# Patient Record
Sex: MALE | Race: BLACK OR AFRICAN AMERICAN | Employment: OTHER | URBAN - NONMETROPOLITAN AREA
[De-identification: names, ages, dates, MRNs, and addresses within clinical notes are randomized per-mention and may not be internally consistent; named-entity substitution may affect disease eponyms.]

---

## 2017-02-01 ENCOUNTER — HOSPITAL ENCOUNTER (EMERGENCY)
Age: 37
Discharge: HOME | End: 2017-02-01
Payer: MEDICAID

## 2017-02-01 ENCOUNTER — APPOINTMENT (OUTPATIENT)
Dept: GENERAL RADIOLOGY | Age: 37
End: 2017-02-01
Payer: MEDICAID

## 2017-02-01 VITALS
BODY MASS INDEX: 49.09 KG/M2 | DIASTOLIC BLOOD PRESSURE: 65 MMHG | RESPIRATION RATE: 18 BRPM | OXYGEN SATURATION: 97 % | TEMPERATURE: 98.2 F | WEIGHT: 260 LBS | HEIGHT: 61 IN | SYSTOLIC BLOOD PRESSURE: 104 MMHG | HEART RATE: 105 BPM

## 2017-02-01 DIAGNOSIS — M10.9 ACUTE GOUT OF LEFT WRIST, UNSPECIFIED CAUSE: Primary | ICD-10-CM

## 2017-02-01 DIAGNOSIS — M10.9 GOUT OF BIG TOE: ICD-10-CM

## 2017-02-01 PROCEDURE — 99283 EMERGENCY DEPT VISIT LOW MDM: CPT

## 2017-02-01 PROCEDURE — 73110 X-RAY EXAM OF WRIST: CPT

## 2017-02-01 PROCEDURE — 99282 EMERGENCY DEPT VISIT SF MDM: CPT | Performed by: NURSE PRACTITIONER

## 2017-02-01 RX ORDER — INDOMETHACIN 25 MG/1
CAPSULE ORAL
Qty: 60 CAPSULE | Refills: 0 | Status: SHIPPED | OUTPATIENT
Start: 2017-02-01 | End: 2020-01-06 | Stop reason: SDUPTHER

## 2017-02-23 DIAGNOSIS — E66.01 MORBID OBESITY WITH BMI OF 45.0-49.9, ADULT (HCC): ICD-10-CM

## 2017-02-23 DIAGNOSIS — Z11.4 SCREENING FOR HIV WITHOUT PRESENCE OF RISK FACTORS: ICD-10-CM

## 2017-02-23 LAB
ALBUMIN SERPL-MCNC: 3.5 G/DL (ref 3.5–5.2)
ALP BLD-CCNC: 117 U/L (ref 40–130)
ALT SERPL-CCNC: 25 U/L (ref 5–41)
ANION GAP SERPL CALCULATED.3IONS-SCNC: 14 MMOL/L (ref 7–19)
AST SERPL-CCNC: 18 U/L (ref 5–40)
BILIRUB SERPL-MCNC: <0.2 MG/DL (ref 0.2–1.2)
BUN BLDV-MCNC: 24 MG/DL (ref 6–20)
CALCIUM SERPL-MCNC: 9.2 MG/DL (ref 8.6–10)
CHLORIDE BLD-SCNC: 103 MMOL/L (ref 98–111)
CHOLESTEROL, TOTAL: 224 MG/DL (ref 160–199)
CO2: 22 MMOL/L (ref 22–29)
CREAT SERPL-MCNC: 1 MG/DL (ref 0.5–1.2)
GFR NON-AFRICAN AMERICAN: >60
GLOBULIN: 3.8 G/DL
GLUCOSE BLD-MCNC: 99 MG/DL (ref 74–109)
HCT VFR BLD CALC: 38.4 % (ref 42–52)
HDLC SERPL-MCNC: 59 MG/DL (ref 55–121)
HEMOGLOBIN: 13 G/DL (ref 14–18)
LDL CHOLESTEROL CALCULATED: 146 MG/DL
MCH RBC QN AUTO: 29.6 PG (ref 27–31)
MCHC RBC AUTO-ENTMCNC: 33.9 G/DL (ref 33–37)
MCV RBC AUTO: 87.5 FL (ref 80–94)
PDW BLD-RTO: 14.8 % (ref 11.5–14.5)
PLATELET # BLD: 369 K/UL (ref 130–400)
PMV BLD AUTO: 9.7 FL (ref 7.4–10.4)
POTASSIUM SERPL-SCNC: 4.2 MMOL/L (ref 3.5–5)
RAPID HIV 1&2: NORMAL
RBC # BLD: 4.39 M/UL (ref 4.7–6.1)
SODIUM BLD-SCNC: 139 MMOL/L (ref 136–145)
TOTAL PROTEIN: 7.3 G/DL (ref 6.6–8.7)
TRIGL SERPL-MCNC: 95 MG/DL (ref 150–199)
WBC # BLD: 4.3 K/UL (ref 4.8–10.8)

## 2017-03-08 ENCOUNTER — OFFICE VISIT (OUTPATIENT)
Dept: PRIMARY CARE CLINIC | Age: 37
End: 2017-03-08
Payer: MEDICAID

## 2017-03-08 VITALS
WEIGHT: 258 LBS | OXYGEN SATURATION: 99 % | HEIGHT: 61 IN | SYSTOLIC BLOOD PRESSURE: 110 MMHG | TEMPERATURE: 98.2 F | DIASTOLIC BLOOD PRESSURE: 68 MMHG | HEART RATE: 93 BPM | RESPIRATION RATE: 19 BRPM | BODY MASS INDEX: 48.71 KG/M2

## 2017-03-08 DIAGNOSIS — M10.09 IDIOPATHIC GOUT OF MULTIPLE SITES, UNSPECIFIED CHRONICITY: ICD-10-CM

## 2017-03-08 DIAGNOSIS — Z23 NEED FOR PROPHYLACTIC VACCINATION AGAINST DIPHTHERIA-TETANUS-PERTUSSIS (DTP): Primary | ICD-10-CM

## 2017-03-08 DIAGNOSIS — D50.8 OTHER IRON DEFICIENCY ANEMIA: ICD-10-CM

## 2017-03-08 DIAGNOSIS — E78.49 OTHER HYPERLIPIDEMIA: ICD-10-CM

## 2017-03-08 DIAGNOSIS — Z11.4 SCREENING FOR HIV WITHOUT PRESENCE OF RISK FACTORS: ICD-10-CM

## 2017-03-08 PROCEDURE — 90715 TDAP VACCINE 7 YRS/> IM: CPT | Performed by: NURSE PRACTITIONER

## 2017-03-08 PROCEDURE — 99213 OFFICE O/P EST LOW 20 MIN: CPT | Performed by: NURSE PRACTITIONER

## 2017-03-08 PROCEDURE — 90471 IMMUNIZATION ADMIN: CPT | Performed by: NURSE PRACTITIONER

## 2017-03-08 ASSESSMENT — ENCOUNTER SYMPTOMS
RESPIRATORY NEGATIVE: 1
COLOR CHANGE: 1

## 2017-06-05 DIAGNOSIS — L30.9 ECZEMA, UNSPECIFIED TYPE: ICD-10-CM

## 2017-06-05 RX ORDER — AMMONIUM LACTATE 12 G/100G
LOTION TOPICAL
Qty: 400 G | Refills: 11 | Status: SHIPPED | OUTPATIENT
Start: 2017-06-05 | End: 2018-09-14 | Stop reason: SDUPTHER

## 2017-09-08 ENCOUNTER — OFFICE VISIT (OUTPATIENT)
Dept: PRIMARY CARE CLINIC | Age: 37
End: 2017-09-08
Payer: MEDICAID

## 2017-09-08 VITALS
OXYGEN SATURATION: 96 % | DIASTOLIC BLOOD PRESSURE: 66 MMHG | HEART RATE: 88 BPM | TEMPERATURE: 97.8 F | RESPIRATION RATE: 16 BRPM | SYSTOLIC BLOOD PRESSURE: 105 MMHG | HEIGHT: 61 IN | WEIGHT: 258.4 LBS | BODY MASS INDEX: 48.79 KG/M2

## 2017-09-08 DIAGNOSIS — Q90.9 DS (DOWN'S SYNDROME): ICD-10-CM

## 2017-09-08 DIAGNOSIS — M10.09 IDIOPATHIC GOUT OF MULTIPLE SITES, UNSPECIFIED CHRONICITY: Primary | ICD-10-CM

## 2017-09-08 DIAGNOSIS — E66.01 MORBID OBESITY WITH BMI OF 45.0-49.9, ADULT (HCC): ICD-10-CM

## 2017-09-08 PROCEDURE — 99214 OFFICE O/P EST MOD 30 MIN: CPT | Performed by: NURSE PRACTITIONER

## 2017-09-08 ASSESSMENT — ENCOUNTER SYMPTOMS
SORE THROAT: 0
FACIAL SWELLING: 0
SHORTNESS OF BREATH: 0
VOICE CHANGE: 0
TROUBLE SWALLOWING: 0
WHEEZING: 0
APNEA: 0

## 2018-08-31 ENCOUNTER — TELEPHONE (OUTPATIENT)
Dept: PRIMARY CARE CLINIC | Age: 38
End: 2018-08-31

## 2018-08-31 NOTE — TELEPHONE ENCOUNTER
Pt father called and stated that pt was having a \"coughing fit\"  Stated that he would like an appointment. I advised pt that there was nothing available today but he could go to urgent care and be seen.  Father understood

## 2018-09-14 ENCOUNTER — OFFICE VISIT (OUTPATIENT)
Dept: PRIMARY CARE CLINIC | Age: 38
End: 2018-09-14
Payer: MEDICAID

## 2018-09-14 VITALS
TEMPERATURE: 97.2 F | WEIGHT: 258 LBS | SYSTOLIC BLOOD PRESSURE: 110 MMHG | BODY MASS INDEX: 50.65 KG/M2 | HEIGHT: 60 IN | OXYGEN SATURATION: 98 % | HEART RATE: 70 BPM | DIASTOLIC BLOOD PRESSURE: 70 MMHG

## 2018-09-14 DIAGNOSIS — Z00.00 WELL ADULT HEALTH CHECK: Primary | ICD-10-CM

## 2018-09-14 DIAGNOSIS — Q90.9 DOWN'S SYNDROME: ICD-10-CM

## 2018-09-14 DIAGNOSIS — L30.9 ECZEMA, UNSPECIFIED TYPE: ICD-10-CM

## 2018-09-14 DIAGNOSIS — M10.09 IDIOPATHIC GOUT OF MULTIPLE SITES, UNSPECIFIED CHRONICITY: ICD-10-CM

## 2018-09-14 PROCEDURE — G0444 DEPRESSION SCREEN ANNUAL: HCPCS | Performed by: NURSE PRACTITIONER

## 2018-09-14 PROCEDURE — 99395 PREV VISIT EST AGE 18-39: CPT | Performed by: NURSE PRACTITIONER

## 2018-09-14 RX ORDER — AMMONIUM LACTATE 12 G/100G
LOTION TOPICAL
Qty: 400 G | Refills: 11 | Status: SHIPPED | OUTPATIENT
Start: 2018-09-14 | End: 2020-01-06 | Stop reason: SDUPTHER

## 2018-09-14 ASSESSMENT — PATIENT HEALTH QUESTIONNAIRE - PHQ9
10. IF YOU CHECKED OFF ANY PROBLEMS, HOW DIFFICULT HAVE THESE PROBLEMS MADE IT FOR YOU TO DO YOUR WORK, TAKE CARE OF THINGS AT HOME, OR GET ALONG WITH OTHER PEOPLE: 0
1. LITTLE INTEREST OR PLEASURE IN DOING THINGS: 0
9. THOUGHTS THAT YOU WOULD BE BETTER OFF DEAD, OR OF HURTING YOURSELF: 0
SUM OF ALL RESPONSES TO PHQ QUESTIONS 1-9: 0
3. TROUBLE FALLING OR STAYING ASLEEP: 0
5. POOR APPETITE OR OVEREATING: 0
8. MOVING OR SPEAKING SO SLOWLY THAT OTHER PEOPLE COULD HAVE NOTICED. OR THE OPPOSITE, BEING SO FIGETY OR RESTLESS THAT YOU HAVE BEEN MOVING AROUND A LOT MORE THAN USUAL: 0
SUM OF ALL RESPONSES TO PHQ9 QUESTIONS 1 & 2: 0
4. FEELING TIRED OR HAVING LITTLE ENERGY: 0
7. TROUBLE CONCENTRATING ON THINGS, SUCH AS READING THE NEWSPAPER OR WATCHING TELEVISION: 0
2. FEELING DOWN, DEPRESSED OR HOPELESS: 0
6. FEELING BAD ABOUT YOURSELF - OR THAT YOU ARE A FAILURE OR HAVE LET YOURSELF OR YOUR FAMILY DOWN: 0
SUM OF ALL RESPONSES TO PHQ QUESTIONS 1-9: 0

## 2018-09-17 ASSESSMENT — ENCOUNTER SYMPTOMS
CONSTIPATION: 0
SHORTNESS OF BREATH: 0
CHOKING: 0
BACK PAIN: 0
DIARRHEA: 0
STRIDOR: 0
CHEST TIGHTNESS: 0
APNEA: 0
COUGH: 0
WHEEZING: 0
COLOR CHANGE: 0
ABDOMINAL DISTENTION: 0
ABDOMINAL PAIN: 0
VOMITING: 0

## 2018-09-17 NOTE — PROGRESS NOTES
about 1 year (around 9/14/2019). An electronic signature was used to authenticate this note.     --VANESA Justin on 9/17/2018 at 5:12 PM

## 2019-08-12 ENCOUNTER — HOSPITAL ENCOUNTER (EMERGENCY)
Facility: HOSPITAL | Age: 39
Discharge: HOME | End: 2019-08-13
Admitting: EMERGENCY MEDICINE

## 2019-08-12 ENCOUNTER — APPOINTMENT (OUTPATIENT)
Dept: GENERAL RADIOLOGY | Facility: HOSPITAL | Age: 39
End: 2019-08-12

## 2019-08-12 ENCOUNTER — APPOINTMENT (OUTPATIENT)
Dept: CT IMAGING | Facility: HOSPITAL | Age: 39
End: 2019-08-12

## 2019-08-12 DIAGNOSIS — M77.9 TENDONITIS: Primary | ICD-10-CM

## 2019-08-12 PROCEDURE — 73200 CT UPPER EXTREMITY W/O DYE: CPT

## 2019-08-12 PROCEDURE — 99283 EMERGENCY DEPT VISIT LOW MDM: CPT

## 2019-08-12 PROCEDURE — 73130 X-RAY EXAM OF HAND: CPT

## 2019-08-12 PROCEDURE — 73110 X-RAY EXAM OF WRIST: CPT

## 2019-08-13 VITALS
WEIGHT: 274 LBS | HEART RATE: 85 BPM | SYSTOLIC BLOOD PRESSURE: 105 MMHG | TEMPERATURE: 99 F | BODY MASS INDEX: 50.42 KG/M2 | RESPIRATION RATE: 15 BRPM | HEIGHT: 62 IN | DIASTOLIC BLOOD PRESSURE: 70 MMHG | OXYGEN SATURATION: 97 %

## 2019-08-13 RX ORDER — NAPROXEN 500 MG/1
500 TABLET ORAL 2 TIMES DAILY PRN
Qty: 10 TABLET | Refills: 0 | Status: SHIPPED | OUTPATIENT
Start: 2019-08-13 | End: 2019-08-18

## 2019-08-13 RX ORDER — METHYLPREDNISOLONE 4 MG/1
TABLET ORAL
Qty: 1 EACH | Refills: 0 | Status: SHIPPED | OUTPATIENT
Start: 2019-08-13 | End: 2021-07-09

## 2019-08-13 NOTE — ED PROVIDER NOTES
Subjective   39-year-old -American male with past medical history significant for Down syndrome presents to the emergency department with right hand pain.  He is brought in by his father.  The father met the child this evening after he was picked up from adult .  He is complaining of right dorsal wrist and hand pain.  The patient is unable to provide a history and reliable nature for any trauma.  The father states that he normally does not complain in the stairs with intermittent pain.        History provided by:  Patient  History limited by:  Patient nonverbal (History of Down syndrome.)   used: No        Review of Systems   Unable to perform ROS: Patient nonverbal       No past medical history on file.    No Known Allergies    No past surgical history on file.    No family history on file.    Social History     Socioeconomic History   • Marital status: Single     Spouse name: Not on file   • Number of children: Not on file   • Years of education: Not on file   • Highest education level: Not on file       Lab Results (last 24 hours)     ** No results found for the last 24 hours. **          Objective   Physical Exam   Constitutional: He appears well-developed and well-nourished. No distress.   HENT:   Head: Normocephalic and atraumatic.   Right Ear: External ear normal.   Left Ear: External ear normal.   Eyes: Conjunctivae and EOM are normal. Pupils are equal, round, and reactive to light. Right eye exhibits no discharge. Left eye exhibits no discharge. No scleral icterus.   Neck: Normal range of motion. Neck supple. No tracheal deviation present. No thyromegaly present.   Cardiovascular: Normal rate, regular rhythm, normal heart sounds and intact distal pulses. Exam reveals no friction rub.   No murmur heard.  Pulmonary/Chest: Effort normal and breath sounds normal. No respiratory distress. He has no wheezes.   Abdominal: Soft. Bowel sounds are normal. He exhibits no distension.  "There is no tenderness. There is no guarding.   Musculoskeletal:        Right wrist: He exhibits decreased range of motion, tenderness, bony tenderness, swelling and effusion. He exhibits no crepitus, no deformity and no laceration.        Arms:  Tenderness to palpation of the right dorsal area of the wrist.  No obvious deformities or overlying edema or erythema or warmth.  Neurovascular is currently intact distally in the radial, median, ulnar nerve distribution.  2+ radial pulses less than 2-second capillary refill.   Skin: Skin is warm and dry. Capillary refill takes less than 2 seconds. He is not diaphoretic.   Nursing note and vitals reviewed.      Procedures         XR Wrist 3+ View Right    (Results Pending)   XR Hand 3+ View Right    (Results Pending)   CT Upper Extremity Right Without Contrast    (Results Pending)       /70   Pulse 85   Temp 99 °F (37.2 °C)   Resp 15   Ht 157.5 cm (62\")   Wt 124 kg (274 lb)   SpO2 97%   BMI 50.12 kg/m²     ED Course         Medications - No data to display         MDM  Number of Diagnoses or Management Options  Tendonitis:   Diagnosis management comments: This is a 39-year-old -American male with Down syndrome with right wrist tendinitis.  Unreliable historian secondary to his Down syndrome.  Discussed case with my attending physician Dr. Sorto who recommended CT scan after x-rays were equivocal.  CT scan does show inflammation with wrist effusion.  Will have patient follow-up with the orthopedic [facility] and will apply a wrist brace.  Steroids and NSAIDs given.       Amount and/or Complexity of Data Reviewed  Tests in the radiology section of CPT®: reviewed and ordered    Risk of Complications, Morbidity, and/or Mortality  Presenting problems: low  Diagnostic procedures: low  Management options: low    Patient Progress  Patient progress: stable      Final diagnoses:   Tendonitis          Ezio Olivas PA-C  08/13/19 0215    "

## 2019-08-13 NOTE — DISCHARGE INSTRUCTIONS
The CT scan of your wrist shows nonspecific fat stranding around the tendons which correlates with possible Tendinitis and wrist joint effusion.  Please follow-up with the orthopedic [facility] for reevaluation of the symptoms.  Wear the brace as directed.  Take the steroid medication in the naproxen as directed.  Apply ice 4 times per day for 20 minutes at a time for the next 2 days.  Return to the ER should you develop any new, worsening or other concerning symptoms such as those listed below.      Contact a health care provider if:  Your symptoms are not improving or are getting worse.  You have a fever and more of any of the following symptoms:  Pain.  Redness.  Warmth.  Swelling.

## 2020-01-06 ENCOUNTER — OFFICE VISIT (OUTPATIENT)
Dept: PRIMARY CARE CLINIC | Age: 40
End: 2020-01-06
Payer: MEDICAID

## 2020-01-06 VITALS
OXYGEN SATURATION: 98 % | WEIGHT: 256 LBS | TEMPERATURE: 98 F | DIASTOLIC BLOOD PRESSURE: 68 MMHG | SYSTOLIC BLOOD PRESSURE: 122 MMHG | BODY MASS INDEX: 48.33 KG/M2 | HEART RATE: 88 BPM | HEIGHT: 61 IN

## 2020-01-06 DIAGNOSIS — M10.9 GOUT OF BIG TOE: ICD-10-CM

## 2020-01-06 DIAGNOSIS — Q90.9 DOWN'S SYNDROME: Chronic | ICD-10-CM

## 2020-01-06 LAB
ALBUMIN SERPL-MCNC: 3.6 G/DL (ref 3.5–5.2)
ALP BLD-CCNC: 97 U/L (ref 40–130)
ALT SERPL-CCNC: 16 U/L (ref 5–41)
ANION GAP SERPL CALCULATED.3IONS-SCNC: 16 MMOL/L (ref 7–19)
AST SERPL-CCNC: 18 U/L (ref 5–40)
BASOPHILS ABSOLUTE: 0.1 K/UL (ref 0–0.2)
BASOPHILS RELATIVE PERCENT: 1.2 % (ref 0–1)
BILIRUB SERPL-MCNC: 0.3 MG/DL (ref 0.2–1.2)
BUN BLDV-MCNC: 16 MG/DL (ref 6–20)
CALCIUM SERPL-MCNC: 9.1 MG/DL (ref 8.6–10)
CHLORIDE BLD-SCNC: 106 MMOL/L (ref 98–111)
CHOLESTEROL, TOTAL: 226 MG/DL (ref 160–199)
CO2: 24 MMOL/L (ref 22–29)
CREAT SERPL-MCNC: 1 MG/DL (ref 0.5–1.2)
EOSINOPHILS ABSOLUTE: 0 K/UL (ref 0–0.6)
EOSINOPHILS RELATIVE PERCENT: 0.5 % (ref 0–5)
GFR NON-AFRICAN AMERICAN: >60
GLUCOSE BLD-MCNC: 100 MG/DL (ref 74–109)
HBA1C MFR BLD: 6 % (ref 4–6)
HCT VFR BLD CALC: 41.7 % (ref 42–52)
HDLC SERPL-MCNC: 60 MG/DL (ref 55–121)
HEMOGLOBIN: 13.5 G/DL (ref 14–18)
IMMATURE GRANULOCYTES #: 0 K/UL
LDL CHOLESTEROL CALCULATED: 148 MG/DL
LYMPHOCYTES ABSOLUTE: 1.6 K/UL (ref 1.1–4.5)
LYMPHOCYTES RELATIVE PERCENT: 38.9 % (ref 20–40)
MCH RBC QN AUTO: 29.2 PG (ref 27–31)
MCHC RBC AUTO-ENTMCNC: 32.4 G/DL (ref 33–37)
MCV RBC AUTO: 90.1 FL (ref 80–94)
MONOCYTES ABSOLUTE: 0.6 K/UL (ref 0–0.9)
MONOCYTES RELATIVE PERCENT: 13.4 % (ref 0–10)
NEUTROPHILS ABSOLUTE: 1.9 K/UL (ref 1.5–7.5)
NEUTROPHILS RELATIVE PERCENT: 45.5 % (ref 50–65)
PDW BLD-RTO: 15.1 % (ref 11.5–14.5)
PLATELET # BLD: 331 K/UL (ref 130–400)
PMV BLD AUTO: 9.5 FL (ref 9.4–12.4)
POTASSIUM SERPL-SCNC: 3.6 MMOL/L (ref 3.5–5)
RBC # BLD: 4.63 M/UL (ref 4.7–6.1)
SODIUM BLD-SCNC: 146 MMOL/L (ref 136–145)
TOTAL PROTEIN: 7.5 G/DL (ref 6.6–8.7)
TRIGL SERPL-MCNC: 90 MG/DL (ref 0–149)
TSH SERPL DL<=0.05 MIU/L-ACNC: 2.26 UIU/ML (ref 0.27–4.2)
URIC ACID, SERUM: 10 MG/DL (ref 3.4–7)
WBC # BLD: 4.1 K/UL (ref 4.8–10.8)

## 2020-01-06 PROCEDURE — 99395 PREV VISIT EST AGE 18-39: CPT | Performed by: NURSE PRACTITIONER

## 2020-01-06 RX ORDER — INDOMETHACIN 25 MG/1
CAPSULE ORAL
Qty: 60 CAPSULE | Refills: 0 | Status: SHIPPED | OUTPATIENT
Start: 2020-01-06 | End: 2020-03-12

## 2020-01-06 RX ORDER — ALLOPURINOL 300 MG/1
300 TABLET ORAL DAILY
Qty: 30 TABLET | Refills: 5 | Status: SHIPPED | OUTPATIENT
Start: 2020-01-06 | End: 2021-01-12 | Stop reason: SDUPTHER

## 2020-01-06 RX ORDER — AMMONIUM LACTATE 12 G/100G
LOTION TOPICAL
Qty: 400 G | Refills: 11 | Status: SHIPPED | OUTPATIENT
Start: 2020-01-06 | End: 2021-01-27

## 2020-01-06 ASSESSMENT — ENCOUNTER SYMPTOMS
COLOR CHANGE: 0
VOICE CHANGE: 0
RHINORRHEA: 0
BACK PAIN: 0
PHOTOPHOBIA: 0
VOMITING: 0
SHORTNESS OF BREATH: 0
COUGH: 0
NAUSEA: 0

## 2020-01-06 NOTE — PROGRESS NOTES
[facility]  [address] [address] [place] [address]  Dept: [phone]  Dept Fax: [phone]  Loc: [phone]    Toña Blake is a 46 y.o. male who presents today for his medical conditions/complaints as noted below. Toña Blake is c/o of Hyperlipidemia (Follow up) and Mental Health Problem (Rita Klein Syndrome, follow up)      HPI:     HPI   Chief Complaint   Patient presents with    Hyperlipidemia     Follow up   [address], follow up     Patient presents today for follow-up hyperlipidemia, gout, eczema. Patient has history of Down's Syndrome. Currently medical conditions are well-controlled on medications. He is needing refills today. Last labs were almost 3 years ago. He will need routine blood work. His father states they are working weight loss as he enjoys eating quite a bit.      Past Medical History:   Diagnosis Date    Back stiffness     Down's syndrome     Gout     Stiffness in joint     bilateral legs      Past Surgical History:   Procedure Laterality Date    CARDIAC SURGERY      septal repair       Vitals 1/6/2020 9/14/2018 9/8/2017 3/8/2017 2/1/2017 5/0/2143   SYSTOLIC 239 015 364 328 116 472   DIASTOLIC 68 70 66 68 65 65   Site - - - Left Arm - -   Position - - - Sitting - -   Cuff Size - - - Medium Adult - -   Pulse 88 70 88 93 105 105   Temp 98 97.2 97.8 98.2 - 98.2   Resp - - 16 19 18 18   SpO2 98 98 96 99 97 97   Weight 256 lb 258 lb 258 lb 6.4 oz 258 lb - 260 lb   Height 5' 1\" 5' 0\" 5' 1\" 5' 1\" - 5' 1\"   BMI (wt*703/ht~2) 48.37 kg/m2 50.38 kg/m2 48.82 kg/m2 48.74 kg/m2 - 49.12 kg/m2       Family History   Problem Relation Age of Onset    Other Father         gout    High Blood Pressure Mother        Social History     Tobacco Use    Smoking status: Never Smoker    Smokeless tobacco: Never Used   Substance Use Topics    Alcohol use: No      Current Outpatient Medications   Medication Sig Dispense Refill    indomethacin (INDOCIN) 25 MG capsule 1-2 CAPSULES BY MOUTH 3 TIMES A DAY AS NEEDED FOR ACUTE GOUT INFLAMMATION AND PAIN. TAKE WITH FOOD. 60 capsule 0    allopurinol (ZYLOPRIM) 300 MG tablet Take 1 tablet by mouth daily 30 tablet 5    ammonium lactate (LAC-HYDRIN) 12 % lotion Apply topically daily. 400 g 11     No current facility-administered medications for this visit. No Known Allergies    Health Maintenance   Topic Date Due    Varicella Vaccine (1 of 2 - 2-dose childhood series) 07/02/1982    Flu vaccine (1) 09/01/2019    DTaP/Tdap/Td vaccine (2 - Td) 03/08/2027    HIV screen  Completed    Pneumococcal 0-64 years Vaccine  Aged Out       Subjective:      Review of Systems   Constitutional: Negative for chills and fever. HENT: Negative for ear pain, hearing loss, rhinorrhea and voice change. Eyes: Negative for photophobia and visual disturbance. Respiratory: Negative for cough and shortness of breath. Cardiovascular: Negative for chest pain and palpitations. Gastrointestinal: Negative for nausea and vomiting. Endocrine: Negative. Negative for cold intolerance and heat intolerance. Genitourinary: Negative for difficulty urinating and flank pain. Musculoskeletal: Negative for back pain and neck pain. Skin: Negative for color change and rash. Allergic/Immunologic: Negative for environmental allergies and food allergies. Neurological: Negative for dizziness, speech difficulty and headaches. Hematological: Does not bruise/bleed easily. Psychiatric/Behavioral: Negative for sleep disturbance and suicidal ideas. All other systems reviewed and are negative. Objective:     Physical Exam  Vitals signs and nursing note reviewed. Constitutional:       Appearance: He is well-developed. Comments: Down's; slanted eyes, short stature. HENT:      Head: Atraumatic.       Right Ear: External ear normal.      Left Ear: External ear normal.      Nose: Nose normal. Eyes:      Conjunctiva/sclera: Conjunctivae normal.      Pupils: Pupils are equal, round, and reactive to light. Neck:      Musculoskeletal: Normal range of motion and neck supple. Cardiovascular:      Rate and Rhythm: Normal rate and regular rhythm. Heart sounds: Normal heart sounds, S1 normal and S2 normal.   Pulmonary:      Effort: Pulmonary effort is normal.      Breath sounds: Normal breath sounds. Abdominal:      General: Bowel sounds are normal.      Palpations: Abdomen is soft. Musculoskeletal: Normal range of motion. Skin:     General: Skin is warm and dry. Neurological:      Mental Status: He is alert and oriented to person, place, and time. Psychiatric:         Mood and Affect: Affect is flat. Behavior: Behavior normal.       /68   Pulse 88   Temp 98 °F (36.7 °C)   Ht 5' 1\" (1.549 m)   Wt 256 lb (116.1 kg)   SpO2 98%   BMI 48.37 kg/m²     Assessment:       Diagnosis Orders   1. Encounter for physical examination     2. Gout of big toe  indomethacin (INDOCIN) 25 MG capsule    allopurinol (ZYLOPRIM) 300 MG tablet   3. Eczema, unspecified type  ammonium lactate (LAC-HYDRIN) 12 % lotion   4. Down's syndrome         Plan:       Patient will get fasting labs today; we will see him for physical in 1 year or sooner if needed. Patient was asked about his current diet and exercise habits, and personalized advice was provided regarding recommended lifestyle changes. Patient's comorbid health conditions associated with elevated BMI were discussed, including dyslipidemia and mood disorder, as well as the likely benefits of weight loss. Based upon patient's motivation to change his behavior, the following plan was agreed upon to work toward a weight loss goal : low carbohydrate diet and exercise for at least 30 minutes 4-5 days per week. Educational materials for  weight loss were provided. Patient will follow-up in 1 year(s) with PCP.   Provider spent 15 minutes counseling patient. Patient given educational materials -see patient instructions. Discussed use, benefit, and side effects of prescribed medications. All patient questions answered. Pt voiced understanding. Reviewed health maintenance. Instructed to continue currentmedications, diet and exercise. Patient agreed with treatment plan. Follow up as directed. MEDICATIONS:  Orders Placed This Encounter   Medications    indomethacin (INDOCIN) 25 MG capsule     Si-2 CAPSULES BY MOUTH 3 TIMES A DAY AS NEEDED FOR ACUTE GOUT INFLAMMATION AND PAIN. TAKE WITH FOOD. Dispense:  60 capsule     Refill:  0    allopurinol (ZYLOPRIM) 300 MG tablet     Sig: Take 1 tablet by mouth daily     Dispense:  30 tablet     Refill:  5    ammonium lactate (LAC-HYDRIN) 12 % lotion     Sig: Apply topically daily. Dispense:  400 g     Refill:  11         ORDERS:  No orders of the defined types were placed in this encounter. Follow-up:  Return in about 1 year (around 2021) for follow-up. PATIENT INSTRUCTIONS:  There are no Patient Instructions on file for this visit. Electronically signed by VANESA Louis on 2020 at 10:06 AM    EMR Dragon/transcription disclaimer:  Much of thisencounter note is electronic transcription/translation of spoken language to printed texts. The electronic translation of spoken language may be erroneous, or at times, nonsensical words or phrases may be inadvertentlytranscribed.   Although I have reviewed the note for such errors, some may still exist.

## 2021-01-12 ENCOUNTER — OFFICE VISIT (OUTPATIENT)
Dept: PRIMARY CARE CLINIC | Age: 41
End: 2021-01-12
Payer: MEDICAID

## 2021-01-12 VITALS
BODY MASS INDEX: 47.31 KG/M2 | SYSTOLIC BLOOD PRESSURE: 130 MMHG | OXYGEN SATURATION: 100 % | TEMPERATURE: 97.3 F | WEIGHT: 267 LBS | HEART RATE: 111 BPM | RESPIRATION RATE: 20 BRPM | HEIGHT: 63 IN | DIASTOLIC BLOOD PRESSURE: 70 MMHG

## 2021-01-12 DIAGNOSIS — M10.9 GOUT OF BIG TOE: ICD-10-CM

## 2021-01-12 DIAGNOSIS — R53.83 FATIGUE, UNSPECIFIED TYPE: ICD-10-CM

## 2021-01-12 DIAGNOSIS — E78.2 MIXED HYPERLIPIDEMIA: ICD-10-CM

## 2021-01-12 DIAGNOSIS — Z00.00 ANNUAL PHYSICAL EXAM: ICD-10-CM

## 2021-01-12 DIAGNOSIS — Z00.00 ANNUAL PHYSICAL EXAM: Primary | ICD-10-CM

## 2021-01-12 LAB
ALBUMIN SERPL-MCNC: 3.3 G/DL (ref 3.5–5.2)
ALP BLD-CCNC: 102 U/L (ref 40–130)
ALT SERPL-CCNC: 13 U/L (ref 5–41)
ANION GAP SERPL CALCULATED.3IONS-SCNC: 8 MMOL/L (ref 7–19)
ANISOCYTOSIS: ABNORMAL
AST SERPL-CCNC: 14 U/L (ref 5–40)
ATYPICAL LYMPHOCYTE RELATIVE PERCENT: 2 % (ref 0–8)
BASOPHILS ABSOLUTE: 0 K/UL (ref 0–0.2)
BASOPHILS RELATIVE PERCENT: 1 % (ref 0–1)
BILIRUB SERPL-MCNC: <0.2 MG/DL (ref 0.2–1.2)
BUN BLDV-MCNC: 19 MG/DL (ref 6–20)
CALCIUM SERPL-MCNC: 9.1 MG/DL (ref 8.6–10)
CHLORIDE BLD-SCNC: 107 MMOL/L (ref 98–111)
CHOLESTEROL, TOTAL: 205 MG/DL (ref 160–199)
CO2: 26 MMOL/L (ref 22–29)
CREAT SERPL-MCNC: 0.9 MG/DL (ref 0.5–1.2)
EOSINOPHILS ABSOLUTE: 0 K/UL (ref 0–0.6)
EOSINOPHILS RELATIVE PERCENT: 0 % (ref 0–5)
GFR AFRICAN AMERICAN: >59
GFR NON-AFRICAN AMERICAN: >60
GLUCOSE BLD-MCNC: 105 MG/DL (ref 74–109)
HBA1C MFR BLD: 6.3 % (ref 4–6)
HCT VFR BLD CALC: 38.5 % (ref 42–52)
HDLC SERPL-MCNC: 58 MG/DL (ref 55–121)
HEMOGLOBIN: 12.6 G/DL (ref 14–18)
HYPOCHROMIA: ABNORMAL
IMMATURE GRANULOCYTES #: 0 K/UL
LDL CHOLESTEROL CALCULATED: 132 MG/DL
LYMPHOCYTES ABSOLUTE: 1.3 K/UL (ref 1.1–4.5)
LYMPHOCYTES RELATIVE PERCENT: 38 % (ref 20–40)
MCH RBC QN AUTO: 28.9 PG (ref 27–31)
MCHC RBC AUTO-ENTMCNC: 32.7 G/DL (ref 33–37)
MCV RBC AUTO: 88.3 FL (ref 80–94)
METAMYELOCYTES RELATIVE PERCENT: 1 %
MONOCYTES ABSOLUTE: 0.5 K/UL (ref 0–0.9)
MONOCYTES RELATIVE PERCENT: 14 % (ref 0–10)
NEUTROPHILS ABSOLUTE: 1.5 K/UL (ref 1.5–7.5)
NEUTROPHILS RELATIVE PERCENT: 44 % (ref 50–65)
PDW BLD-RTO: 15.2 % (ref 11.5–14.5)
PLATELET # BLD: 368 K/UL (ref 130–400)
PLATELET SLIDE REVIEW: ADEQUATE
PMV BLD AUTO: 10 FL (ref 9.4–12.4)
POLYCHROMASIA: ABNORMAL
POTASSIUM SERPL-SCNC: 3.8 MMOL/L (ref 3.5–5)
RBC # BLD: 4.36 M/UL (ref 4.7–6.1)
SODIUM BLD-SCNC: 141 MMOL/L (ref 136–145)
TOTAL PROTEIN: 7.2 G/DL (ref 6.6–8.7)
TRIGL SERPL-MCNC: 77 MG/DL (ref 0–149)
TSH SERPL DL<=0.05 MIU/L-ACNC: 3.71 UIU/ML (ref 0.27–4.2)
URIC ACID, SERUM: 7.4 MG/DL (ref 3.4–7)
VITAMIN D 25-HYDROXY: 8.2 NG/ML
WBC # BLD: 3.3 K/UL (ref 4.8–10.8)

## 2021-01-12 PROCEDURE — 99214 OFFICE O/P EST MOD 30 MIN: CPT | Performed by: NURSE PRACTITIONER

## 2021-01-12 RX ORDER — INDOMETHACIN 25 MG/1
CAPSULE ORAL
Qty: 60 CAPSULE | Refills: 1 | Status: SHIPPED | OUTPATIENT
Start: 2021-01-12 | End: 2021-06-28

## 2021-01-12 RX ORDER — METHYLPREDNISOLONE 4 MG/1
TABLET ORAL
Qty: 1 KIT | Refills: 0 | Status: SHIPPED | OUTPATIENT
Start: 2021-01-12 | End: 2021-01-18

## 2021-01-12 RX ORDER — ALLOPURINOL 300 MG/1
300 TABLET ORAL DAILY
Qty: 30 TABLET | Refills: 5 | Status: SHIPPED | OUTPATIENT
Start: 2021-01-12 | End: 2021-07-01 | Stop reason: SDUPTHER

## 2021-01-12 SDOH — ECONOMIC STABILITY: FOOD INSECURITY: WITHIN THE PAST 12 MONTHS, YOU WORRIED THAT YOUR FOOD WOULD RUN OUT BEFORE YOU GOT MONEY TO BUY MORE.: NEVER TRUE

## 2021-01-12 SDOH — ECONOMIC STABILITY: INCOME INSECURITY: HOW HARD IS IT FOR YOU TO PAY FOR THE VERY BASICS LIKE FOOD, HOUSING, MEDICAL CARE, AND HEATING?: NOT HARD AT ALL

## 2021-01-12 SDOH — ECONOMIC STABILITY: TRANSPORTATION INSECURITY
IN THE PAST 12 MONTHS, HAS LACK OF TRANSPORTATION KEPT YOU FROM MEETINGS, WORK, OR FROM GETTING THINGS NEEDED FOR DAILY LIVING?: NO

## 2021-01-12 ASSESSMENT — ENCOUNTER SYMPTOMS
RHINORRHEA: 0
COUGH: 0
BACK PAIN: 0
VOICE CHANGE: 0
VOMITING: 0
COLOR CHANGE: 0
SHORTNESS OF BREATH: 0
NAUSEA: 0
PHOTOPHOBIA: 0

## 2021-01-12 ASSESSMENT — PATIENT HEALTH QUESTIONNAIRE - PHQ9
SUM OF ALL RESPONSES TO PHQ QUESTIONS 1-9: 1

## 2021-01-12 NOTE — PROGRESS NOTES
[address] [facility]  [address] [address]  Dept: [phone]  Dept Fax: [phone]  Loc: [phone]    Allyson Cabral is a 45 y.o. male who presents today for his medical conditions/complaints as noted below. Allyson Cabral is c/o of Annual Exam (pt been real sleepy more, less active) and Joint Pain (been bothering more in wrist, sitting on them more)        HPI:     HPI   Chief Complaint   Patient presents with    Annual Exam     pt been real sleepy more, less active    Joint Pain     been bothering more in wrist, sitting on them more     Patient presents today for annual physical exam. He has history of gout and down's syndrome. Lately he has been complaining of wrist pain and also has been more fatigued. He is having right wrist pain; often sits on his hands. His father reports he has been complaining for about 1 week of wrist discomfort. No known injury; hx gout in hands. Last labs were 2020; he has been fasting today and can get them. Lab Results   Component Value Date     (H) 01/06/2020    K 3.6 01/06/2020     01/06/2020    CO2 24 01/06/2020    BUN 16 01/06/2020    CREATININE 1.0 01/06/2020    GLUCOSE 100 01/06/2020    CALCIUM 9.1 01/06/2020    PROT 7.5 01/06/2020    LABALBU 3.6 01/06/2020    BILITOT 0.3 01/06/2020    ALKPHOS 97 01/06/2020    AST 18 01/06/2020    ALT 16 01/06/2020    LABGLOM >60 01/06/2020    GLOB 3.8 02/23/2017       Lab Results   Component Value Date    CHOL 226 (H) 01/06/2020    CHOL 224 (H) 02/23/2017     Lab Results   Component Value Date    TRIG 90 01/06/2020    TRIG 95 (L) 02/23/2017     Lab Results   Component Value Date    HDL 60 01/06/2020    HDL 59 02/23/2017     Lab Results   Component Value Date    LDLCALC 148 01/06/2020    LDLCALC 146 02/23/2017     No results found for: LABVLDL, VLDL  No results found for: CHOLHDLRATIO.     Lab Results   Component Value Date    LABA1C 6.0 01/06/2020 No results found for: EAG  Lab Results   Component Value Date    TSH 2.260 01/06/2020     Lab Results   Component Value Date    LABURIC 10.0 (H) 01/06/2020    URICACID 9.0 (H) 06/12/2012     Lab Results   Component Value Date    WBC 4.1 (L) 01/06/2020    HGB 13.5 (L) 01/06/2020    HCT 41.7 (L) 01/06/2020    MCV 90.1 01/06/2020     01/06/2020           Past Medical History:   Diagnosis Date    Back stiffness     Down's syndrome     Osteoarthritis     Stiffness in joint     bilateral legs      Past Surgical History:   Procedure Laterality Date    CARDIAC SURGERY      septal repair       Vitals 1/12/2021 1/6/2020 9/14/2018 9/8/2017 3/8/2017 1/8/3052   SYSTOLIC 700 616 935 103 496 789   DIASTOLIC 70 68 70 66 68 65   Site - - - - Left Arm -   Position - - - - Sitting -   Cuff Size - - - - Medium Adult -   Pulse 111 88 70 88 93 105   Temp 97.3 98 97.2 97.8 98.2 -   Resp 20 - - 16 19 18   SpO2 100 98 98 96 99 97   Weight 267 lb 256 lb 258 lb 258 lb 6.4 oz 258 lb -   Height 5' 3\" 5' 1\" 5' 0\" 5' 1\" 5' 1\" -   Body mass index 47.29 kg/m2 48.37 kg/m2 50.38 kg/m2 48.82 kg/m2 48.74 kg/m2 -   Some recent data might be hidden       Family History   Problem Relation Age of Onset    Other Father         gout    High Blood Pressure Mother        Social History     Tobacco Use    Smoking status: Never Smoker    Smokeless tobacco: Never Used   Substance Use Topics    Alcohol use: No      Current Outpatient Medications   Medication Sig Dispense Refill    allopurinol (ZYLOPRIM) 300 MG tablet Take 1 tablet by mouth daily 30 tablet 5    indomethacin (INDOCIN) 25 MG capsule Take 1-2 CAPSULES BY MOUTH 3 TIMES A DAY AS NEEDED FOR ACUTE GOUT INFLAMMATION AND PAIN. TAKE WITH FOOD. 60 capsule 1    ammonium lactate (LAC-HYDRIN) 12 % lotion Apply topically daily. (Patient not taking: Reported on 1/12/2021) 400 g 11     No current facility-administered medications for this visit.       No Known Allergies    Health Maintenance Heart sounds: Normal heart sounds, S1 normal and S2 normal.   Pulmonary:      Effort: Pulmonary effort is normal.      Breath sounds: Normal breath sounds. Abdominal:      General: Bowel sounds are normal.      Palpations: Abdomen is soft. Musculoskeletal: Normal range of motion. Skin:     General: Skin is warm and dry. Neurological:      Mental Status: He is alert and oriented to person, place, and time. Psychiatric:         Behavior: Behavior normal.       /70   Pulse 111   Temp 97.3 °F (36.3 °C) (Temporal)   Resp 20   Ht 5' 3\" (1.6 m)   Wt 267 lb (121.1 kg)   SpO2 100%   BMI 47.30 kg/m²     Assessment:       Diagnosis Orders   1. Annual physical exam  CBC Auto Differential    Comprehensive Metabolic Panel    Lipid Panel    Hemoglobin A1C    TSH without Reflex    Vitamin D 25 Hydroxy    Uric Acid   2. Gout of big toe  allopurinol (ZYLOPRIM) 300 MG tablet    indomethacin (INDOCIN) 25 MG capsule    CBC Auto Differential    Comprehensive Metabolic Panel    Lipid Panel    Hemoglobin A1C    TSH without Reflex    Vitamin D 25 Hydroxy    Uric Acid   3. Fatigue, unspecified type  CBC Auto Differential    Comprehensive Metabolic Panel    Lipid Panel    Hemoglobin A1C    TSH without Reflex    Vitamin D 25 Hydroxy    Uric Acid   4. Mixed hyperlipidemia  CBC Auto Differential    Comprehensive Metabolic Panel    Lipid Panel    Hemoglobin A1C    TSH without Reflex    Vitamin D 25 Hydroxy    Uric Acid         Plan:     More than 50% of the time was spent counseling and coordinating care for a total time of 30 min face to face. Will add medrol dose pack for wrist pain; if not improving will need to follow-up for xrays. Encouraged exercise and diet. Will check vit d level.

## 2021-06-28 DIAGNOSIS — M10.9 GOUT OF BIG TOE: ICD-10-CM

## 2021-06-28 RX ORDER — INDOMETHACIN 25 MG/1
CAPSULE ORAL
Qty: 60 CAPSULE | Refills: 1 | Status: SHIPPED | OUTPATIENT
Start: 2021-06-28

## 2021-07-01 ENCOUNTER — OFFICE VISIT (OUTPATIENT)
Dept: PRIMARY CARE CLINIC | Age: 41
End: 2021-07-01
Payer: MEDICAID

## 2021-07-01 VITALS
RESPIRATION RATE: 18 BRPM | HEIGHT: 65 IN | SYSTOLIC BLOOD PRESSURE: 124 MMHG | HEART RATE: 82 BPM | BODY MASS INDEX: 44.15 KG/M2 | WEIGHT: 265 LBS | DIASTOLIC BLOOD PRESSURE: 82 MMHG | OXYGEN SATURATION: 98 % | TEMPERATURE: 97 F

## 2021-07-01 DIAGNOSIS — Z71.85 VACCINE COUNSELING: ICD-10-CM

## 2021-07-01 DIAGNOSIS — M10.9 GOUT OF BIG TOE: ICD-10-CM

## 2021-07-01 DIAGNOSIS — Q90.9 DOWN'S SYNDROME: Primary | Chronic | ICD-10-CM

## 2021-07-01 PROCEDURE — 99214 OFFICE O/P EST MOD 30 MIN: CPT | Performed by: NURSE PRACTITIONER

## 2021-07-01 RX ORDER — ALLOPURINOL 300 MG/1
450 TABLET ORAL DAILY
Qty: 45 TABLET | Refills: 5 | Status: SHIPPED | OUTPATIENT
Start: 2021-07-01 | End: 2021-11-22

## 2021-07-01 ASSESSMENT — ENCOUNTER SYMPTOMS
BACK PAIN: 0
NAUSEA: 0
COLOR CHANGE: 0
RHINORRHEA: 0
VOICE CHANGE: 0
COUGH: 0
SHORTNESS OF BREATH: 0
PHOTOPHOBIA: 0
VOMITING: 0

## 2021-07-01 NOTE — PROGRESS NOTES
[facility]  [id number] [place] [address]  Dept: [phone]  Dept Fax: [phone]  Loc: [phone]    Lige Cogan is a 45 y.o. male who presents today for his medical conditions/complaints as noted below. Lige Cogan is c/o of Fatigue (Pt caregiver reports improvement with fatigue but pt is still having gout flare ups) and Other (has some questions about COVID vaccine - they would like to see if he should be vaccinated. )        HPI:     HPI   Chief Complaint   Patient presents with    Fatigue     Pt caregiver reports improvement with fatigue but pt is still having gout flare ups    Other     has some questions about COVID vaccine - they would like to see if he should be vaccinated. Patient presents today for follow-up gout. Patient history of down's syndrome. Per his caregiver he is having more frequent gout flair ups; specifically great toe. He is currently taking 300 mg allopurinol. No current complaints of gout flare up. Patient will be starting back to [facility]; he needs TB skin test and would like advice on covid19 vaccine.      Past Medical History:   Diagnosis Date    Back stiffness     Down's syndrome     Osteoarthritis     Stiffness in joint     bilateral legs      Past Surgical History:   Procedure Laterality Date    CARDIAC SURGERY      septal repair       Vitals 7/1/2021 1/12/2021 1/6/2020 9/14/2018 9/8/2017 9/4/2812   SYSTOLIC 436 472 412 100 420 897   DIASTOLIC 82 70 68 70 66 68   Site - - - - - Left Arm   Position - - - - - Sitting   Cuff Size - - - - - Medium Adult   Pulse 82 111 88 70 88 93   Temp 97 97.3 98 97.2 97.8 98.2   Resp 18 20 - - 16 19   SpO2 98 100 98 98 96 99   Weight 265 lb 267 lb 256 lb 258 lb 258 lb 6.4 oz 258 lb   Height 5' 5\" 5' 3\" 5' 1\" 5' 0\" 5' 1\" 5' 1\"   Body mass index 44.09 kg/m2 47.29 kg/m2 48.37 kg/m2 50.38 kg/m2 48.82 kg/m2 48.74 kg/m2   Some recent data might be hidden Neurological: Negative for dizziness, speech difficulty and headaches. Hematological: Does not bruise/bleed easily. Psychiatric/Behavioral: Negative for sleep disturbance and suicidal ideas. Objective:     Physical Exam  Vitals and nursing note reviewed. Constitutional:       Appearance: He is well-developed. Comments: Down's appearance     HENT:      Head: Atraumatic. Right Ear: External ear normal.      Left Ear: External ear normal.      Nose: Nose normal.   Eyes:      Conjunctiva/sclera: Conjunctivae normal.      Pupils: Pupils are equal, round, and reactive to light. Cardiovascular:      Rate and Rhythm: Normal rate and regular rhythm. Heart sounds: Normal heart sounds, S1 normal and S2 normal.   Pulmonary:      Effort: Pulmonary effort is normal.      Breath sounds: Normal breath sounds. Abdominal:      General: Bowel sounds are normal.      Palpations: Abdomen is soft. Musculoskeletal:         General: Normal range of motion. Cervical back: Normal range of motion and neck supple. Skin:     General: Skin is warm and dry. Neurological:      Mental Status: He is alert and oriented to person, place, and time. Psychiatric:         Behavior: Behavior normal.       /82   Pulse 82   Temp 97 °F (36.1 °C) (Temporal)   Resp 18   Ht 5' 5\" (1.651 m)   Wt 265 lb (120.2 kg)   SpO2 98%   BMI 44.10 kg/m²     Assessment:       Diagnosis Orders   1. Down's syndrome     2. Gout of big toe  allopurinol (ZYLOPRIM) 300 MG tablet   3. Vaccine counseling           Plan:   More than 50% of the time was spent counseling and coordinating care for a total time of 30 min face to face. Increase Allopurinol to 450 mg daily. Patient received approximately 5 minutes of counseling on COVID-19 pandemic in regards to hygiene, symptoms, following public guidelines, and precautions to take.   Patient received additional 5 minutes of counseling on covid vaccination data and need to vaccinate when available. Follow-up in 6 months with labs. Patient given educational materials -see patient instructions. Discussed use, benefit, and side effects of prescribed medications. All patient questions answered. Pt voiced understanding. Reviewed health maintenance. Instructed to continue currentmedications, diet and exercise. Patient agreed with treatment plan. Follow up as directed. MEDICATIONS:  Orders Placed This Encounter   Medications    allopurinol (ZYLOPRIM) 300 MG tablet     Sig: Take 1.5 tablets by mouth daily     Dispense:  45 tablet     Refill:  5         ORDERS:  No orders of the defined types were placed in this encounter. Follow-up:  Return in about 6 months (around 1/1/2022) for follow-up with labs, physical.    PATIENT INSTRUCTIONS:  There are no Patient Instructions on file for this visit. Electronically signed by VANESA Callaway on 7/1/2021 at 9:48 AM    EMR Dragon/transcription disclaimer:  Much of thisencounter note is electronic transcription/translation of spoken language to printed texts. The electronic translation of spoken language may be erroneous, or at times, nonsensical words or phrases may be inadvertentlytranscribed.   Although I have reviewed the note for such errors, some may still exist.

## 2021-07-09 PROCEDURE — 87635 SARS-COV-2 COVID-19 AMP PRB: CPT | Performed by: NURSE PRACTITIONER

## 2021-12-15 ENCOUNTER — OFFICE VISIT (OUTPATIENT)
Dept: PRIMARY CARE CLINIC | Age: 41
End: 2021-12-15
Payer: MEDICAID

## 2021-12-15 VITALS
HEIGHT: 65 IN | WEIGHT: 275.2 LBS | HEART RATE: 88 BPM | SYSTOLIC BLOOD PRESSURE: 122 MMHG | OXYGEN SATURATION: 98 % | TEMPERATURE: 97.4 F | BODY MASS INDEX: 45.85 KG/M2 | DIASTOLIC BLOOD PRESSURE: 74 MMHG

## 2021-12-15 DIAGNOSIS — Q90.9 DOWN'S SYNDROME: Primary | Chronic | ICD-10-CM

## 2021-12-15 DIAGNOSIS — M10.9 GOUT OF BIG TOE: ICD-10-CM

## 2021-12-15 PROCEDURE — 99213 OFFICE O/P EST LOW 20 MIN: CPT | Performed by: NURSE PRACTITIONER

## 2021-12-15 RX ORDER — ALLOPURINOL 300 MG/1
TABLET ORAL
Qty: 30 TABLET | Refills: 1 | Status: SHIPPED | OUTPATIENT
Start: 2021-12-15 | End: 2022-04-04

## 2021-12-15 ASSESSMENT — ENCOUNTER SYMPTOMS
COLOR CHANGE: 0
NAUSEA: 0
PHOTOPHOBIA: 0
SHORTNESS OF BREATH: 0
BACK PAIN: 0
RHINORRHEA: 0
COUGH: 0
VOICE CHANGE: 0
VOMITING: 0

## 2021-12-15 ASSESSMENT — PATIENT HEALTH QUESTIONNAIRE - PHQ9
SUM OF ALL RESPONSES TO PHQ QUESTIONS 1-9: 0
SUM OF ALL RESPONSES TO PHQ QUESTIONS 1-9: 0
2. FEELING DOWN, DEPRESSED OR HOPELESS: 0
SUM OF ALL RESPONSES TO PHQ9 QUESTIONS 1 & 2: 0
1. LITTLE INTEREST OR PLEASURE IN DOING THINGS: 0
SUM OF ALL RESPONSES TO PHQ QUESTIONS 1-9: 0

## 2021-12-15 NOTE — PROGRESS NOTES
[address] [facility]  [address] [address]  [address] [place] [address]  Dept: [phone]  Dept Fax: [phone]  Loc: [phone]    Genora Heimlich is a 37 y.o. male who presents today for his medical conditions/complaints as noted below. Genora Heimlich is c/o of Other (follow up to have papers filled out)        HPI:     HPI   Chief Complaint   Patient presents with    Other     follow up to have papers filled out     Patient presents today for follow-up gout. This has been well-controlled with allopurinol. No recent flare ups. Overall patient is doing well. His POA is present with him today and is needing paperwork filled out.          Past Medical History:   Diagnosis Date    Back stiffness     Down's syndrome     Osteoarthritis     Stiffness in joint     bilateral legs      Past Surgical History:   Procedure Laterality Date    CARDIAC SURGERY      septal repair       Vitals 12/15/2021 7/1/2021 1/12/2021 1/6/2020 9/14/2018 7/6/4352   SYSTOLIC [phone]   DIASTOLIC 74 82 70 68 70 66   Site - - - - - -   Position - - - - - -   Cuff Size - - - - - -   Pulse 88 82 111 88 70 88   Temp 97.4 97 97.3 98 97.2 97.8   Resp - 18 20 - - 16   SpO2 98 98 100 98 98 96   Weight 275 lb 3.2 oz 265 lb 267 lb 256 lb 258 lb 258 lb 6.4 oz   Height 5' 5\" 5' 5\" 5' 3\" 5' 1\" 5' 0\" 5' 1\"   Body mass index 45.79 kg/m2 44.09 kg/m2 47.29 kg/m2 48.37 kg/m2 50.38 kg/m2 48.82 kg/m2   Some recent data might be hidden       Family History   Problem Relation Age of Onset    Other Father         gout    High Blood Pressure Mother        Social History     Tobacco Use    Smoking status: Never Smoker    Smokeless tobacco: Never Used   Substance Use Topics    Alcohol use: No      Current Outpatient Medications on File Prior to Visit   Medication Sig Dispense Refill    indomethacin (INDOCIN) 25 MG capsule Take 1-2 capsules by mouth three times daily with food as needed for inflammation and pain. 60 capsule 1    ammonium lactate (LAC-HYDRIN) 12 % lotion Apply topically to affected area(s) daily. (Patient not taking: Reported on 12/15/2021) 400 g 11     No current facility-administered medications on file prior to visit. No Known Allergies    Health Maintenance   Topic Date Due    Hepatitis C screen  Never done    Varicella vaccine (1 of 2 - 2-dose childhood series) Never done    Flu vaccine (1) 09/01/2021    COVID-19 Vaccine (2 - Booster for Radha series) 09/04/2021    A1C test (Diabetic or Prediabetic)  01/12/2022    Lipid screen  01/12/2026    DTaP/Tdap/Td vaccine (2 - Td or Tdap) 03/08/2027    HIV screen  Completed    Hepatitis A vaccine  Aged Out    Hepatitis B vaccine  Aged Out    Hib vaccine  Aged Out    Meningococcal (ACWY) vaccine  Aged Out    Pneumococcal 0-64 years Vaccine  Aged Out       Subjective:      Review of Systems   Constitutional: Negative for chills and fever. HENT: Negative for ear pain, hearing loss, rhinorrhea and voice change. Eyes: Negative for photophobia and visual disturbance. Respiratory: Negative for cough and shortness of breath. Cardiovascular: Negative for chest pain and palpitations. Gastrointestinal: Negative for nausea and vomiting. Endocrine: Negative. Negative for cold intolerance and heat intolerance. Genitourinary: Negative for difficulty urinating and flank pain. Musculoskeletal: Negative for back pain and neck pain. Skin: Negative for color change and rash. Allergic/Immunologic: Negative for environmental allergies and food allergies. Neurological: Negative for dizziness, speech difficulty and headaches. Hematological: Does not bruise/bleed easily. Psychiatric/Behavioral: Negative for sleep disturbance and suicidal ideas. Objective:     Physical Exam  Vitals and nursing note reviewed. Constitutional:       Appearance: He is well-developed. HENT:      Head: Atraumatic.       Right Ear: External ear normal.      Left Ear: External ear normal.      Nose: Nose normal.   Eyes:      Conjunctiva/sclera: Conjunctivae normal.      Pupils: Pupils are equal, round, and reactive to light. Cardiovascular:      Rate and Rhythm: Normal rate and regular rhythm. Heart sounds: Normal heart sounds, S1 normal and S2 normal.   Pulmonary:      Effort: Pulmonary effort is normal.      Breath sounds: Normal breath sounds. Abdominal:      General: Bowel sounds are normal.      Palpations: Abdomen is soft. Musculoskeletal:         General: Normal range of motion. Cervical back: Normal range of motion and neck supple. Skin:     General: Skin is warm and dry. Neurological:      Mental Status: He is alert and oriented to person, place, and time. Psychiatric:         Behavior: Behavior normal.       /74   Pulse 88   Temp 97.4 °F (36.3 °C) (Temporal)   Ht 5' 5\" (1.651 m)   Wt 275 lb 3.2 oz (124.8 kg)   SpO2 98%   BMI 45.80 kg/m²     Assessment:       Diagnosis Orders   1. Down's syndrome     2. Gout of big toe  allopurinol (ZYLOPRIM) 300 MG tablet         Plan:   Continue medications as directed. Paper work to be filled out and returned to guardian. PDMP Monitoring:    Last PDMP [place] as Reviewed [facility]):  Review User Review Instant Review Result            Urine Drug Screenings (1 yr)    No resulted procedures found. Medication Contract and Consent for Opioid Use Documents Filed      No documents found                 Patient given educational materials -see patient instructions. Discussed use, benefit, and side effects of prescribed medications. All patient questions answered. Pt voiced understanding. Reviewed health maintenance. Instructed to continue currentmedications, diet and exercise. Patient agreed with treatment plan. Follow up as directed.    MEDICATIONS:  Orders Placed This Encounter   Medications    allopurinol (ZYLOPRIM) 300 MG tablet     Sig: Take 1 tablet by mouth daily Dispense:  30 tablet     Refill:  1         ORDERS:  No orders of the defined types were placed in this encounter. Follow-up:  No follow-ups on file. PATIENT INSTRUCTIONS:  There are no Patient Instructions on file for this visit. Electronically signed by VANESA Cedeno on 12/15/2021 at 2:11 PM    EMR Dragon/transcription disclaimer:  Much of thisencounter note is electronic transcription/translation of spoken language to printed texts. The electronic translation of spoken language may be erroneous, or at times, nonsensical words or phrases may be inadvertentlytranscribed.   Although I have reviewed the note for such errors, some may still exist.

## 2022-01-07 ENCOUNTER — APPOINTMENT (OUTPATIENT)
Dept: GENERAL RADIOLOGY | Facility: HOSPITAL | Age: 42
End: 2022-01-07

## 2022-01-07 ENCOUNTER — APPOINTMENT (OUTPATIENT)
Dept: CT IMAGING | Facility: HOSPITAL | Age: 42
End: 2022-01-07

## 2022-01-07 ENCOUNTER — HOSPITAL ENCOUNTER (EMERGENCY)
Facility: HOSPITAL | Age: 42
Discharge: HOME | End: 2022-01-08
Admitting: EMERGENCY MEDICINE

## 2022-01-07 DIAGNOSIS — U07.1 COVID-19: Primary | ICD-10-CM

## 2022-01-07 LAB
ALBUMIN SERPL-MCNC: 3.5 G/DL (ref 3.5–5.2)
ALBUMIN/GLOB SERPL: 0.8 G/DL
ALP SERPL-CCNC: 100 U/L (ref 39–117)
ALT SERPL W P-5'-P-CCNC: 20 U/L (ref 1–41)
ANION GAP SERPL CALCULATED.3IONS-SCNC: 9 MMOL/L (ref 5–15)
APTT PPP: 30.3 SECONDS (ref 24.1–35)
AST SERPL-CCNC: 24 U/L (ref 1–40)
BASOPHILS # BLD AUTO: 0.02 10*3/MM3 (ref 0–0.2)
BASOPHILS NFR BLD AUTO: 0.5 % (ref 0–1.5)
BILIRUB SERPL-MCNC: <0.2 MG/DL (ref 0–1.2)
BUN SERPL-MCNC: 23 MG/DL (ref 6–20)
BUN/CREAT SERPL: 16.1 (ref 7–25)
CALCIUM SPEC-SCNC: 8.6 MG/DL (ref 8.6–10.5)
CHLORIDE SERPL-SCNC: 103 MMOL/L (ref 98–107)
CO2 SERPL-SCNC: 23 MMOL/L (ref 22–29)
CREAT SERPL-MCNC: 1.43 MG/DL (ref 0.76–1.27)
CRP SERPL-MCNC: 3.59 MG/DL (ref 0–0.5)
D DIMER PPP FEU-MCNC: 0.7 MG/L (FEU) (ref 0–0.5)
D-LACTATE SERPL-SCNC: 1.7 MMOL/L (ref 0.5–2)
DEPRECATED RDW RBC AUTO: 50.4 FL (ref 37–54)
EOSINOPHIL # BLD AUTO: 0 10*3/MM3 (ref 0–0.4)
EOSINOPHIL NFR BLD AUTO: 0 % (ref 0.3–6.2)
ERYTHROCYTE [DISTWIDTH] IN BLOOD BY AUTOMATED COUNT: 15.5 % (ref 12.3–15.4)
GFR SERPL CREATININE-BSD FRML MDRD: 55 ML/MIN/1.73
GLOBULIN UR ELPH-MCNC: 4.2 GM/DL
GLUCOSE SERPL-MCNC: 134 MG/DL (ref 65–99)
HCT VFR BLD AUTO: 38.5 % (ref 37.5–51)
HGB BLD-MCNC: 12.7 G/DL (ref 13–17.7)
IMM GRANULOCYTES # BLD AUTO: 0.03 10*3/MM3 (ref 0–0.05)
IMM GRANULOCYTES NFR BLD AUTO: 0.7 % (ref 0–0.5)
INR PPP: 0.91 (ref 0.91–1.09)
LYMPHOCYTES # BLD AUTO: 0.41 10*3/MM3 (ref 0.7–3.1)
LYMPHOCYTES NFR BLD AUTO: 9.3 % (ref 19.6–45.3)
MCH RBC QN AUTO: 29.1 PG (ref 26.6–33)
MCHC RBC AUTO-ENTMCNC: 33 G/DL (ref 31.5–35.7)
MCV RBC AUTO: 88.1 FL (ref 79–97)
MONOCYTES # BLD AUTO: 0.57 10*3/MM3 (ref 0.1–0.9)
MONOCYTES NFR BLD AUTO: 13 % (ref 5–12)
NEUTROPHILS NFR BLD AUTO: 3.37 10*3/MM3 (ref 1.7–7)
NEUTROPHILS NFR BLD AUTO: 76.5 % (ref 42.7–76)
NRBC BLD AUTO-RTO: 0 /100 WBC (ref 0–0.2)
PLATELET # BLD AUTO: 269 10*3/MM3 (ref 140–450)
PMV BLD AUTO: 9.6 FL (ref 6–12)
POTASSIUM SERPL-SCNC: 4.3 MMOL/L (ref 3.5–5.2)
PROCALCITONIN SERPL-MCNC: 0.18 NG/ML (ref 0–0.25)
PROT SERPL-MCNC: 7.7 G/DL (ref 6–8.5)
PROTHROMBIN TIME: 11.9 SECONDS (ref 11.9–14.6)
RBC # BLD AUTO: 4.37 10*6/MM3 (ref 4.14–5.8)
SARS-COV-2 RNA PNL SPEC NAA+PROBE: DETECTED
SODIUM SERPL-SCNC: 135 MMOL/L (ref 136–145)
TROPONIN T SERPL-MCNC: <0.01 NG/ML (ref 0–0.03)
WBC NRBC COR # BLD: 4.4 10*3/MM3 (ref 3.4–10.8)

## 2022-01-07 PROCEDURE — 93005 ELECTROCARDIOGRAM TRACING: CPT | Performed by: EMERGENCY MEDICINE

## 2022-01-07 PROCEDURE — 85025 COMPLETE CBC W/AUTO DIFF WBC: CPT | Performed by: EMERGENCY MEDICINE

## 2022-01-07 PROCEDURE — 80053 COMPREHEN METABOLIC PANEL: CPT | Performed by: EMERGENCY MEDICINE

## 2022-01-07 PROCEDURE — 83605 ASSAY OF LACTIC ACID: CPT | Performed by: EMERGENCY MEDICINE

## 2022-01-07 PROCEDURE — 93010 ELECTROCARDIOGRAM REPORT: CPT | Performed by: INTERNAL MEDICINE

## 2022-01-07 PROCEDURE — 71275 CT ANGIOGRAPHY CHEST: CPT

## 2022-01-07 PROCEDURE — 84484 ASSAY OF TROPONIN QUANT: CPT | Performed by: EMERGENCY MEDICINE

## 2022-01-07 PROCEDURE — 0 IOPAMIDOL PER 1 ML: Performed by: NURSE PRACTITIONER

## 2022-01-07 PROCEDURE — 99283 EMERGENCY DEPT VISIT LOW MDM: CPT

## 2022-01-07 PROCEDURE — 87635 SARS-COV-2 COVID-19 AMP PRB: CPT | Performed by: EMERGENCY MEDICINE

## 2022-01-07 PROCEDURE — 87040 BLOOD CULTURE FOR BACTERIA: CPT | Performed by: EMERGENCY MEDICINE

## 2022-01-07 PROCEDURE — 71045 X-RAY EXAM CHEST 1 VIEW: CPT

## 2022-01-07 PROCEDURE — 85730 THROMBOPLASTIN TIME PARTIAL: CPT | Performed by: EMERGENCY MEDICINE

## 2022-01-07 PROCEDURE — 85610 PROTHROMBIN TIME: CPT | Performed by: EMERGENCY MEDICINE

## 2022-01-07 PROCEDURE — 86140 C-REACTIVE PROTEIN: CPT | Performed by: EMERGENCY MEDICINE

## 2022-01-07 PROCEDURE — 84145 PROCALCITONIN (PCT): CPT | Performed by: EMERGENCY MEDICINE

## 2022-01-07 PROCEDURE — 85379 FIBRIN DEGRADATION QUANT: CPT | Performed by: EMERGENCY MEDICINE

## 2022-01-07 PROCEDURE — U0004 COV-19 TEST NON-CDC HGH THRU: HCPCS | Performed by: NURSE PRACTITIONER

## 2022-01-07 RX ADMIN — SODIUM CHLORIDE 1000 ML: 9 INJECTION, SOLUTION INTRAVENOUS at 23:34

## 2022-01-07 RX ADMIN — IOPAMIDOL 79 ML: 755 INJECTION, SOLUTION INTRAVENOUS at 22:57

## 2022-01-08 VITALS
OXYGEN SATURATION: 98 % | TEMPERATURE: 98.4 F | SYSTOLIC BLOOD PRESSURE: 142 MMHG | HEART RATE: 82 BPM | WEIGHT: 264 LBS | HEIGHT: 60 IN | BODY MASS INDEX: 51.83 KG/M2 | RESPIRATION RATE: 18 BRPM | DIASTOLIC BLOOD PRESSURE: 72 MMHG

## 2022-01-08 LAB
QT INTERVAL: 370 MS
QTC INTERVAL: 429 MS

## 2022-01-08 RX ORDER — ONDANSETRON 4 MG/1
4 TABLET, ORALLY DISINTEGRATING ORAL EVERY 6 HOURS PRN
Qty: 8 TABLET | Refills: 0 | Status: SHIPPED | OUTPATIENT
Start: 2022-01-08 | End: 2022-01-10

## 2022-01-08 RX ORDER — ALBUTEROL SULFATE 90 UG/1
2 AEROSOL, METERED RESPIRATORY (INHALATION) EVERY 4 HOURS PRN
Qty: 6.7 G | Refills: 0 | Status: ON HOLD | OUTPATIENT
Start: 2022-01-08 | End: 2022-01-20

## 2022-01-08 RX ORDER — BENZONATATE 200 MG/1
200 CAPSULE ORAL 3 TIMES DAILY PRN
Qty: 9 CAPSULE | Refills: 0 | Status: SHIPPED | OUTPATIENT
Start: 2022-01-08 | End: 2022-01-11

## 2022-01-08 NOTE — DISCHARGE INSTRUCTIONS
Please quarantine for 5 days; if you feel better after 5 days, you may come out of quarantine but continue to wear mask in public for 5 days.   Please return if you pulse ox is 90% or less. Return to ER if any chest pain, shortness of breath or if any new or worsening symptoms. Please keep close follow up with your PCP via telephone, call on Monday.   
no

## 2022-01-08 NOTE — ED PROVIDER NOTES
Subjective   Patient is a 41-year-old male that presents the ER today with his father with complaint of COVID-19 symptoms.  The patient is nonverbal at baseline.  Patient has a history of Down syndrome.  The HPI is obtained from the patient's father.  Patient's father reports that for about 3 days the patient has had a cough and he feels that the patient has been short of breath.  He states the patient has also had diarrhea.  He states the patient was exposed to a cousin and to several other people at [facility] that have COVID-19.  The patient was taken to urgent care for a COVID-19 test and while there his blood pressure was in the 70s.  He was then sent to the ER for further evaluation.  Initial blood pressure in the triage was 88 systolic.  The patient is in no acute respiratory distress.  He is alert.  He presents here today for further evaluation.      History provided by:  Parent   used: No    Shortness of Breath  Severity:  Moderate  Onset quality:  Sudden  Duration:  1 day  Timing:  Constant  Progression:  Unchanged  Chronicity:  New  Context: not activity, not animal exposure, not emotional upset, not fumes, not known allergens, not occupational exposure, not pollens, not smoke exposure, not strong odors, not URI and not weather changes    Relieved by:  Nothing  Worsened by:  Nothing  Ineffective treatments:  None tried  Associated symptoms: cough    Associated symptoms: no abdominal pain, no chest pain, no claudication, no diaphoresis, no ear pain, no fever, no headaches, no hemoptysis, no neck pain, no PND, no rash, no sore throat, no sputum production, no syncope, no swollen glands, no vomiting and no wheezing    Risk factors: no recent alcohol use, no family hx of DVT, no hx of cancer, no hx of PE/DVT, no obesity, no oral contraceptive use, no prolonged immobilization, no recent surgery and no tobacco use        Review of Systems   Constitutional: Negative for diaphoresis and  fever.   HENT: Negative for ear pain and sore throat.    Respiratory: Positive for cough and shortness of breath. Negative for hemoptysis, sputum production and wheezing.    Cardiovascular: Negative for chest pain, claudication, syncope and PND.   Gastrointestinal: Negative for abdominal pain and vomiting.   Musculoskeletal: Negative for neck pain.   Skin: Negative for rash.   Neurological: Negative for headaches.   All other systems reviewed and are negative.      Past Medical History:   Diagnosis Date   • Down syndrome    • Gout        No Known Allergies    No past surgical history on file.    No family history on file.    Social History     Socioeconomic History   • Marital status: Single   Tobacco Use   • Smoking status: Never Smoker   • Smokeless tobacco: Never Used   Vaping Use   • Vaping Use: Never used   Substance and Sexual Activity   • Alcohol use: Never   • Drug use: Never   • Sexual activity: Defer           Objective   Physical Exam  Vitals and nursing note reviewed.   Constitutional:       Appearance: Normal appearance.   HENT:      Head: Normocephalic and atraumatic.      Mouth/Throat:      Pharynx: Oropharynx is clear.   Eyes:      Conjunctiva/sclera: Conjunctivae normal.   Cardiovascular:      Rate and Rhythm: Normal rate and regular rhythm.   Pulmonary:      Effort: Pulmonary effort is normal.      Breath sounds: Normal breath sounds.   Abdominal:      General: Bowel sounds are normal.      Palpations: Abdomen is soft.   Skin:     General: Skin is warm and dry.      Capillary Refill: Capillary refill takes less than 2 seconds.   Neurological:      General: No focal deficit present.      Mental Status: He is alert.   Psychiatric:         Mood and Affect: Mood normal.         Procedures           ED Course  ED Course as of 01/09/22 2324   Sat Jan 08, 2022   0033 Pt case discussed with Dr. Espinal; pt initially hypotensive upon arrival; BP resolved to normal prior to intervention. Pt VS at this time  stable; O2 sat 98% on RA; pt ambulated and O2 sat did not decrease, pt was not dyspneic. Pt will be DC home in stable cond. Pt father advised that pt should quarantine for 5 days, then may go out in public but wear mask for 5 days. Will DC home with zofran and tessalon, and given pulse oximeter. Pt father advised that if O2 sat 90% or below, return to the ER immediately, r/t to the ER if any new or worsening symptoms.  [LF]      ED Course User Index  [LF] Gita Avila, APRN                                         CT Angiogram Chest   Final Result   1. Right lower lobe consolidative process medially is most consistent   with infection.       2. No evidence of central pulmonary embolus..           This report was finalized on 01/08/2022 09:28 by Dr. Nicolette Anderson MD.      XR Chest 1 View   Final Result       No acute findings.   This report was finalized on 01/07/2022 21:12 by Dr. Tito Felton MD.        Labs Reviewed   COVID-19,[facility] IN-HOUSE,NASAL SWAB NO TRANSPORT MEDIA 2 HR TAT - Abnormal; Notable for the following components:       Result Value    COVID19 Detected (*)     All other components within normal limits    Narrative:     Fact sheet for providers: https://www.fda.gov/media/194855/download     Fact sheet for patients: https://www.fda.gov/media/341499/download    Test performed by PCR.    Consider negative results in combination with clinical observations, patient history, and epidemiological information.   COMPREHENSIVE METABOLIC PANEL - Abnormal; Notable for the following components:    Glucose 134 (*)     BUN 23 (*)     Creatinine 1.43 (*)     Sodium 135 (*)     eGFR Non  Amer 55 (*)     All other components within normal limits    Narrative:     GFR Normal >60  Chronic Kidney Disease <60  Kidney Failure <15     D-DIMER, QUANTITATIVE - Abnormal; Notable for the following components:    D-Dimer, Quantitative 0.70 (*)     All other components within normal limits    Narrative:      "Reference Range is 0-0.50 mg/L FEU. However, results <0.50 mg/L FEU tends to rule out DVT or PE. Results >0.50 mg/L FEU are not useful in predicting absence or presence of DVT or PE.     C-REACTIVE PROTEIN - Abnormal; Notable for the following components:    C-Reactive Protein 3.59 (*)     All other components within normal limits   CBC WITH AUTO DIFFERENTIAL - Abnormal; Notable for the following components:    Hemoglobin 12.7 (*)     RDW 15.5 (*)     Neutrophil % 76.5 (*)     Lymphocyte % 9.3 (*)     Monocyte % 13.0 (*)     Eosinophil % 0.0 (*)     Immature Grans % 0.7 (*)     Lymphocytes, Absolute 0.41 (*)     All other components within normal limits   BLOOD CULTURE - Normal   BLOOD CULTURE - Normal   PROTIME-INR - Normal   APTT - Normal   TROPONIN (IN-HOUSE) - Normal    Narrative:     Troponin T Reference Range:  <= 0.03 ng/mL-   Negative for AMI  >0.03 ng/mL-     Abnormal for myocardial necrosis.  Clinicians would have to utilize clinical acumen, EKG, Troponin and serial changes to determine if it is an Acute Myocardial Infarction or myocardial injury due to an underlying chronic condition.       Results may be falsely decreased if patient taking Biotin.     LACTIC ACID, PLASMA - Normal   PROCALCITONIN - Normal    Narrative:     As a Marker for Sepsis (Non-Neonates):     1. <0.5 ng/mL represents a low risk of severe sepsis and/or septic shock.  2. >2 ng/mL represents a high risk of severe sepsis and/or septic shock.    As a Marker for Lower Respiratory Tract Infections that require antibiotic therapy:  PCT on Admission     Antibiotic Therapy             6-12 Hrs later  >0.5                          Strongly Recommended            >0.25 - <0.5             Recommended  0.1 - 0.25                  Discouraged                       Remeasure/reassess PCT  <0.1                         Strongly Discouraged         Remeasure/reassess PCT      As 28 day mortality risk marker: \"Change in Procalcitonin Result\" (>80% or " <=80%) if Day 0 (or Day 1) and Day 4 values are available. Refer to http://www.[facility]-pct-calculator.com/    Change in PCT <=80 %   A decrease of PCT levels below or equal to 80% defines a positive change in PCT test result representing a higher risk for 28-day all-cause mortality of patients diagnosed with severe sepsis or septic shock.    Change in PCT >80 %   A decrease of PCT levels of more than 80% defines a negative change in PCT result representing a lower risk for 28-day all-cause mortality of patients diagnosed with severe sepsis or septic shock.               COVID PRE-OP / PRE-PROCEDURE SCREENING ORDER (NO ISOLATION)    Narrative:     The following orders were created for panel order COVID PRE-OP / PRE-PROCEDURE SCREENING ORDER (NO ISOLATION) - Swab, Nasal Cavity.  Procedure                               Abnormality         Status                     ---------                               -----------         ------                     COVID-19,[facility] IN-MATHEW...[[id number]]  Abnormal            Final result                 Please view results for these tests on the individual orders.   CBC AND DIFFERENTIAL    Narrative:     The following orders were created for panel order CBC & Differential.  Procedure                               Abnormality         Status                     ---------                               -----------         ------                     CBC Auto Differential[[id number]]        Abnormal            Final result                 Please view results for these tests on the individual orders.             MDM  Number of Diagnoses or Management Options  COVID-19: new and requires workup     Amount and/or Complexity of Data Reviewed  Clinical lab tests: ordered and reviewed  Tests in the radiology section of CPT®: ordered and reviewed  Tests in the medicine section of CPT®: ordered and reviewed  Decide to obtain previous medical records or to obtain history from someone other than the  patient: yes  Discuss the patient with other providers: yes    Patient Progress  Patient progress: stable      Final diagnoses:   COVID-19       ED Disposition  ED Disposition     ED Disposition Condition Comment    Discharge Stable           Olimpia Stevens, APRN  [address] [place] [address] [address]  [place] [address] [phone]    Call in 1 day           Medication List      New Prescriptions    albuterol sulfate  (90 Base) MCG/ACT inhaler  Commonly known as: PROVENTIL HFA;VENTOLIN HFA;PROAIR HFA  Inhale 2 puffs Every 4 (Four) Hours As Needed for Wheezing for up to 7 days.     benzonatate 200 MG capsule  Commonly known as: TESSALON  Take 1 capsule by mouth 3 (Three) Times a Day As Needed for Cough for up to 3 days.     ondansetron ODT 4 MG disintegrating tablet  Commonly known as: ZOFRAN-ODT  Place 1 tablet on the tongue Every 6 (Six) Hours As Needed for Nausea or Vomiting for up to 2 days.           Where to Get Your Medications      These medications were sent to [facility]/pharmacy #[id number] - [place] - [address] AT ACROSS FROM [facility] [address] - [phone] [facility] [phone]   [address] [place] [address]    Hours: 24-hours Phone: [phone]   · albuterol sulfate  (90 Base) MCG/ACT inhaler  · benzonatate 200 MG capsule  · ondansetron ODT 4 MG disintegrating tablet          Gita Avila, APRN  01/09/22 8901

## 2022-01-12 LAB
BACTERIA SPEC AEROBE CULT: NORMAL
BACTERIA SPEC AEROBE CULT: NORMAL

## 2022-01-16 ENCOUNTER — HOSPITAL ENCOUNTER (INPATIENT)
Facility: HOSPITAL | Age: 42
LOS: 5 days | Discharge: HOME HEALTH | End: 2022-01-21
Attending: EMERGENCY MEDICINE | Admitting: FAMILY MEDICINE

## 2022-01-16 ENCOUNTER — APPOINTMENT (OUTPATIENT)
Dept: GENERAL RADIOLOGY | Facility: HOSPITAL | Age: 42
End: 2022-01-16

## 2022-01-16 ENCOUNTER — APPOINTMENT (OUTPATIENT)
Dept: CT IMAGING | Facility: HOSPITAL | Age: 42
End: 2022-01-16

## 2022-01-16 DIAGNOSIS — E66.01 OBESITY, MORBID, BMI 50 OR HIGHER: ICD-10-CM

## 2022-01-16 DIAGNOSIS — D89.832 CYTOKINE RELEASE SYNDROME, GRADE 2: ICD-10-CM

## 2022-01-16 DIAGNOSIS — J12.82 PNEUMONIA DUE TO COVID-19 VIRUS: ICD-10-CM

## 2022-01-16 DIAGNOSIS — U07.1 PNEUMONIA DUE TO COVID-19 VIRUS: ICD-10-CM

## 2022-01-16 DIAGNOSIS — Q90.9 DOWN SYNDROME: ICD-10-CM

## 2022-01-16 DIAGNOSIS — J96.01 ACUTE RESPIRATORY FAILURE WITH HYPOXIA: Primary | ICD-10-CM

## 2022-01-16 LAB
ALBUMIN SERPL-MCNC: 2.4 G/DL (ref 3.5–5.2)
ALBUMIN/GLOB SERPL: 0.6 G/DL
ALP SERPL-CCNC: 104 U/L (ref 39–117)
ALT SERPL W P-5'-P-CCNC: 109 U/L (ref 1–41)
ANION GAP SERPL CALCULATED.3IONS-SCNC: 11 MMOL/L (ref 5–15)
ARTERIAL PATENCY WRIST A: POSITIVE
AST SERPL-CCNC: 134 U/L (ref 1–40)
ATMOSPHERIC PRESS: 745 MMHG
BASE EXCESS BLDA CALC-SCNC: 1.1 MMOL/L (ref 0–2)
BASOPHILS # BLD AUTO: 0.01 10*3/MM3 (ref 0–0.2)
BASOPHILS NFR BLD AUTO: 0.2 % (ref 0–1.5)
BDY SITE: ABNORMAL
BILIRUB SERPL-MCNC: 0.2 MG/DL (ref 0–1.2)
BODY TEMPERATURE: 37 C
BUN SERPL-MCNC: 21 MG/DL (ref 6–20)
BUN/CREAT SERPL: 12.4 (ref 7–25)
CALCIUM SPEC-SCNC: 7.7 MG/DL (ref 8.6–10.5)
CHLORIDE SERPL-SCNC: 95 MMOL/L (ref 98–107)
CO2 SERPL-SCNC: 24 MMOL/L (ref 22–29)
CREAT SERPL-MCNC: 1.7 MG/DL (ref 0.76–1.27)
CRP SERPL-MCNC: 15.83 MG/DL (ref 0–0.5)
D DIMER PPP FEU-MCNC: 1.44 MG/L (FEU) (ref 0–0.5)
D-LACTATE SERPL-SCNC: 1.5 MMOL/L (ref 0.5–2)
D-LACTATE SERPL-SCNC: 3.5 MMOL/L (ref 0.5–2)
DEPRECATED RDW RBC AUTO: 49.8 FL (ref 37–54)
EOSINOPHIL # BLD AUTO: 0 10*3/MM3 (ref 0–0.4)
EOSINOPHIL NFR BLD AUTO: 0 % (ref 0.3–6.2)
ERYTHROCYTE [DISTWIDTH] IN BLOOD BY AUTOMATED COUNT: 15.5 % (ref 12.3–15.4)
FERRITIN SERPL-MCNC: 1432 NG/ML (ref 30–400)
GAS FLOW AIRWAY: 15 LPM
GFR SERPL CREATININE-BSD FRML MDRD: 45 ML/MIN/1.73
GLOBULIN UR ELPH-MCNC: 4 GM/DL
GLUCOSE SERPL-MCNC: 156 MG/DL (ref 65–99)
HAV IGM SERPL QL IA: NORMAL
HBV CORE IGM SERPL QL IA: NORMAL
HBV SURFACE AG SERPL QL IA: NORMAL
HCO3 BLDA-SCNC: 25.1 MMOL/L (ref 20–26)
HCT VFR BLD AUTO: 35.9 % (ref 37.5–51)
HCV AB SER DONR QL: NORMAL
HGB BLD-MCNC: 11.8 G/DL (ref 13–17.7)
IMM GRANULOCYTES # BLD AUTO: 0.08 10*3/MM3 (ref 0–0.05)
IMM GRANULOCYTES NFR BLD AUTO: 1.5 % (ref 0–0.5)
INHALED O2 CONCENTRATION: 100 %
LYMPHOCYTES # BLD AUTO: 0.45 10*3/MM3 (ref 0.7–3.1)
LYMPHOCYTES NFR BLD AUTO: 8.4 % (ref 19.6–45.3)
Lab: ABNORMAL
Lab: ABNORMAL
MCH RBC QN AUTO: 28.8 PG (ref 26.6–33)
MCHC RBC AUTO-ENTMCNC: 32.9 G/DL (ref 31.5–35.7)
MCV RBC AUTO: 87.6 FL (ref 79–97)
MODALITY: ABNORMAL
MONOCYTES # BLD AUTO: 0.46 10*3/MM3 (ref 0.1–0.9)
MONOCYTES NFR BLD AUTO: 8.6 % (ref 5–12)
NEUTROPHILS NFR BLD AUTO: 4.35 10*3/MM3 (ref 1.7–7)
NEUTROPHILS NFR BLD AUTO: 81.3 % (ref 42.7–76)
NOTIFIED BY: ABNORMAL
NOTIFIED WHO: ABNORMAL
NRBC BLD AUTO-RTO: 0 /100 WBC (ref 0–0.2)
NT-PROBNP SERPL-MCNC: 272.4 PG/ML (ref 0–450)
PCO2 BLDA: 37 MM HG (ref 35–45)
PCO2 TEMP ADJ BLD: 37 MM HG (ref 35–45)
PH BLDA: 7.44 PH UNITS (ref 7.35–7.45)
PH, TEMP CORRECTED: 7.44 PH UNITS (ref 7.35–7.45)
PLATELET # BLD AUTO: 331 10*3/MM3 (ref 140–450)
PMV BLD AUTO: 9.4 FL (ref 6–12)
PO2 BLDA: 50.2 MM HG (ref 83–108)
PO2 TEMP ADJ BLD: 50.2 MM HG (ref 83–108)
POTASSIUM SERPL-SCNC: 4.8 MMOL/L (ref 3.5–5.2)
PROCALCITONIN SERPL-MCNC: 0.81 NG/ML (ref 0–0.25)
PROT SERPL-MCNC: 6.4 G/DL (ref 6–8.5)
RBC # BLD AUTO: 4.1 10*6/MM3 (ref 4.14–5.8)
SAO2 % BLDCOA: 85.3 % (ref 94–99)
SARS-COV-2 RNA PNL SPEC NAA+PROBE: DETECTED
SODIUM SERPL-SCNC: 130 MMOL/L (ref 136–145)
TROPONIN T SERPL-MCNC: <0.01 NG/ML (ref 0–0.03)
VENTILATOR MODE: ABNORMAL
WBC NRBC COR # BLD: 5.35 10*3/MM3 (ref 3.4–10.8)

## 2022-01-16 PROCEDURE — 94799 UNLISTED PULMONARY SVC/PX: CPT

## 2022-01-16 PROCEDURE — 83880 ASSAY OF NATRIURETIC PEPTIDE: CPT | Performed by: EMERGENCY MEDICINE

## 2022-01-16 PROCEDURE — 93005 ELECTROCARDIOGRAM TRACING: CPT | Performed by: EMERGENCY MEDICINE

## 2022-01-16 PROCEDURE — 71275 CT ANGIOGRAPHY CHEST: CPT

## 2022-01-16 PROCEDURE — 87635 SARS-COV-2 COVID-19 AMP PRB: CPT | Performed by: EMERGENCY MEDICINE

## 2022-01-16 PROCEDURE — 84484 ASSAY OF TROPONIN QUANT: CPT | Performed by: EMERGENCY MEDICINE

## 2022-01-16 PROCEDURE — XW033E5 INTRODUCTION OF REMDESIVIR ANTI-INFECTIVE INTO PERIPHERAL VEIN, PERCUTANEOUS APPROACH, NEW TECHNOLOGY GROUP 5: ICD-10-PCS | Performed by: FAMILY MEDICINE

## 2022-01-16 PROCEDURE — 87040 BLOOD CULTURE FOR BACTERIA: CPT | Performed by: EMERGENCY MEDICINE

## 2022-01-16 PROCEDURE — 36415 COLL VENOUS BLD VENIPUNCTURE: CPT | Performed by: FAMILY MEDICINE

## 2022-01-16 PROCEDURE — 83605 ASSAY OF LACTIC ACID: CPT | Performed by: EMERGENCY MEDICINE

## 2022-01-16 PROCEDURE — 25010000002 CEFEPIME PER 500 MG: Performed by: FAMILY MEDICINE

## 2022-01-16 PROCEDURE — 25010000002 DEXAMETHASONE PER 1 MG: Performed by: EMERGENCY MEDICINE

## 2022-01-16 PROCEDURE — 80074 ACUTE HEPATITIS PANEL: CPT | Performed by: FAMILY MEDICINE

## 2022-01-16 PROCEDURE — 25010000002 ENOXAPARIN PER 10 MG: Performed by: FAMILY MEDICINE

## 2022-01-16 PROCEDURE — 86140 C-REACTIVE PROTEIN: CPT | Performed by: EMERGENCY MEDICINE

## 2022-01-16 PROCEDURE — 93010 ELECTROCARDIOGRAM REPORT: CPT | Performed by: EMERGENCY MEDICINE

## 2022-01-16 PROCEDURE — 85379 FIBRIN DEGRADATION QUANT: CPT | Performed by: EMERGENCY MEDICINE

## 2022-01-16 PROCEDURE — 94640 AIRWAY INHALATION TREATMENT: CPT

## 2022-01-16 PROCEDURE — 36600 WITHDRAWAL OF ARTERIAL BLOOD: CPT

## 2022-01-16 PROCEDURE — 71045 X-RAY EXAM CHEST 1 VIEW: CPT

## 2022-01-16 PROCEDURE — 82803 BLOOD GASES ANY COMBINATION: CPT

## 2022-01-16 PROCEDURE — 85025 COMPLETE CBC W/AUTO DIFF WBC: CPT | Performed by: EMERGENCY MEDICINE

## 2022-01-16 PROCEDURE — 0 IOPAMIDOL PER 1 ML: Performed by: EMERGENCY MEDICINE

## 2022-01-16 PROCEDURE — 82728 ASSAY OF FERRITIN: CPT | Performed by: FAMILY MEDICINE

## 2022-01-16 PROCEDURE — 84145 PROCALCITONIN (PCT): CPT | Performed by: EMERGENCY MEDICINE

## 2022-01-16 PROCEDURE — 80053 COMPREHEN METABOLIC PANEL: CPT | Performed by: EMERGENCY MEDICINE

## 2022-01-16 PROCEDURE — 99285 EMERGENCY DEPT VISIT HI MDM: CPT

## 2022-01-16 RX ORDER — DEXAMETHASONE SODIUM PHOSPHATE 10 MG/ML
6 INJECTION INTRAMUSCULAR; INTRAVENOUS ONCE
Status: COMPLETED | OUTPATIENT
Start: 2022-01-16 | End: 2022-01-16

## 2022-01-16 RX ORDER — ACETAMINOPHEN 500 MG
1000 TABLET ORAL ONCE
Status: COMPLETED | OUTPATIENT
Start: 2022-01-16 | End: 2022-01-16

## 2022-01-16 RX ORDER — ALBUTEROL SULFATE 90 UG/1
2 AEROSOL, METERED RESPIRATORY (INHALATION) EVERY 6 HOURS PRN
Status: DISCONTINUED | OUTPATIENT
Start: 2022-01-16 | End: 2022-01-18

## 2022-01-16 RX ORDER — ALLOPURINOL 100 MG/1
100 TABLET ORAL DAILY
Status: DISCONTINUED | OUTPATIENT
Start: 2022-01-17 | End: 2022-01-21 | Stop reason: HOSPADM

## 2022-01-16 RX ORDER — ACETAMINOPHEN 325 MG/1
650 TABLET ORAL EVERY 4 HOURS PRN
Status: DISCONTINUED | OUTPATIENT
Start: 2022-01-16 | End: 2022-01-21 | Stop reason: HOSPADM

## 2022-01-16 RX ORDER — IPRATROPIUM BROMIDE AND ALBUTEROL SULFATE 2.5; .5 MG/3ML; MG/3ML
3 SOLUTION RESPIRATORY (INHALATION) EVERY 6 HOURS PRN
Status: DISCONTINUED | OUTPATIENT
Start: 2022-01-16 | End: 2022-01-18

## 2022-01-16 RX ORDER — ASCORBIC ACID 500 MG
500 TABLET ORAL DAILY
Status: DISCONTINUED | OUTPATIENT
Start: 2022-01-17 | End: 2022-01-21 | Stop reason: HOSPADM

## 2022-01-16 RX ORDER — ACETAMINOPHEN 650 MG/1
650 SUPPOSITORY RECTAL EVERY 4 HOURS PRN
Status: DISCONTINUED | OUTPATIENT
Start: 2022-01-16 | End: 2022-01-21 | Stop reason: HOSPADM

## 2022-01-16 RX ORDER — BENZONATATE 100 MG/1
100 CAPSULE ORAL 3 TIMES DAILY PRN
Status: DISCONTINUED | OUTPATIENT
Start: 2022-01-16 | End: 2022-01-21 | Stop reason: HOSPADM

## 2022-01-16 RX ORDER — DEXAMETHASONE SODIUM PHOSPHATE 4 MG/ML
6 INJECTION, SOLUTION INTRA-ARTICULAR; INTRALESIONAL; INTRAMUSCULAR; INTRAVENOUS; SOFT TISSUE DAILY
Status: DISCONTINUED | OUTPATIENT
Start: 2022-01-17 | End: 2022-01-21 | Stop reason: HOSPADM

## 2022-01-16 RX ORDER — DEXTROMETHORPHAN POLISTIREX 30 MG/5ML
60 SUSPENSION ORAL EVERY 12 HOURS PRN
Status: DISCONTINUED | OUTPATIENT
Start: 2022-01-16 | End: 2022-01-21 | Stop reason: HOSPADM

## 2022-01-16 RX ORDER — ZINC SULFATE 50(220)MG
220 CAPSULE ORAL DAILY
Status: DISCONTINUED | OUTPATIENT
Start: 2022-01-17 | End: 2022-01-21 | Stop reason: HOSPADM

## 2022-01-16 RX ORDER — MELATONIN
2000 DAILY
Status: DISCONTINUED | OUTPATIENT
Start: 2022-01-17 | End: 2022-01-21 | Stop reason: HOSPADM

## 2022-01-16 RX ORDER — SODIUM CHLORIDE 0.9 % (FLUSH) 0.9 %
10 SYRINGE (ML) INJECTION AS NEEDED
Status: DISCONTINUED | OUTPATIENT
Start: 2022-01-16 | End: 2022-01-21 | Stop reason: HOSPADM

## 2022-01-16 RX ORDER — SODIUM CHLORIDE 9 MG/ML
60 INJECTION, SOLUTION INTRAVENOUS CONTINUOUS
Status: DISCONTINUED | OUTPATIENT
Start: 2022-01-16 | End: 2022-01-18

## 2022-01-16 RX ADMIN — ALBUTEROL SULFATE 2 PUFF: 90 AEROSOL, METERED RESPIRATORY (INHALATION) at 22:50

## 2022-01-16 RX ADMIN — ENOXAPARIN SODIUM 30 MG: 30 INJECTION SUBCUTANEOUS at 21:32

## 2022-01-16 RX ADMIN — IOPAMIDOL 100 ML: 755 INJECTION, SOLUTION INTRAVENOUS at 16:23

## 2022-01-16 RX ADMIN — SODIUM CHLORIDE, POTASSIUM CHLORIDE, SODIUM LACTATE AND CALCIUM CHLORIDE 1000 ML: 600; 310; 30; 20 INJECTION, SOLUTION INTRAVENOUS at 14:55

## 2022-01-16 RX ADMIN — CEFEPIME HYDROCHLORIDE 2 G: 2 INJECTION, POWDER, FOR SOLUTION INTRAVENOUS at 22:22

## 2022-01-16 RX ADMIN — ACETAMINOPHEN 1000 MG: 500 TABLET, FILM COATED ORAL at 15:04

## 2022-01-16 RX ADMIN — DEXAMETHASONE SODIUM PHOSPHATE 6 MG: 10 INJECTION INTRAMUSCULAR; INTRAVENOUS at 15:03

## 2022-01-16 RX ADMIN — SODIUM CHLORIDE 60 ML/HR: 9 INJECTION, SOLUTION INTRAVENOUS at 21:32

## 2022-01-16 NOTE — ED PROVIDER NOTES
Emergency Medicine Provider Note    Subjective:    HISTORY OF PRESENT ILLNESS     This is a very pleasant 41 y.o. male with a past medical history of Down syndrome who presents to the emergency department today with a chief complaint of shortness of breath.  Patient recently diagnosed with COVID and came here for worsening shortness of breath.  Constant.  Severe.  No exacerbating relieving factors and no associated symptoms.          History is obtained from the patient's father.       Review of Systems: All other systems are reviewed and are negative other than noted in the HPI.    Past Medical History:  Past Medical History:   Diagnosis Date   • Down syndrome    • Gout        Allergies:  No Known Allergies    Past Surgical History:  History reviewed. No pertinent surgical history.    Family History:  Family History   Problem Relation Age of Onset   • No Known Problems Mother    • Diabetes Father    • Hypertension Father    • Hyperlipidemia Father        Social History:  Social History     Socioeconomic History   • Marital status: Single   Tobacco Use   • Smoking status: Never Smoker   • Smokeless tobacco: Never Used   Vaping Use   • Vaping Use: Never used   Substance and Sexual Activity   • Alcohol use: Never   • Drug use: Never   • Sexual activity: Defer       Home Medications:  Prior to Admission medications    Medication Sig Start Date End Date Taking? Authorizing Provider   albuterol sulfate  (90 Base) MCG/ACT inhaler Inhale 2 puffs Every 4 (Four) Hours As Needed for Wheezing for up to 7 days. 1/8/22 1/15/22  Gita Avila APRN   allopurinol (ZYLOPRIM) 300 MG tablet Take 1 tablet by mouth daily 12/15/21      ammonium lactate (LAC-HYDRIN) 12 % lotion Apply topically to affected area(s) daily. 1/27/21      indomethacin (INDOCIN) 25 MG capsule Take 1-2 CAPSULES BY MOUTH 3 TIMES A DAY AS NEEDED TAKE WITH FOOD. 3/12/20            Objective:    PHYSICAL EXAM     Vitals:   Vitals:    01/17/22 2000   BP:  108/86   Pulse: 94   Resp:    Temp: 97.3 °F (36.3 °C)   SpO2: 92%     GENERAL: Conically ill-appearing, in no acute distress.  HEENT: Moist mucous membranes, oropharynx clear without lesions, exudates, thrush.   EYES: No scleral icterus, conjunctivae clear.   NECK: No cervical lymphadenopathy, no stiffness.  CARDIAC: Normal rate, regular rhythm, no murmurs, 2+ peripheral pulses in all four extremities, normal capillary refill.   PULMONARY: Tachypnea, increased work of breathing, rhonchi bilaterally.  ABDOMINAL: Normal bowel sounds, abdomen is soft, non-tender, non-distended, no hepatomegaly or splenomegaly.   MUSCULOSKELETAL: Normal range of motion, no lower extremity edema.  NEUROLOGIC: Alert and oriented x 3, EOM grossly intact and moves all four extremities with normal strength.  SKIN: Warm and dry without rashes.   PSYCHIATRIC: Mood and affect are normal.     PROCEDURES     Procedures    LAB AND RADIOLOGY RESULTS     Lab Results (last 24 hours)     Procedure Component Value Units Date/Time    CBC & Differential [[id number]]  (Abnormal) Collected: 01/17/22 0323    Specimen: Blood Updated: 01/17/22 0421    Narrative:      The following orders were created for panel order CBC & Differential.  Procedure                               Abnormality         Status                     ---------                               -----------         ------                     CBC Auto Differential[[id number]]        Abnormal            Final result                 Please view results for these tests on the individual orders.    Comprehensive Metabolic Panel [[id number]]  (Abnormal) Collected: 01/17/22 0323    Specimen: Blood Updated: 01/17/22 0446     Glucose 156 mg/dL      BUN 25 mg/dL      Creatinine 1.70 mg/dL      Sodium 134 mmol/L      Potassium 5.3 mmol/L      Chloride 96 mmol/L      CO2 24.0 mmol/L      Calcium 7.6 mg/dL      Total Protein 6.0 g/dL      Albumin 2.60 g/dL      ALT (SGPT) 126 U/L      AST (SGOT) 147 U/L       "Alkaline Phosphatase 120 U/L      Total Bilirubin 0.3 mg/dL      eGFR Non African Amer 45 mL/min/1.73      Globulin 3.4 gm/dL      A/G Ratio 0.8 g/dL      BUN/Creatinine Ratio 14.7     Anion Gap 14.0 mmol/L     Narrative:      GFR Normal >60  Chronic Kidney Disease <60  Kidney Failure <15      C-reactive Protein [[id number]]  (Abnormal) Collected: 01/17/22 0323    Specimen: Blood Updated: 01/17/22 0446     C-Reactive Protein 16.62 mg/dL     Procalcitonin [[id number]]  (Abnormal) Collected: 01/17/22 0323    Specimen: Blood Updated: 01/17/22 0446     Procalcitonin 0.81 ng/mL     Narrative:      As a Marker for Sepsis (Non-Neonates):     1. <0.5 ng/mL represents a low risk of severe sepsis and/or septic shock.  2. >2 ng/mL represents a high risk of severe sepsis and/or septic shock.    As a Marker for Lower Respiratory Tract Infections that require antibiotic therapy:  PCT on Admission     Antibiotic Therapy             6-12 Hrs later  >0.5                          Strongly Recommended            >0.25 - <0.5             Recommended  0.1 - 0.25                  Discouraged                       Remeasure/reassess PCT  <0.1                         Strongly Discouraged         Remeasure/reassess PCT      As 28 day mortality risk marker: \"Change in Procalcitonin Result\" (>80% or <=80%) if Day 0 (or Day 1) and Day 4 values are available. Refer to [email or web address]    Change in PCT <=80 %   A decrease of PCT levels below or equal to 80% defines a positive change in PCT test result representing a higher risk for 28-day all-cause mortality of patients diagnosed with severe sepsis or septic shock.    Change in PCT >80 %   A decrease of PCT levels of more than 80% defines a negative change in PCT result representing a lower risk for 28-day all-cause mortality of patients diagnosed with severe sepsis or septic shock.                CBC Auto Differential [[id number]]  (Abnormal) Collected: 01/17/22 0323    " Specimen: Blood Updated: 01/17/22 0421     WBC 4.16 10*3/mm3      RBC 4.34 10*6/mm3      Hemoglobin 12.4 g/dL      Hematocrit 37.8 %      MCV 87.1 fL      MCH 28.6 pg      MCHC 32.8 g/dL      RDW 15.2 %      RDW-SD 48.5 fl      MPV 9.5 fL      Platelets 379 10*3/mm3      Neutrophil % 83.2 %      Lymphocyte % 11.5 %      Monocyte % 4.3 %      Eosinophil % 0.0 %      Basophil % 0.0 %      Immature Grans % 1.0 %      Neutrophils, Absolute 3.46 10*3/mm3      Lymphocytes, Absolute 0.48 10*3/mm3      Monocytes, Absolute 0.18 10*3/mm3      Eosinophils, Absolute 0.00 10*3/mm3      Basophils, Absolute 0.00 10*3/mm3      Immature Grans, Absolute 0.04 10*3/mm3      nRBC 0.5 /100 WBC     Legionella Antigen, Urine - Urine, Urine, Clean Catch [[id number]]  (Normal) Collected: 01/17/22 0336    Specimen: Urine, Clean Catch Updated: 01/17/22 0523     LEGIONELLA ANTIGEN, URINE Negative    S. Pneumo Ag Urine or CSF - Urine, Urine, Clean Catch [[id number]]  (Normal) Collected: 01/17/22 0336    Specimen: Urine, Clean Catch Updated: 01/17/22 0523     Strep Pneumo Ag Negative          Adult Transthoracic Echo Complete W/ Cont if Necessary Per Protocol    Result Date: 1/17/2022  Narrative: · Left ventricular wall thickness is consistent with mild to moderate concentric hypertrophy. · Estimated left ventricular EF = 60% Left ventricular systolic function is normal. · Left ventricular diastolic function was normal.      XR Chest 1 View    Result Date: 1/16/2022  Narrative: EXAMINATION: Chest 1 view 1/16/2022  HISTORY: Shortness of air  FINDINGS: Upright frontal projection of chest is compared to prior exam of 1/7/2022. Lungs are hypoventilated causing accentuation of the bronchovascular markings and cardiac silhouette. There is diffuse bilateral pneumonia with extensive consolidation within both lungs. No effusion or free air present.      Impression: 1. Expiratory chest. 2. Diffuse consolidation within both lungs consistent with either  diffuse pneumonia or pulmonary edema. This report was finalized on 01/16/2022 15:18 by Dr. Nawaf Hernandez MD.    XR Chest 1 View    Result Date: 1/7/2022  Narrative: EXAM: XR CHEST 1 VW-  INDICATION: Hypotension  COMPARISON: None available.  FINDINGS:  Cardiac silhouette is upper limits of normal in size. No pleural effusion or visible pneumothorax. No focal consolidation. No acute osseous finding.      Impression:  No acute findings. This report was finalized on 01/07/2022 21:12 by Dr. Tito Felton MD.    CT Angiogram Chest    Result Date: 1/16/2022  Narrative: EXAMINATION: CT ANGIOGRAM CHEST-   1/16/2022 4:13 PM CST  HISTORY: PE suspected, low/intermediate prob, positive D-dimer; J96.01-Acute respiratory failure with hypoxia  In order to have a CT radiation dose as low as reasonably achievable Automated Exposure Control was utilized for adjustment of the mA and/or KV according to patient size.  DLP in mGycm= 642.  CT angiogram chest. CT angiography protocol. CT imaging with bolus IV contrast injection. Under concurrent supervision axial, sagittal, coronal, and three-dimensional data sets were constructed.  Comparison is made with January 7, 2022.  Normal heart size. Normal thoracic aorta. Symmetric and normally opacified pulmonary arteries. No pulmonary embolism.  Patchy infiltrate throughout both lungs compatible with COVID pneumonia.  No pneumothorax or pleural effusion.  Summary: 1. No pulmonary embolism. 2. Prominent bilateral pneumonia.            This report was finalized on 01/16/2022 16:54 by Dr. Fabio Johnson MD.    CT Angiogram Chest    Result Date: 1/8/2022  Narrative: CT ANGIOGRAM CHEST- 1/7/2022 10:51 PM CST  HISTORY: shortness of breath, cp  COMPARISON: None.  Radiation dose equals  mGy-cm.  Automated exposure control dose reduction technique was implemented.   TECHNIQUE: Helical tomographic images of the chest were obtained after the administration of intravenous contrast following  angiogram protocol. Additionally, 3D reformatted images were provided.    FINDINGS:  Pulmonary arteries: There is suboptimal enhancement of the pulmonary arteries to evaluate for central and segmental pulmonary emboli. No central pulmonary embolus.. The pulmonary arteries are within normal limits.  Aorta and great vessels: The aorta is well opacified and demonstrates no dissection or aneurysm. The great vessels are normal in appearance.  Visualized neck base: The imaged portion of the base of the neck appears unremarkable.  Lungs: Motion artifact. No pleural effusion. Right lower lung zone opacities. A more consolidative process in the right lower lobe medially measuring 2.1 cm.. The trachea and bronchial tree are patent.  Heart: The heart is normal in size. There is no pericardial effusion.  Mediastinum and lymph nodes: No enlarged mediastinal, hilar, or axillary lymph nodes are present.  Skeletal and soft tissues: The osseous structures of the thorax and surrounding soft tissues demonstrate no acute process.  Upper abdomen: The imaged portion of the upper abdomen demonstrates no acute process.      Impression: 1. Right lower lobe consolidative process medially is most consistent with infection.  2. No evidence of central pulmonary embolus..   This report was finalized on 01/08/2022 09:28 by Dr. Nicolette Anderson MD.      ED course:    Medications   sodium chloride 0.9 % flush 10 mL (has no administration in time range)   allopurinol (ZYLOPRIM) tablet 100 mg (100 mg Oral Given 1/17/22 0807)   dextromethorphan polistirex ER (DELSYM) 30 MG/5ML oral suspension 60 mg (has no administration in time range)   benzonatate (TESSALON) capsule 100 mg (100 mg Oral Given 1/17/22 2013)   albuterol sulfate HFA (PROVENTIL HFA;VENTOLIN HFA;PROAIR HFA) inhaler 2 puff (2 puffs Inhalation Given 1/16/22 2250)   ipratropium-albuterol (DUO-NEB) nebulizer solution 3 mL (has no administration in time range)   acetaminophen (TYLENOL) tablet  650 mg (has no administration in time range)     Or   acetaminophen (TYLENOL) suppository 650 mg (has no administration in time range)   dexamethasone (DECADRON) tablet 6 mg (6 mg Oral Given 1/17/22 0807)     Or   dexamethasone (DECADRON) injection 6 mg ( Intravenous Not Given:  See Alt 1/17/22 0807)   ascorbic acid (VITAMIN C) tablet 500 mg (500 mg Oral Given 1/17/22 0807)   cholecalciferol (VITAMIN D3) tablet 2,000 Units (2,000 Units Oral Given 1/17/22 0807)   zinc sulfate (ZINCATE) capsule 220 mg (220 mg Oral Given 1/17/22 0807)   Pharmacy Consult - Remdesivir (has no administration in time range)   sodium chloride 0.9 % infusion (60 mL/hr Intravenous New Bag 1/17/22 2012)   cefepime (MAXIPIME) 2 g/100 mL 0.9% NS (mbp) (2 g Intravenous New Bag 1/17/22 2100)   diphenhydrAMINE (BENADRYL) 50 MG/ML injection  - ADS Override Pull (  Canceled Entry 1/17/22 0456)   ziprasidone (GEODON) injection 10 mg ( Intramuscular Canceled Entry 1/17/22 0456)   sodium zirconium cyclosilicate (LOKELMA) pack 10 g (10 g Oral Given 1/17/22 2013)   Pharmacy to Dose Tocilizumab (COVID-19) (has no administration in time range)   enoxaparin (LOVENOX) syringe 40 mg (40 mg Subcutaneous Given 1/17/22 2013)   lactated ringers bolus 1,000 mL (0 mL Intravenous Stopped 1/16/22 1652)   acetaminophen (TYLENOL) tablet 1,000 mg (1,000 mg Oral Given 1/16/22 1504)   dexamethasone (DECADRON) injection 6 mg (6 mg Intravenous Given 1/16/22 1503)   iopamidol (ISOVUE-370) 76 % injection 100 mL (100 mL Intravenous Given 1/16/22 1623)   cefepime (MAXIPIME) 2 g/100 mL 0.9% NS (mbp) (2 g Intravenous New Bag 1/16/22 2222)   remdesivir 200 mg in sodium chloride 0.9 % 290 mL IVPB (powder vial) (200 mg Intravenous New Bag 1/17/22 0001)   ALPRAZolam (XANAX) tablet 0.25 mg (0.25 mg Oral Given 1/17/22 6375)   tocilizumab (ACTEMRA) 800 mg in sodium chloride 0.9 % 100 mL IVPB (800 mg Intravenous New Bag 1/17/22 1101)   perflutren (DEFINITY) lipid microspheres injection  9.78 mg (9.78 mg Intravenous Given 1/17/22 0948)          Amount and/or complexity of data reviewed:    • Clinical lab tests ordered and reviewed.  • Tests in the radiology section ordered and reviewed.  • Independent visualization of imaging, tracing, or specimen is remarkable for EKG with no STEMI criteria.  • Discuss the patient with another provider: Hospitalist      Risk of significant complications, morbidity, and/or mortality.    •  Presenting problem: high  •  Diagnostic procedures: high  •  Management options: high    CRITICAL CARE ATTESTATION:  I provided 38 minutes of non-continuous critical care time, excluding procedures, evaluating this patient's critical illness and devoting time to direct management, repeat assessments, review of the labs, discussing their care with consultants, and documenting in the record.     RESPIRATORY: This patient was at risk for worsening of the respiratory system and potential respiratory collapse if no intervention was done.  I personally provided repeat exams before and after interventions and monitored the vital signs and oxygen saturations.        MEDICAL DECISION MAKING     Patient presents with redness of breath. Upon arrival to the Emergency Department patient is unwell appearing, he is tachycardic, tachypneic, hypotensive, and febrile.  IV access is obtained.  He is given a bolus of fluids as well as Tylenol with resolution of his vital sign abnormalities with the exception of his oxygen saturations.  Initially we tried Salter however this did not work so he is now on max Vapotherm.  CT angiogram with COVID-pneumonia but no pulmonary embolism, no pneumothorax, no aortic dissection, no pericardial effusion.  I discussed the case with the hospitalist and the patient has been admitted to the ICU.        Diagnosis:    Final diagnoses:   Acute respiratory failure with hypoxia (HCC)         ED Disposition:     ED Disposition     ED Disposition Condition Comment     Decision to Admit  Level of Care: Critical Care [6]   Diagnosis: Acute respiratory failure with hypoxia (HCC) [024403]   Admitting Physician: ADELFO GASCA [[id number]]   Attending Physician: ADELFO GASCA [[id number]]   Certification: I Certify That Inpatient Hospital Services Are Medically Necessary For Greater Than 2 Midnights            No follow-up provider specified.       Medication List      ASK your doctor about these medications    albuterol sulfate  (90 Base) MCG/ACT inhaler  Commonly known as: PROVENTIL HFA;VENTOLIN HFA;PROAIR HFA  Inhale 2 puffs Every 4 (Four) Hours As Needed for Wheezing for up to 7 days.  Ask about: Should I take this medication?                 Fercho Collado MD  01/17/22 7108

## 2022-01-16 NOTE — H&P
[facility]  HISTORY AND PHYSICAL    Date of Admission: 1/16/2022  Primary Care Physician: Olimpia Stevens APRN    Subjective     Chief Complaint: COVID-pneumonia.  Hypoxia.  Down syndrome.    History of Present Illness  Patient is a 41-year-old black male presented ER with complaint of shortness of breath.  Patient has history of Down syndrome.  Patient symptoms started on January 8, patient was brought to ER by his dad.  Patient was evaluated sent home.  Patient did not get any bam because ER was out of it at that time.  Main problem was mainly increasing shortness of breath.  Dad also said he had fever night sweats chills symptoms off and on.  Patient loss in taste but for couple days.  Patient does also have symptoms of vomiting and diarrhea with it.  Patient has been vaccinated with [employer] in February.  No booster at this time.  Patient has not been fasting for the flu.    In ER patient was hypotensive, low sodium, renal insufficiency.  Elevated liver enzyme.  CT scan showed bilateral pneumonia, COVID positive.  Patient does not talkDad.    Review of Systems   Unable obtain due to patient inability to talk.  Most information is from the dad.  Patient is currently requiring Vapotherm to breathe, patient is maxed out on Vapotherm.    Otherwise complete ROS reviewed and negative except as mentioned in the HPI.      Past Medical History:   Past Medical History:   Diagnosis Date   • Down syndrome    • Gout        Past Surgical History:History reviewed. No pertinent surgical history.    Family History: family history includes Diabetes in his father; Hyperlipidemia in his father; Hypertension in his father; No Known Problems in his mother.    Social History:  reports that he has never smoked. He has never used smokeless tobacco. He reports that he does not drink alcohol and does not use drugs.    Medications:  Prior to Admission medications   "  Medication Sig Start Date End Date Taking? Authorizing Provider   albuterol sulfate  (90 Base) MCG/ACT inhaler Inhale 2 puffs Every 4 (Four) Hours As Needed for Wheezing for up to 7 days. 1/8/22 1/15/22  Gita Avila APRN   allopurinol (ZYLOPRIM) 300 MG tablet Take 1 tablet by mouth daily 12/15/21      ammonium lactate (LAC-HYDRIN) 12 % lotion Apply topically to affected area(s) daily. 1/27/21      indomethacin (INDOCIN) 25 MG capsule Take 1-2 CAPSULES BY MOUTH 3 TIMES A DAY AS NEEDED TAKE WITH FOOD. 3/12/20        Allergies:  No Known Allergies    Objective     Vital Signs: /55   Pulse 84   Temp 100 °F (37.8 °C) (Oral)   Resp 22   Ht 152.4 cm (60\")   Wt 122 kg (268 lb)   SpO2 92%   BMI 52.34 kg/m²   Physical Exam  Vitals and nursing note reviewed.   Constitutional:       General: He is not in acute distress.     Appearance: He is well-developed. He is not toxic-appearing.      Comments: Down syndrome features.  Patient does not talk.   HENT:      Head: Normocephalic.      Mouth/Throat:      Mouth: Mucous membranes are moist.      Pharynx: Oropharynx is clear.   Eyes:      General: No scleral icterus.     Extraocular Movements: Extraocular movements intact.      Conjunctiva/sclera: Conjunctivae normal.      Pupils: Pupils are equal, round, and reactive to light.   Neck:      Thyroid: No thyromegaly.      Vascular: No carotid bruit or JVD.      Trachea: No tracheal deviation.   Cardiovascular:      Rate and Rhythm: Normal rate and regular rhythm.      Pulses: Normal pulses.      Heart sounds: No murmur heard.  No friction rub. No gallop.    Pulmonary:      Effort: No respiratory distress.      Breath sounds: No wheezing, rhonchi or rales.      Comments: Vapotherm.  Diminished breath sound bilateral, clear.  Chest:      Chest wall: No tenderness.   Abdominal:      General: Bowel sounds are normal. There is no distension.      Palpations: Abdomen is soft.      Tenderness: There is no " "abdominal tenderness.      Comments: Morbid obesity.  BMI is 52.   Musculoskeletal:         General: No swelling or tenderness. Normal range of motion.      Cervical back: Normal range of motion and neck supple. No muscular tenderness.   Skin:     General: Skin is warm and dry.      Capillary Refill: Capillary refill takes 2 to 3 seconds.      Findings: No erythema or rash.   Neurological:      Mental Status: He is alert.      Cranial Nerves: No cranial nerve deficit.      Motor: Weakness present.      Coordination: Coordination abnormal.      Gait: Gait abnormal.       Results Reviewed:    Lab Results (last 24 hours)     Procedure Component Value Units Date/Time    Procalcitonin [[id number]]  (Abnormal) Collected: 01/16/22 1522    Specimen: Blood Updated: 01/16/22 1602     Procalcitonin 0.81 ng/mL     Narrative:      As a Marker for Sepsis (Non-Neonates):     1. <0.5 ng/mL represents a low risk of severe sepsis and/or septic shock.  2. >2 ng/mL represents a high risk of severe sepsis and/or septic shock.    As a Marker for Lower Respiratory Tract Infections that require antibiotic therapy:  PCT on Admission     Antibiotic Therapy             6-12 Hrs later  >0.5                          Strongly Recommended            >0.25 - <0.5             Recommended  0.1 - 0.25                  Discouraged                       Remeasure/reassess PCT  <0.1                         Strongly Discouraged         Remeasure/reassess PCT      As 28 day mortality risk marker: \"Change in Procalcitonin Result\" (>80% or <=80%) if Day 0 (or Day 1) and Day 4 values are available. Refer to http://www.[employer]-pct-calculator.com/    Change in PCT <=80 %   A decrease of PCT levels below or equal to 80% defines a positive change in PCT test result representing a higher risk for 28-day all-cause mortality of patients diagnosed with severe sepsis or septic shock.    Change in PCT >80 %   A decrease of PCT levels of more than 80% defines a negative " change in PCT result representing a lower risk for 28-day all-cause mortality of patients diagnosed with severe sepsis or septic shock.                Lactic Acid, Plasma [[id number]]  (Abnormal) Collected: 01/16/22 1522    Specimen: Blood Updated: 01/16/22 1559     Lactate 3.5 mmol/L     C-reactive Protein [[id number]]  (Abnormal) Collected: 01/16/22 1522    Specimen: Blood Updated: 01/16/22 1557     C-Reactive Protein 15.83 mg/dL     Comprehensive Metabolic Panel [[id number]]  (Abnormal) Collected: 01/16/22 1522    Specimen: Blood Updated: 01/16/22 1555     Glucose 156 mg/dL      BUN 21 mg/dL      Creatinine 1.70 mg/dL      Sodium 130 mmol/L      Potassium 4.8 mmol/L      Chloride 95 mmol/L      CO2 24.0 mmol/L      Calcium 7.7 mg/dL      Total Protein 6.4 g/dL      Albumin 2.40 g/dL      ALT (SGPT) 109 U/L      AST (SGOT) 134 U/L      Alkaline Phosphatase 104 U/L      Total Bilirubin 0.2 mg/dL      eGFR Non African Amer 45 mL/min/1.73      Globulin 4.0 gm/dL      A/G Ratio 0.6 g/dL      BUN/Creatinine Ratio 12.4     Anion Gap 11.0 mmol/L     Narrative:      GFR Normal >60  Chronic Kidney Disease <60  Kidney Failure <15      Troponin [[id number]]  (Normal) Collected: 01/16/22 1522    Specimen: Blood Updated: 01/16/22 1552     Troponin T <0.010 ng/mL     Narrative:      Troponin T Reference Range:  <= 0.03 ng/mL-   Negative for AMI  >0.03 ng/mL-     Abnormal for myocardial necrosis.  Clinicians would have to utilize clinical acumen, EKG, Troponin and serial changes to determine if it is an Acute Myocardial Infarction or myocardial injury due to an underlying chronic condition.       Results may be falsely decreased if patient taking Biotin.      BNP [[id number]]  (Normal) Collected: 01/16/22 1522    Specimen: Blood Updated: 01/16/22 1551     proBNP 272.4 pg/mL     Narrative:      Among patients with dyspnea, NT-proBNP is highly sensitive for the detection of acute congestive heart failure. In addition NT-proBNP of <300  pg/ml effectively rules out acute congestive heart failure with 99% negative predictive value.    Results may be falsely decreased if patient taking Biotin.      Blood Culture - Blood, Arm, Left [[id number]] Collected: 01/16/22 1522    Specimen: Blood from Arm, Left Updated: 01/16/22 1550    Blood Culture - Blood, Arm, Left [[id number]] Collected: 01/16/22 1523    Specimen: Blood from Arm, Left Updated: 01/16/22 1549    D-dimer, Quantitative [[id number]]  (Abnormal) Collected: 01/16/22 1522    Specimen: Blood Updated: 01/16/22 1548     D-Dimer, Quantitative 1.44 mg/L (FEU)     Narrative:      Reference Range is 0-0.50 mg/L FEU. However, results <0.50 mg/L FEU tends to rule out DVT or PE. Results >0.50 mg/L FEU are not useful in predicting absence or presence of DVT or PE.      CBC & Differential [[id number]]  (Abnormal) Collected: 01/16/22 1522    Specimen: Blood Updated: 01/16/22 1535    Narrative:      The following orders were created for panel order CBC & Differential.  Procedure                               Abnormality         Status                     ---------                               -----------         ------                     CBC Auto Differential[[id number]]        Abnormal            Final result                 Please view results for these tests on the individual orders.    CBC Auto Differential [[id number]]  (Abnormal) Collected: 01/16/22 1522    Specimen: Blood Updated: 01/16/22 1535     WBC 5.35 10*3/mm3      RBC 4.10 10*6/mm3      Hemoglobin 11.8 g/dL      Hematocrit 35.9 %      MCV 87.6 fL      MCH 28.8 pg      MCHC 32.9 g/dL      RDW 15.5 %      RDW-SD 49.8 fl      MPV 9.4 fL      Platelets 331 10*3/mm3      Neutrophil % 81.3 %      Lymphocyte % 8.4 %      Monocyte % 8.6 %      Eosinophil % 0.0 %      Basophil % 0.2 %      Immature Grans % 1.5 %      Neutrophils, Absolute 4.35 10*3/mm3      Lymphocytes, Absolute 0.45 10*3/mm3      Monocytes, Absolute 0.46 10*3/mm3      Eosinophils, Absolute  0.00 10*3/mm3      Basophils, Absolute 0.01 10*3/mm3      Immature Grans, Absolute 0.08 10*3/mm3      nRBC 0.0 /100 WBC     COVID-19,[facility] IN-HOUSE,Nasal Swab No Transport Media 3-4 HR TAT - Swab, Nasal Cavity [[id number]]  (Abnormal) Collected: 01/16/22 1420    Specimen: Swab from Nasal Cavity Updated: 01/16/22 1525     COVID19 Detected    Narrative:      Fact sheet for providers: https://www.fda.gov/media/553694/download     Fact sheet for patients: https://www.fda.gov/media/028002/download    Test performed by PCR.    Consider negative results in combination with clinical observations, patient history, and epidemiological information.    Blood Gas, Arterial - [[id number]]  (Abnormal) Collected: 01/16/22 1416    Specimen: Arterial Blood Updated: 01/16/22 1422     Site Right Brachial     Varun's Test Positive     pH, Arterial 7.440 pH units      pCO2, Arterial 37.0 mm Hg      pO2, Arterial 50.2 mm Hg      Comment: 85 Value below critical limit        HCO3, Arterial 25.1 mmol/L      Base Excess, Arterial 1.1 mmol/L      O2 Saturation, Arterial 85.3 %      Comment: 84 Value below reference range        Temperature 37.0 C      Barometric Pressure for Blood Gas 745 mmHg      Modality NRB     FIO2 100 %      Flow Rate 15.0 lpm      Ventilator Mode NA     Notified [facility] LUIS FELIPE VELAZQUEZ     Notified By [id number]     Notified Time 01/16/2022 14:32     Collected by [id number]     Comment: Meter: [id number]     :  [id number]        pCO2, Temperature Corrected 37.0 mm Hg      pH, Temp Corrected 7.440 pH Units      pO2, Temperature Corrected 50.2 mm Hg            Radiology Data:    Imaging Results (Last 24 Hours)     Procedure Component Value Units Date/Time    CT Angiogram Chest [[id number]] Collected: 01/16/22 1652     Updated: 01/16/22 1657    Narrative:      EXAMINATION: CT ANGIOGRAM CHEST-      1/16/2022 4:13 PM CST     HISTORY: PE suspected, low/intermediate prob, positive D-dimer;  J96.01-Acute respiratory failure with  hypoxia     In order to have a CT radiation dose as low as reasonably achievable  Automated Exposure Control was utilized for adjustment of the mA and/or  KV according to patient size.     DLP in mGycm= 642.     CT angiogram chest.  CT angiography protocol.   CT imaging with bolus IV contrast injection.   Under concurrent supervision axial, sagittal, coronal, and  three-dimensional data sets were constructed.     Comparison is made with January 7, 2022.     Normal heart size.  Normal thoracic aorta.  Symmetric and normally opacified pulmonary arteries.  No pulmonary embolism.     Patchy infiltrate throughout both lungs compatible with COVID pneumonia.     No pneumothorax or pleural effusion.     Summary:  1. No pulmonary embolism.  2. Prominent bilateral pneumonia.                                   This report was finalized on 01/16/2022 16:54 by Dr. Fabio Johnson MD.    XR Chest 1 View [[id number]] Collected: 01/16/22 1516     Updated: 01/16/22 1521    Narrative:      EXAMINATION: Chest 1 view 1/16/2022     HISTORY: Shortness of air     FINDINGS: Upright frontal projection of chest is compared to prior exam  of 1/7/2022. Lungs are hypoventilated causing accentuation of the  bronchovascular markings and cardiac silhouette. There is diffuse  bilateral pneumonia with extensive consolidation within both lungs. No  effusion or free air present.       Impression:      1. Expiratory chest.  2. Diffuse consolidation within both lungs consistent with either  diffuse pneumonia or pulmonary edema.  This report was finalized on 01/16/2022 15:18 by Dr. Nawaf Hernandez MD.          I have personally reviewed and interpreted the radiology studies and ECG obtained at time of admission.     Assessment / Plan      Assessment & Plan  Active Hospital Problems    Diagnosis    • Acute respiratory failure with hypoxia (HCC)    • Pneumonia due to COVID-19 virus    • Down syndrome    • Obesity, morbid, BMI 50 or higher (HCC)       Plans  Acute respiratory failure/hypoxia/COVID-pneumonia.  Symptoms of COVID started January 8.  Consult pulmonary for Tocilizumab.  Incentive spirometer . flutter valve.  Vitamin C.  Zinc.  Vitamin D . consult.  Infectious disease.  Cefepime due to elevated procalcitonin.  Consult . Decadron.  Consult pharmacy for remdesivir.   Chest x-ray-Expiratory chest, Diffuse consolidation within both lungs consistent with either  diffuse pneumonia or pulmonary edema.  CTA of the chest-No pulmonary embolism, prominent bilateral pneumonia.  On Vapotherm 40/100.    Hypotension.  Slow IV hydration.    History of Down syndrome.  Echocardiogram.    Gout.  Continue allopurinol.    Hyponatremia.  Electrolyte replacement.  Normal saline.    Renal insufficiency.  Slow IV hydration    Elevated liver enzyme.  Acute hepatitis pending.    Lovenox prophylaxis renal protocol     Blood cultures pending.  COVID-19- positive.    Code Status: Full code.  If patient admit medical decision for himself, his dad Kevin would make it for him     I discussed the patient's findings and my recommendations with: Patient .    Estimated length of stay: 3 to 6 days    Electronically signed by Billy Koch MD, 01/16/22, 5:35 PM CST.

## 2022-01-17 ENCOUNTER — APPOINTMENT (OUTPATIENT)
Dept: CARDIOLOGY | Facility: HOSPITAL | Age: 42
End: 2022-01-17

## 2022-01-17 LAB
ALBUMIN SERPL-MCNC: 2.6 G/DL (ref 3.5–5.2)
ALBUMIN/GLOB SERPL: 0.8 G/DL
ALP SERPL-CCNC: 120 U/L (ref 39–117)
ALT SERPL W P-5'-P-CCNC: 126 U/L (ref 1–41)
ANION GAP SERPL CALCULATED.3IONS-SCNC: 14 MMOL/L (ref 5–15)
AST SERPL-CCNC: 147 U/L (ref 1–40)
BASOPHILS # BLD AUTO: 0 10*3/MM3 (ref 0–0.2)
BASOPHILS NFR BLD AUTO: 0 % (ref 0–1.5)
BH CV ECHO MEAS - AO MAX PG (FULL): 1.9 MMHG
BH CV ECHO MEAS - AO MAX PG: 6.4 MMHG
BH CV ECHO MEAS - AO MEAN PG (FULL): 2 MMHG
BH CV ECHO MEAS - AO MEAN PG: 4 MMHG
BH CV ECHO MEAS - AO ROOT AREA (BSA CORRECTED): 1.4
BH CV ECHO MEAS - AO ROOT AREA: 7.1 CM^2
BH CV ECHO MEAS - AO ROOT DIAM: 3 CM
BH CV ECHO MEAS - AO V2 MAX: 126 CM/SEC
BH CV ECHO MEAS - AO V2 MEAN: 89.8 CM/SEC
BH CV ECHO MEAS - AO V2 VTI: 22.4 CM
BH CV ECHO MEAS - AVA(I,A): 3.2 CM^2
BH CV ECHO MEAS - AVA(I,D): 3.2 CM^2
BH CV ECHO MEAS - AVA(V,A): 3.5 CM^2
BH CV ECHO MEAS - AVA(V,D): 3.5 CM^2
BH CV ECHO MEAS - BSA(HAYCOCK): 2.4 M^2
BH CV ECHO MEAS - BSA: 2.1 M^2
BH CV ECHO MEAS - BZI_BMI: 52.3 KILOGRAMS/M^2
BH CV ECHO MEAS - BZI_METRIC_HEIGHT: 152.4 CM
BH CV ECHO MEAS - BZI_METRIC_WEIGHT: 121.6 KG
BH CV ECHO MEAS - EDV(CUBED): 82.3 ML
BH CV ECHO MEAS - EDV(MOD-SP4): 82 ML
BH CV ECHO MEAS - EDV(TEICH): 85.4 ML
BH CV ECHO MEAS - EF(CUBED): 75.3 %
BH CV ECHO MEAS - EF(MOD-SP4): 58.8 %
BH CV ECHO MEAS - EF(TEICH): 67.5 %
BH CV ECHO MEAS - ESV(CUBED): 20.3 ML
BH CV ECHO MEAS - ESV(MOD-SP4): 33.8 ML
BH CV ECHO MEAS - ESV(TEICH): 27.8 ML
BH CV ECHO MEAS - FS: 37.2 %
BH CV ECHO MEAS - IVS/LVPW: 0.88
BH CV ECHO MEAS - IVSD: 0.89 CM
BH CV ECHO MEAS - LA DIMENSION: 2.4 CM
BH CV ECHO MEAS - LA/AO: 0.8
BH CV ECHO MEAS - LAT PEAK E' VEL: 8.6 CM/SEC
BH CV ECHO MEAS - LV DIASTOLIC VOL/BSA (35-75): 38.8 ML/M^2
BH CV ECHO MEAS - LV MASS(C)D: 135.5 GRAMS
BH CV ECHO MEAS - LV MASS(C)DI: 64.1 GRAMS/M^2
BH CV ECHO MEAS - LV MAX PG: 4.4 MMHG
BH CV ECHO MEAS - LV MEAN PG: 2 MMHG
BH CV ECHO MEAS - LV SYSTOLIC VOL/BSA (12-30): 16 ML/M^2
BH CV ECHO MEAS - LV V1 MAX: 105 CM/SEC
BH CV ECHO MEAS - LV V1 MEAN: 69.8 CM/SEC
BH CV ECHO MEAS - LV V1 VTI: 17 CM
BH CV ECHO MEAS - LVIDD: 4.4 CM
BH CV ECHO MEAS - LVIDS: 2.7 CM
BH CV ECHO MEAS - LVLD AP4: 7.3 CM
BH CV ECHO MEAS - LVLS AP4: 6 CM
BH CV ECHO MEAS - LVOT AREA (M): 4.2 CM^2
BH CV ECHO MEAS - LVOT AREA: 4.2 CM^2
BH CV ECHO MEAS - LVOT DIAM: 2.3 CM
BH CV ECHO MEAS - LVPWD: 1 CM
BH CV ECHO MEAS - MED PEAK E' VEL: 8.7 CM/SEC
BH CV ECHO MEAS - MV A MAX VEL: 75.2 CM/SEC
BH CV ECHO MEAS - MV DEC TIME: 0.18 SEC
BH CV ECHO MEAS - MV E MAX VEL: 90.2 CM/SEC
BH CV ECHO MEAS - MV E/A: 1.2
BH CV ECHO MEAS - PA MAX PG: 2.1 MMHG
BH CV ECHO MEAS - PA V2 MAX: 72.3 CM/SEC
BH CV ECHO MEAS - RAP SYSTOLE: 5 MMHG
BH CV ECHO MEAS - RVSP: 20.2 MMHG
BH CV ECHO MEAS - SI(AO): 74.9 ML/M^2
BH CV ECHO MEAS - SI(CUBED): 29.3 ML/M^2
BH CV ECHO MEAS - SI(LVOT): 33.4 ML/M^2
BH CV ECHO MEAS - SI(MOD-SP4): 22.8 ML/M^2
BH CV ECHO MEAS - SI(TEICH): 27.2 ML/M^2
BH CV ECHO MEAS - SV(AO): 158.3 ML
BH CV ECHO MEAS - SV(CUBED): 62 ML
BH CV ECHO MEAS - SV(LVOT): 70.6 ML
BH CV ECHO MEAS - SV(MOD-SP4): 48.2 ML
BH CV ECHO MEAS - SV(TEICH): 57.6 ML
BH CV ECHO MEAS - TR MAX VEL: 195 CM/SEC
BH CV ECHO MEASUREMENTS AVERAGE E/E' RATIO: 10.43
BILIRUB SERPL-MCNC: 0.3 MG/DL (ref 0–1.2)
BUN SERPL-MCNC: 25 MG/DL (ref 6–20)
BUN/CREAT SERPL: 14.7 (ref 7–25)
CALCIUM SPEC-SCNC: 7.6 MG/DL (ref 8.6–10.5)
CHLORIDE SERPL-SCNC: 96 MMOL/L (ref 98–107)
CO2 SERPL-SCNC: 24 MMOL/L (ref 22–29)
CREAT SERPL-MCNC: 1.7 MG/DL (ref 0.76–1.27)
CRP SERPL-MCNC: 16.62 MG/DL (ref 0–0.5)
DEPRECATED RDW RBC AUTO: 48.5 FL (ref 37–54)
EOSINOPHIL # BLD AUTO: 0 10*3/MM3 (ref 0–0.4)
EOSINOPHIL NFR BLD AUTO: 0 % (ref 0.3–6.2)
ERYTHROCYTE [DISTWIDTH] IN BLOOD BY AUTOMATED COUNT: 15.2 % (ref 12.3–15.4)
GFR SERPL CREATININE-BSD FRML MDRD: 45 ML/MIN/1.73
GLOBULIN UR ELPH-MCNC: 3.4 GM/DL
GLUCOSE SERPL-MCNC: 156 MG/DL (ref 65–99)
HCT VFR BLD AUTO: 37.8 % (ref 37.5–51)
HGB BLD-MCNC: 12.4 G/DL (ref 13–17.7)
IMM GRANULOCYTES # BLD AUTO: 0.04 10*3/MM3 (ref 0–0.05)
IMM GRANULOCYTES NFR BLD AUTO: 1 % (ref 0–0.5)
L PNEUMO1 AG UR QL IA: NEGATIVE
LEFT ATRIUM VOLUME INDEX: 16.4 ML/M2
LEFT ATRIUM VOLUME: 34.5 CM3
LV EF 2D ECHO EST: 60 %
LYMPHOCYTES # BLD AUTO: 0.48 10*3/MM3 (ref 0.7–3.1)
LYMPHOCYTES NFR BLD AUTO: 11.5 % (ref 19.6–45.3)
MAXIMAL PREDICTED HEART RATE: 180 BPM
MCH RBC QN AUTO: 28.6 PG (ref 26.6–33)
MCHC RBC AUTO-ENTMCNC: 32.8 G/DL (ref 31.5–35.7)
MCV RBC AUTO: 87.1 FL (ref 79–97)
MONOCYTES # BLD AUTO: 0.18 10*3/MM3 (ref 0.1–0.9)
MONOCYTES NFR BLD AUTO: 4.3 % (ref 5–12)
NEUTROPHILS NFR BLD AUTO: 3.46 10*3/MM3 (ref 1.7–7)
NEUTROPHILS NFR BLD AUTO: 83.2 % (ref 42.7–76)
NRBC BLD AUTO-RTO: 0.5 /100 WBC (ref 0–0.2)
PLATELET # BLD AUTO: 379 10*3/MM3 (ref 140–450)
PMV BLD AUTO: 9.5 FL (ref 6–12)
POTASSIUM SERPL-SCNC: 5.3 MMOL/L (ref 3.5–5.2)
PROCALCITONIN SERPL-MCNC: 0.81 NG/ML (ref 0–0.25)
PROT SERPL-MCNC: 6 G/DL (ref 6–8.5)
QT INTERVAL: 298 MS
QTC INTERVAL: 399 MS
RBC # BLD AUTO: 4.34 10*6/MM3 (ref 4.14–5.8)
S PNEUM AG SPEC QL LA: NEGATIVE
SODIUM SERPL-SCNC: 134 MMOL/L (ref 136–145)
STRESS TARGET HR: 153 BPM
WBC NRBC COR # BLD: 4.16 10*3/MM3 (ref 3.4–10.8)

## 2022-01-17 PROCEDURE — 25010000002 PERFLUTREN 6.52 MG/ML SUSPENSION: Performed by: FAMILY MEDICINE

## 2022-01-17 PROCEDURE — 99222 1ST HOSP IP/OBS MODERATE 55: CPT | Performed by: CLINICAL NURSE SPECIALIST

## 2022-01-17 PROCEDURE — 87899 AGENT NOS ASSAY W/OPTIC: CPT | Performed by: FAMILY MEDICINE

## 2022-01-17 PROCEDURE — 84145 PROCALCITONIN (PCT): CPT | Performed by: FAMILY MEDICINE

## 2022-01-17 PROCEDURE — 25010000002 TOCILIZUMAB 400 MG/20ML SOLUTION 20 ML VIAL: Performed by: INTERNAL MEDICINE

## 2022-01-17 PROCEDURE — M0249 HC INTRAVENOUS INFUSION TOCILIZUMAB 1ST DOSE: HCPCS | Performed by: INTERNAL MEDICINE

## 2022-01-17 PROCEDURE — 94799 UNLISTED PULMONARY SVC/PX: CPT

## 2022-01-17 PROCEDURE — 99254 IP/OBS CNSLTJ NEW/EST MOD 60: CPT | Performed by: INTERNAL MEDICINE

## 2022-01-17 PROCEDURE — 80053 COMPREHEN METABOLIC PANEL: CPT | Performed by: FAMILY MEDICINE

## 2022-01-17 PROCEDURE — 25010000002 ENOXAPARIN PER 10 MG: Performed by: FAMILY MEDICINE

## 2022-01-17 PROCEDURE — 25010000005 REMDESIVIR 100 MG/20ML SOLUTION 1 EACH VIAL: Performed by: FAMILY MEDICINE

## 2022-01-17 PROCEDURE — 85025 COMPLETE CBC W/AUTO DIFF WBC: CPT | Performed by: FAMILY MEDICINE

## 2022-01-17 PROCEDURE — 86140 C-REACTIVE PROTEIN: CPT | Performed by: FAMILY MEDICINE

## 2022-01-17 PROCEDURE — 25010000002 CEFEPIME PER 500 MG: Performed by: FAMILY MEDICINE

## 2022-01-17 PROCEDURE — 93306 TTE W/DOPPLER COMPLETE: CPT

## 2022-01-17 PROCEDURE — 63710000001 DEXAMETHASONE PER 0.25 MG: Performed by: FAMILY MEDICINE

## 2022-01-17 PROCEDURE — 93306 TTE W/DOPPLER COMPLETE: CPT | Performed by: INTERNAL MEDICINE

## 2022-01-17 PROCEDURE — 99497 ADVNCD CARE PLAN 30 MIN: CPT | Performed by: CLINICAL NURSE SPECIALIST

## 2022-01-17 RX ORDER — DIPHENHYDRAMINE HYDROCHLORIDE 50 MG/ML
INJECTION INTRAMUSCULAR; INTRAVENOUS
Status: DISPENSED
Start: 2022-01-17 | End: 2022-01-17

## 2022-01-17 RX ORDER — ZIPRASIDONE MESYLATE 20 MG/ML
10 INJECTION, POWDER, LYOPHILIZED, FOR SOLUTION INTRAMUSCULAR ONCE
Status: DISCONTINUED | OUTPATIENT
Start: 2022-01-17 | End: 2022-01-20

## 2022-01-17 RX ORDER — ALPRAZOLAM 0.25 MG/1
0.25 TABLET ORAL ONCE
Status: COMPLETED | OUTPATIENT
Start: 2022-01-17 | End: 2022-01-17

## 2022-01-17 RX ADMIN — ENOXAPARIN SODIUM 40 MG: 40 INJECTION SUBCUTANEOUS at 20:13

## 2022-01-17 RX ADMIN — PERFLUTREN 9.78 MG: 6.52 INJECTION, SUSPENSION INTRAVENOUS at 09:48

## 2022-01-17 RX ADMIN — REMDESIVIR 200 MG: 100 INJECTION, POWDER, LYOPHILIZED, FOR SOLUTION INTRAVENOUS at 00:01

## 2022-01-17 RX ADMIN — ZINC SULFATE 220 MG (50 MG) CAPSULE 220 MG: CAPSULE at 08:07

## 2022-01-17 RX ADMIN — ALLOPURINOL 100 MG: 100 TABLET ORAL at 08:07

## 2022-01-17 RX ADMIN — CEFEPIME 2 G: 2 INJECTION, POWDER, FOR SOLUTION INTRAVENOUS at 05:35

## 2022-01-17 RX ADMIN — DEXAMETHASONE 6 MG: 4 TABLET ORAL at 08:07

## 2022-01-17 RX ADMIN — CEFEPIME 2 G: 2 INJECTION, POWDER, FOR SOLUTION INTRAVENOUS at 21:00

## 2022-01-17 RX ADMIN — CEFEPIME 2 G: 2 INJECTION, POWDER, FOR SOLUTION INTRAVENOUS at 14:45

## 2022-01-17 RX ADMIN — OXYCODONE HYDROCHLORIDE AND ACETAMINOPHEN 500 MG: 500 TABLET ORAL at 08:07

## 2022-01-17 RX ADMIN — TOCILIZUMAB 800 MG: 20 INJECTION, SOLUTION, CONCENTRATE INTRAVENOUS at 11:01

## 2022-01-17 RX ADMIN — Medication 2000 UNITS: at 08:07

## 2022-01-17 RX ADMIN — SODIUM CHLORIDE 60 ML/HR: 9 INJECTION, SOLUTION INTRAVENOUS at 20:12

## 2022-01-17 RX ADMIN — SODIUM ZIRCONIUM CYCLOSILICATE 10 G: 10 POWDER, FOR SUSPENSION ORAL at 20:13

## 2022-01-17 RX ADMIN — ALPRAZOLAM 0.25 MG: 0.25 TABLET ORAL at 02:55

## 2022-01-17 RX ADMIN — BENZONATATE 100 MG: 100 CAPSULE ORAL at 20:13

## 2022-01-17 RX ADMIN — SODIUM ZIRCONIUM CYCLOSILICATE 10 G: 10 POWDER, FOR SUSPENSION ORAL at 11:01

## 2022-01-17 NOTE — CONSULTS
"Palliative Care Initial Consult   Attending Physician: Billy Koch MD  Referring Provider: Chela Little RN/Billy Koch MD    Reason for Referral: assistance with clarification of goals of care  Family/Support: fatherKevin    Code Status and Medical Interventions:   Ordered at: 01/16/22 1824     Level Of Support Discussed With:    Patient     Code Status (Patient has no pulse and is not breathing):    CPR (Attempt to Resuscitate)     Medical Interventions (Patient has pulse or is breathing):    Full Support     Goals of Care: TBD.    HPI:   41 y.o. male with Down syndrome, morbid obesity, and gout. Patient presented to [facility] on 1/16/2022 related to worsening symptoms of COVID pneumonia and hypoxia.  Recently evaluated in ER on 1/8 but did not receive monoclonal antibodies due to lack of infusions.  Patient did receive [employer]q [employer] S[employer]q vaccine in February 2021, but has not received a booster.  Required maximum Vapotherm on admission.  CT angiogram negative for pulmonary embolism and positive for prominent bilateral pneumonia compatible with COVID.  Pulmonology consulted and plans to add tocilizumab in addition to remdesivir and adjuvants for COVID-19 treatment.  Dr. Rebollar spoke with patient's father in the room this morning and plans are to continue aggressive care interventions including intubation and ventilator support.  Echocardiogram pending.  Palliative Care Spoke With: family patient nonverbal, nods appropriately with simple questions. Entire history gleaned from patient's father who is currently at bedside. Father states patient resides with him \"there are just the two of us\" and both diagnosed with COVID recently \"we were both quarantining. While father has had only mild symptoms reports worsening of symptoms to the point of this hospitalization with patient.  Due to the Palliative Care Topics Discussed: palliative care, goals of care, care options and resuscitation status we " will establish an advance care plan.   Advance Care Planning   Advance Care Planning Discussion: Spoke with father, Kevin who is currently at bedside.  We explored conversations with Dr. Koch as well as guarded prognosis secondary to acute respiratory failure with hypoxia due to COVID-19 pneumonia, high rate of oxygenation needs as patient is currently maxed on Vapotherm and has a nonrebreather mask in place, and multiple co-morbidities.  CODE STATUS clarified and will continue supportive measures but avoid intubation and aggressive CPR interventions.  Explored concerns including limited mobility and addressed oxygenation concerns with mobility. Patient's father feels that mobility and ability to get up in chair would be helpful for patient. These wishes and concerns have been verbalized to nursing, but again stressed the limited ability to mobilize due to hypoxia. Father obviously have difficulties coming to terms with patient's current state of health, but appreciative for conversation and ability to be at bedside. Discussed in the event of further respiratory decline may consider comfort based-care at that juncture.      Review of Systems   Unable to perform ROS: acuity of condition     1- Pain Assessment  CPOT Facial Expression: 0-->relaxed, neutral  CPOT Body Movements: 0-->absence of movements  CPOT Muscle Tension: 0-->relaxed  Ventilator Compliance/Vocalization: 0-->talking in normal tone or no sound  CPOT Score: 0    Past Medical History:   Diagnosis Date   • Down syndrome    • Gout      History reviewed. No pertinent surgical history.  Social History     Socioeconomic History   • Marital status: Single   Tobacco Use   • Smoking status: Never Smoker   • Smokeless tobacco: Never Used   Vaping Use   • Vaping Use: Never used   Substance and Sexual Activity   • Alcohol use: Never   • Drug use: Never   • Sexual activity: Defer       Current Facility-Administered Medications   Medication Dose Route Frequency  Provider Last Rate Last Admin   • acetaminophen (TYLENOL) tablet 650 mg  650 mg Oral Q4H PRN Billy Koch MD        Or   • acetaminophen (TYLENOL) suppository 650 mg  650 mg Rectal Q4H PRN Billy Koch MD       • albuterol sulfate HFA (PROVENTIL HFA;VENTOLIN HFA;PROAIR HFA) inhaler 2 puff  2 puff Inhalation Q6H PRN Billy Koch MD   2 puff at 01/16/22 2250   • allopurinol (ZYLOPRIM) tablet 100 mg  100 mg Oral Daily Billy Koch MD   100 mg at 01/17/22 0807   • ascorbic acid (VITAMIN C) tablet 500 mg  500 mg Oral Daily Billy Koch MD   500 mg at 01/17/22 0807   • benzonatate (TESSALON) capsule 100 mg  100 mg Oral TID PRN Billy Koch MD       • cefepime (MAXIPIME) 2 g/100 mL 0.9% NS (mbp)  2 g Intravenous Q8H Billy Koch MD   2 g at 01/17/22 0535   • cholecalciferol (VITAMIN D3) tablet 2,000 Units  2,000 Units Oral Daily Billy Koch MD   2,000 Units at 01/17/22 0807   • dexamethasone (DECADRON) tablet 6 mg  6 mg Oral Daily Billy Koch MD   6 mg at 01/17/22 0807    Or   • dexamethasone (DECADRON) injection 6 mg  6 mg Intravenous Daily Billy Koch MD       • dextromethorphan polistirex ER (DELSYM) 30 MG/5ML oral suspension 60 mg  60 mg Oral Q12H PRN Billy Koch MD       • diphenhydrAMINE (BENADRYL) 50 MG/ML injection  - ADS Override Pull            • enoxaparin (LOVENOX) syringe 30 mg  30 mg Subcutaneous Q24H Billy Koch MD   30 mg at 01/16/22 2132   • ipratropium-albuterol (DUO-NEB) nebulizer solution 3 mL  3 mL Nebulization Q6H PRN Billy Koch MD       • Pharmacy Consult - Remdesivir   Does not apply Continuous PRN Billy Koch MD       • Pharmacy to Dose Tocilizumab (COVID-19)   Does not apply Once PRN Monae Rebollar MD       • remdesivir 100 mg in sodium chloride 0.9 % 270 mL IVPB (powder vial)  100 mg Intravenous Q24H Billy Koch MD       • sodium chloride 0.9 % flush 10 mL  10 mL Intravenous PRN Fercho Collado MD       • sodium chloride 0.9 %  "infusion  60 mL/hr Intravenous Continuous Billy Koch MD 60 mL/hr at 01/16/22 2132 60 mL/hr at 01/16/22 2132   • sodium zirconium cyclosilicate (LOKELMA) pack 10 g  10 g Oral BID Billy Koch MD       • tocilizumab (ACTEMRA) 800 mg in sodium chloride 0.9 % 100 mL IVPB  800 mg Intravenous Once Monae Rebollar MD       • zinc sulfate (ZINCATE) capsule 220 mg  220 mg Oral Daily Billy Koch MD   220 mg at 01/17/22 0807   • ziprasidone (GEODON) injection 10 mg  10 mg Intramuscular Once Maximus Camarena DO         Pharmacy Consult - Remdesivir,   sodium chloride, 60 mL/hr, Last Rate: 60 mL/hr (01/16/22 2132)      •  acetaminophen **OR** acetaminophen  •  albuterol sulfate HFA  •  benzonatate  •  dextromethorphan polistirex ER  •  ipratropium-albuterol  •  Pharmacy Consult - Remdesivir  •  Pharmacy to Dose Tocilizumab (COVID-19)  •  [COMPLETED] Insert peripheral IV **AND** sodium chloride    No Known Allergies  Current medication reviewed for route, type, dose and frequency and are current per MAR at time of dictation.      Intake/Output Summary (Last 24 hours) at 1/17/2022 1032  Last data filed at 1/17/2022 0804  Gross per 24 hour   Intake 1629.1 ml   Output 1000 ml   Net 629.1 ml       Physical Exam:    Diagnostics: Reviewed  /74   Pulse 89   Temp 98 °F (36.7 °C) (Axillary)   Resp 22   Ht 152.4 cm (60\")   Wt 122 kg (268 lb)   SpO2 93%   BMI 52.34 kg/m²     Vitals and nursing note reviewed.   Constitutional:       Appearance: Ill-appearing and acutely ill-appearing.   Eyes:      General: Lids are normal.   HENT:      Head: Normocephalic.   Pulmonary:      Comments: Vapotherm 40 L FiO2 100%, nonrebreather mask also in place  Cardiovascular:      Normal rate.   Abdominal:      Comments: Tolerating PO intake   Musculoskeletal:      Cervical back: Neck supple. Skin:     Comments: No documented skin injuries   Genitourinary:     Comments: Voiding without difficulty  Neurological:      Mental " Status: Alert. Mental status is at baseline.   Psychiatric:      Comments: Down syndrome     Patient status: Disease state: Controlled with current treatments.  Functional status: Palliative Performance Scale Score: Performance 40% based on the following measures: Ambulation: Mainly in bed, Activity and Evidence of Disease: Unable to do any work, extensive evidence of disease, Self-Care: Mainly assistance required,  Intake: Normal or reduced, LOC: Full, drowsy or confusion   Nutritional status: albumin 2.40. Body mass index is 52.34 kg/m².         Family support: The patient receives support from his father..  Advance Directives: no advance directive of file     POA/Healthcare surrogate-Kevin samayoa-next of kin    Impression/Problem List:    1.  Acute respiratory failure with hypoxia  2.  Pneumonia due to COVID-19 virus  3.  Morbid obesity  4.  Down syndrome  5.  Transaminitis    Recommendations/Plan:  1. plan: Goals of care include CODE STATUS No CPR/Limited support interventions.    2.  Palliative care encounter  -guarded prognosis secondary to acute respiratory failure with hypoxia due to COVID-19 pneumonia, high rate of oxygenation needs as patient is currently maxed on Vapotherm and has a nonrebreather mask in place, and multiple co-morbidities.    -CODE STATUS clarified.     -Explored concerns including limited mobility and addressed oxygenation concerns with mobility. Patient's father feels that mobility and ability to get up in chair would be helpful for patient. These wishes and concerns have been verbalized to nursing, but again stressed the limited ability to mobilize due to hypoxia. Father obviously have difficulties coming to terms with patient's current state of health, but appreciative for conversation and ability to be at bedside.     -Discussed in the event of further respiratory decline may consider comfort based-care at that juncture.       Thank you for this consult and allowing us to  participate in patient's plan of care. Palliative Care Team will continue to follow patient.     Time spent: 89 minutes spent reviewing medical and medication records, assessing and examining patient, discussing with family, answering questions, providing some guidance about a plan and documentation of care, and coordinating care with other healthcare members, with > 50% time spent face to face.   20 minutes spent on advance care planning.    Rafaela Sanders, APRN  1/17/2022

## 2022-01-17 NOTE — CONSULTS
Given patient medications, central line not needed at this time. Speaking with DOMINGO York and pt's father, unsure that patient would tolerate PICC placement without becoming upset and contaminating sterile field.   Midline avoided in this patient due to increased thrombus risk in COVID patients.    20 g 2.5 inch USGPIV placed in left forearm with 1 attempt.    Will reassess as needed.

## 2022-01-17 NOTE — PLAN OF CARE
Goal Outcome Evaluation:  Plan of Care Reviewed With: patient, father        Progress: no change  Outcome Summary: pt no c/o pain, pt distress, anxiety, combative 0230, MD Camarena called, jc rivera called, pt father called in special exemption per MD and house sup. pt hx of downs syndrome, pt nonverbal baseline. soft wrist restraints initiated. pt calmed down after father showed up, pt sat low 90's 40L at 100% vaportherm, nonrebreather. in and out cath 950, 50 ml out per urinal. vss. pt resting comfortably

## 2022-01-17 NOTE — PROGRESS NOTES
"Pharmacy Dosing Service  Anticoagulant  Enoxaparin    Assessment/Action/Plan:  Patient receiving Lovenox for VTE prophylaxis.  Elevated D-dimer  Morbid obesity  COVID-19 infection with increased VTE risk  Acute renal insufficiency  Estimated Creatinine Clearance: 64.4 mL/min (A) (by C-G formula based on SCr of 1.7 mg/dL (H)).   Creatinine   Date Value Ref Range Status   01/17/2022 1.70 (H) 0.76 - 1.27 mg/dL Final   01/16/2022 1.70 (H) 0.76 - 1.27 mg/dL Final   01/07/2022 1.43 (H) 0.76 - 1.27 mg/dL Final   01/12/2021 0.9 0.5 - 1.2 mg/dL Final   01/06/2020 1 0.5 - 1.2 mg/dL Final     Current dose 30 mg subQ q24h. Will adjust to 40 mg subQ q24h for today. Continue to follow.      Subjective:  Kevin Linder is a 41 y.o. male on Enoxaparin 40 mg SQ every 24 hours for indication of VTE prophylaxis.  Objective:  [Ht: 152.4 cm (60\"); Wt: 122 kg (268 lb); BMI: Body mass index is 52.34 kg/m².]  Estimated Creatinine Clearance: 64.4 mL/min (A) (by C-G formula based on SCr of 1.7 mg/dL (H)).   Lab Results   Component Value Date    DDIMER 1.44 (H) 01/16/2022    DDIMER 0.70 (H) 01/07/2022      Lab Results   Component Value Date    INR 0.91 01/07/2022    PROTIME 11.9 01/07/2022      Lab Results   Component Value Date    HGB 12.4 (L) 01/17/2022    HGB 11.8 (L) 01/16/2022    HGB 12.7 (L) 01/07/2022      Lab Results   Component Value Date     01/17/2022     01/16/2022     01/07/2022       Enmanuel Gilbert, PharmD  01/17/22 11:38 CST     "

## 2022-01-17 NOTE — CONSULTS
Pharmacy Consult: Tocilizumab Dosing  Kevin Linder is a 41 y.o. male currently hospitalized for COVID-19 pneumonia. Pharmacy has been consulted by Dr. Rebollar to dose tocilizumab for COVID-19.     Relevant Concomitant Medications (antibiotics/antifungals/antivirals/steroids):  Cefepime, Remdesivir, Dexamethasone     Patient meets the following inclusion criteria:  Hospitalized within 72 hours of treatment consideration with confirmed COVID-19 infection. Date of positive SARS-CoV-2 test: 1/7/22   Date of Hospital Admission: 1/16/22, date of treatment consideration = 01/17/22 - rapidly worsening  Receiving high-flow NC or NIVM with rapidly progressive illness and evidence of systemic inflammation OR within 24 hours of ICU admission and requiring IMV or ECMO.       Patient does not meet the following exclusion criteria:  High concern for or presence of bacterial or fungal coinfection  Patient is receiving baricitinib or other immunomodulators (other than corticosteroids)  Patient/family is considering or has decided to proceed with palliative care.       Plan:  Proceed with tocilizumab 800 (8 mg/kg, rounded to nearest vial size; max dose 800mg) x 1 dose, which will be ordered by pharmacy.   Monitor clinical status and LFTs.     Thank you for this consult. Please contact pharmacy with any questions or concerns.     Enmanuel Gilbert, PharmD  01/17/22 09:49 CST

## 2022-01-17 NOTE — CASE MANAGEMENT/SOCIAL WORK
Continued Stay Note   Rea     Patient Name: Kevin Linder  MRN: [id number]  Today's Date: 1/17/2022    Admit Date: 1/16/2022     Discharge Plan     Row Name 01/17/22 1000       Plan    Plan Attempted to contact patient's father, listed as Kevin Lnider in our chart.  Voicemail states Kevin Roberson and other encounters list father as Kevin Roberson.  Unable to leave message on father's voicemail as it states it was full.  It appears patient resides with father.  Will continue to attempt to reach patient's father.  Patient's father is staying in room with patient.               Discharge Codes    No documentation.                     NADIA Domínguez

## 2022-01-17 NOTE — CONSULTS
INFECTIOUS DISEASES CONSULT NOTE    Patient:  Kevin Linder 41 y.o. male  ROOM # I008/1  YOB: 1980  MRN: [id number]  [facility]:  [id number]  Admit date: 1/16/2022   Admitting Physician: Billy Koch MD  Primary Care Physician: Olimpia Stevens APRN  REFERRING PROVIDER: No ref. provider found      REASON FOR CONSULTATION : Possibly COVID-pneumonia.  Possibly secondary infection      HISTORY OF PRESENT ILLNESS: Patient is a 41-year-old gentleman with Down syndrome who presented to the emergency department January 16 with a chief complaint of cough.  He had previously had a COVID-19 test that was positive.    Review of records reveal the patient was in the emergency department January 7 and tested positive for COVID.    He was hypotensive, tachycardic and had temp of 100.7.  Additionally O2 sats on room air were 73%.    Review of records reveal patient had the [employer] vaccine in July, 2021 and has not received any additional doses of vaccine    Patient was started on cefepime the evening of January 16    Past Medical History:   Diagnosis Date   • Down syndrome    • Gout      History reviewed. No pertinent surgical history.  Family History   Problem Relation Age of Onset   • No Known Problems Mother    • Diabetes Father    • Hypertension Father    • Hyperlipidemia Father      >>>>>>>>>>> patient's father recall that he did have some cardiac surgery as a baby.      Social History     Socioeconomic History   • Marital status: Single   Tobacco Use   • Smoking status: Never Smoker   • Smokeless tobacco: Never Used   Vaping Use   • Vaping Use: Never used   Substance and Sexual Activity   • Alcohol use: Never   • Drug use: Never   • Sexual activity: Defer           Current Meds:     Current Facility-Administered Medications   Medication Dose Route Frequency Provider Last Rate Last Admin   • acetaminophen (TYLENOL) tablet 650 mg  650 mg Oral Q4H PRN Billy Koch MD        Or   • acetaminophen  (TYLENOL) suppository 650 mg  650 mg Rectal Q4H PRN Billy Koch MD       • albuterol sulfate HFA (PROVENTIL HFA;VENTOLIN HFA;PROAIR HFA) inhaler 2 puff  2 puff Inhalation Q6H PRN Billy Koch MD   2 puff at 01/16/22 2250   • allopurinol (ZYLOPRIM) tablet 100 mg  100 mg Oral Daily Billy Koch MD   100 mg at 01/17/22 0807   • ascorbic acid (VITAMIN C) tablet 500 mg  500 mg Oral Daily Billy Koch MD   500 mg at 01/17/22 0807   • benzonatate (TESSALON) capsule 100 mg  100 mg Oral TID PRN Billy Koch MD       • cefepime (MAXIPIME) 2 g/100 mL 0.9% NS (mbp)  2 g Intravenous Q8H Billy Koch MD   2 g at 01/17/22 0535   • cholecalciferol (VITAMIN D3) tablet 2,000 Units  2,000 Units Oral Daily Billy Koch MD   2,000 Units at 01/17/22 0807   • dexamethasone (DECADRON) tablet 6 mg  6 mg Oral Daily Billy Koch MD   6 mg at 01/17/22 0807    Or   • dexamethasone (DECADRON) injection 6 mg  6 mg Intravenous Daily Billy Koch MD       • dextromethorphan polistirex ER (DELSYM) 30 MG/5ML oral suspension 60 mg  60 mg Oral Q12H PRN Billy Koch MD       • diphenhydrAMINE (BENADRYL) 50 MG/ML injection  - ADS Override Pull            • enoxaparin (LOVENOX) syringe 30 mg  30 mg Subcutaneous Q24H Billy Koch MD   30 mg at 01/16/22 2132   • ipratropium-albuterol (DUO-NEB) nebulizer solution 3 mL  3 mL Nebulization Q6H PRN Billy Koch MD       • Pharmacy Consult - Remdesivir   Does not apply Continuous PRN Billy Koch MD       • Pharmacy to Dose Tocilizumab (COVID-19)   Does not apply Once PRN Monae Reobllar MD       • remdesivir 100 mg in sodium chloride 0.9 % 270 mL IVPB (powder vial)  100 mg Intravenous Q24H Billy Koch MD       • sodium chloride 0.9 % flush 10 mL  10 mL Intravenous PRN Fercho Collado MD       • sodium chloride 0.9 % infusion  60 mL/hr Intravenous Continuous Billy Koch MD 60 mL/hr at 01/16/22 2132 60 mL/hr at 01/16/22 2132   • sodium zirconium  "cyclosilicate (LOKELMA) pack 10 g  10 g Oral BID Billy Koch MD   10 g at 22 1101   • tocilizumab (ACTEMRA) 800 mg in sodium chloride 0.9 % 100 mL IVPB  800 mg Intravenous Once Monae Rebollar  mL/hr at 22 1101 800 mg at 22 1101   • zinc sulfate (ZINCATE) capsule 220 mg  220 mg Oral Daily Billy Koch MD   220 mg at 22 0807   • ziprasidone (GEODON) injection 10 mg  10 mg Intramuscular Once Maximus Camarena, DO           Home Meds:  Prior to Admission medications    Medication Sig Start Date End Date Taking? Authorizing Provider   allopurinol (ZYLOPRIM) 300 MG tablet Take 1 tablet by mouth daily 12/15/21      ammonium lactate (LAC-HYDRIN) 12 % lotion Apply topically to affected area(s) daily. 21      indomethacin (INDOCIN) 25 MG capsule Take 1-2 CAPSULES BY MOUTH 3 TIMES A DAY AS NEEDED TAKE WITH FOOD. 3/12/20             No Known Allergies    Review of Systems   Unable to perform ROS: Other            Vital Signs:  /72   Pulse 86   Temp 97.5 °F (36.4 °C) (Axillary)   Resp 22   Ht 152.4 cm (60\")   Wt 122 kg (268 lb)   SpO2 93%   BMI 52.34 kg/m²   Temp (24hrs), Av.7 °F (37.1 °C), Min:97.5 °F (36.4 °C), Max:100.7 °F (38.2 °C)      Physical Exam     General: Patient was evaluated to the glass door of ICU bed 8.  I did attempt to go in a few times however Dr. Koch had to go back in to discuss CODE STATUS and vascular access had to place peripheral IV.  Patient appear morbidly obese and resting lying in bed.  His father was at bedside  HEENT: Vapotherm in place at 40 L and 100%.  Nonrebreather mask noted to be in place as well  Respiratory: Effort even and somewhat labored  Abdomen: Obese in appearance  Neuro/psych: Patient is not agitated and comfortable appearing at this time however has been quite agitated previously hence the reason for his dad being at bedside.        Results Review:    I reviewed the patient's new clinical results.    Lab " Results:  CBC:   Lab Results   Lab 01/16/22  1522 01/17/22 0323   WBC 5.35 4.16   HEMOGLOBIN 11.8* 12.4*   HEMATOCRIT 35.9* 37.8   PLATELETS 331 379       CMP:   Lab Results   Lab 01/16/22  1522 01/17/22 0323   SODIUM 130* 134*   POTASSIUM 4.8 5.3*   CHLORIDE 95* 96*   CO2 24.0 24.0   BUN 21* 25*   CREATININE 1.70* 1.70*   CALCIUM 7.7* 7.6*   BILIRUBIN 0.2 0.3   ALK PHOS 104 120*   ALT (SGPT) 109* 126*   AST (SGOT) 134* 147*   GLUCOSE 156* 156*       COVID LABS:  Results From Last 14 Days   Lab Units 01/17/22 0323 01/16/22 2055 01/16/22 1522 01/07/22 2123   PROBNP pg/mL  --   --  272.4  --    CRP mg/dL 16.62*  --  15.83* 3.59*   D DIMER QUANT mg/L (FEU)  --   --  1.44* 0.70*   FERRITIN ng/mL  --   --  1,432.00*  --    LACTATE mmol/L  --  1.5 3.5* 1.7   PROCALCITONIN ng/mL 0.81*  --  0.81* 0.18   PROTIME Seconds  --   --   --  11.9   INR   --   --   --  0.91   TROPONIN T ng/mL  --   --  <0.010 <0.010         Lab Results (last 72 hours)     Procedure Component Value Units Date/Time    S. Pneumo Ag Urine or CSF - Urine, Urine, Clean Catch [[id number]]  (Normal) Collected: 01/17/22 0336    Specimen: Urine, Clean Catch Updated: 01/17/22 0523     Strep Pneumo Ag Negative    Legionella Antigen, Urine - Urine, Urine, Clean Catch [[id number]]  (Normal) Collected: 01/17/22 0336    Specimen: Urine, Clean Catch Updated: 01/17/22 0523     LEGIONELLA ANTIGEN, URINE Negative    Procalcitonin [[id number]]  (Abnormal) Collected: 01/17/22 0323    Specimen: Blood Updated: 01/17/22 0446     Procalcitonin 0.81 ng/mL     Narrative:      As a Marker for Sepsis (Non-Neonates):     1. <0.5 ng/mL represents a low risk of severe sepsis and/or septic shock.  2. >2 ng/mL represents a high risk of severe sepsis and/or septic shock.    As a Marker for Lower Respiratory Tract Infections that require antibiotic therapy:  PCT on Admission     Antibiotic Therapy             6-12 Hrs later  >0.5                          Strongly Recommended          "   >0.25 - <0.5             Recommended  0.1 - 0.25                  Discouraged                       Remeasure/reassess PCT  <0.1                         Strongly Discouraged         Remeasure/reassess PCT      As 28 day mortality risk marker: \"Change in Procalcitonin Result\" (>80% or <=80%) if Day 0 (or Day 1) and Day 4 values are available. Refer to [email or web address]    Change in PCT <=80 %   A decrease of PCT levels below or equal to 80% defines a positive change in PCT test result representing a higher risk for 28-day all-cause mortality of patients diagnosed with severe sepsis or septic shock.    Change in PCT >80 %   A decrease of PCT levels of more than 80% defines a negative change in PCT result representing a lower risk for 28-day all-cause mortality of patients diagnosed with severe sepsis or septic shock.                Comprehensive Metabolic Panel [[id number]]  (Abnormal) Collected: 01/17/22 0323    Specimen: Blood Updated: 01/17/22 0446     Glucose 156 mg/dL      BUN 25 mg/dL      Creatinine 1.70 mg/dL      Sodium 134 mmol/L      Potassium 5.3 mmol/L      Chloride 96 mmol/L      CO2 24.0 mmol/L      Calcium 7.6 mg/dL      Total Protein 6.0 g/dL      Albumin 2.60 g/dL      ALT (SGPT) 126 U/L      AST (SGOT) 147 U/L      Alkaline Phosphatase 120 U/L      Total Bilirubin 0.3 mg/dL      eGFR Non African Amer 45 mL/min/1.73      Globulin 3.4 gm/dL      A/G Ratio 0.8 g/dL      BUN/Creatinine Ratio 14.7     Anion Gap 14.0 mmol/L     Narrative:      GFR Normal >60  Chronic Kidney Disease <60  Kidney Failure <15      C-reactive Protein [[id number]]  (Abnormal) Collected: 01/17/22 0323    Specimen: Blood Updated: 01/17/22 0446     C-Reactive Protein 16.62 mg/dL     CBC & Differential [[id number]]  (Abnormal) Collected: 01/17/22 0323    Specimen: Blood Updated: 01/17/22 0421    Narrative:      The following orders were created for panel order CBC & Differential.  Procedure                       "         Abnormality         Status                     ---------                               -----------         ------                     CBC Auto Differential[[id number]]        Abnormal            Final result                 Please view results for these tests on the individual orders.    CBC Auto Differential [[id number]]  (Abnormal) Collected: 01/17/22 0323    Specimen: Blood Updated: 01/17/22 0421     WBC 4.16 10*3/mm3      RBC 4.34 10*6/mm3      Hemoglobin 12.4 g/dL      Hematocrit 37.8 %      MCV 87.1 fL      MCH 28.6 pg      MCHC 32.8 g/dL      RDW 15.2 %      RDW-SD 48.5 fl      MPV 9.5 fL      Platelets 379 10*3/mm3      Neutrophil % 83.2 %      Lymphocyte % 11.5 %      Monocyte % 4.3 %      Eosinophil % 0.0 %      Basophil % 0.0 %      Immature Grans % 1.0 %      Neutrophils, Absolute 3.46 10*3/mm3      Lymphocytes, Absolute 0.48 10*3/mm3      Monocytes, Absolute 0.18 10*3/mm3      Eosinophils, Absolute 0.00 10*3/mm3      Basophils, Absolute 0.00 10*3/mm3      Immature Grans, Absolute 0.04 10*3/mm3      nRBC 0.5 /100 WBC     Ferritin [[id number]]  (Abnormal) Collected: 01/16/22 1522    Specimen: Blood Updated: 01/16/22 2241     Ferritin 1,432.00 ng/mL     Narrative:      Results may be falsely decreased if patient taking Biotin.      Hepatitis Panel, Acute [[id number]]  (Normal) Collected: 01/16/22 2055    Specimen: Blood Updated: 01/16/22 2154     Hepatitis B Surface Ag Non-Reactive     Hep A IgM Non-Reactive     Hep B C IgM Non-Reactive     Hepatitis C Ab Non-Reactive    Narrative:      Results may be falsely decreased if patient taking Biotin.     STAT Lactic Acid, Reflex [[id number]]  (Normal) Collected: 01/16/22 2055    Specimen: Blood Updated: 01/16/22 2120     Lactate 1.5 mmol/L     Procalcitonin [[id number]]  (Abnormal) Collected: 01/16/22 1522    Specimen: Blood Updated: 01/16/22 1602     Procalcitonin 0.81 ng/mL     Narrative:      As a Marker for Sepsis (Non-Neonates):     1. <0.5 ng/mL  "represents a low risk of severe sepsis and/or septic shock.  2. >2 ng/mL represents a high risk of severe sepsis and/or septic shock.    As a Marker for Lower Respiratory Tract Infections that require antibiotic therapy:  PCT on Admission     Antibiotic Therapy             6-12 Hrs later  >0.5                          Strongly Recommended            >0.25 - <0.5             Recommended  0.1 - 0.25                  Discouraged                       Remeasure/reassess PCT  <0.1                         Strongly Discouraged         Remeasure/reassess PCT      As 28 day mortality risk marker: \"Change in Procalcitonin Result\" (>80% or <=80%) if Day 0 (or Day 1) and Day 4 values are available. Refer to [email or web address] [email or web address]pct-[email or web address]/    Change in PCT <=80 %   A decrease of PCT levels below or equal to 80% defines a positive change in PCT test result representing a higher risk for 28-day all-cause mortality of patients diagnosed with severe sepsis or septic shock.    Change in PCT >80 %   A decrease of PCT levels of more than 80% defines a negative change in PCT result representing a lower risk for 28-day all-cause mortality of patients diagnosed with severe sepsis or septic shock.                Lactic Acid, Plasma [[id number]]  (Abnormal) Collected: 01/16/22 1522    Specimen: Blood Updated: 01/16/22 1559     Lactate 3.5 mmol/L     C-reactive Protein [[id number]]  (Abnormal) Collected: 01/16/22 1522    Specimen: Blood Updated: 01/16/22 1557     C-Reactive Protein 15.83 mg/dL     Comprehensive Metabolic Panel [[id number]]  (Abnormal) Collected: 01/16/22 1522    Specimen: Blood Updated: 01/16/22 1555     Glucose 156 mg/dL      BUN 21 mg/dL      Creatinine 1.70 mg/dL      Sodium 130 mmol/L      Potassium 4.8 mmol/L      Chloride 95 mmol/L      CO2 24.0 mmol/L      Calcium 7.7 mg/dL      Total Protein 6.4 g/dL      Albumin 2.40 g/dL      ALT (SGPT) 109 U/L      AST (SGOT) 134 U/L      Alkaline Phosphatase 104 U/L      Total " Bilirubin 0.2 mg/dL      eGFR Non African Amer 45 mL/min/1.73      Globulin 4.0 gm/dL      A/G Ratio 0.6 g/dL      BUN/Creatinine Ratio 12.4     Anion Gap 11.0 mmol/L     Narrative:      GFR Normal >60  Chronic Kidney Disease <60  Kidney Failure <15      Troponin [[id number]]  (Normal) Collected: 01/16/22 1522    Specimen: Blood Updated: 01/16/22 1552     Troponin T <0.010 ng/mL     Narrative:      Troponin T Reference Range:  <= 0.03 ng/mL-   Negative for AMI  >0.03 ng/mL-     Abnormal for myocardial necrosis.  Clinicians would have to utilize clinical acumen, EKG, Troponin and serial changes to determine if it is an Acute Myocardial Infarction or myocardial injury due to an underlying chronic condition.       Results may be falsely decreased if patient taking Biotin.      BNP [[id number]]  (Normal) Collected: 01/16/22 1522    Specimen: Blood Updated: 01/16/22 1551     proBNP 272.4 pg/mL     Narrative:      Among patients with dyspnea, NT-proBNP is highly sensitive for the detection of acute congestive heart failure. In addition NT-proBNP of <300 pg/ml effectively rules out acute congestive heart failure with 99% negative predictive value.    Results may be falsely decreased if patient taking Biotin.      Blood Culture - Blood, Arm, Left [[id number]] Collected: 01/16/22 1522    Specimen: Blood from Arm, Left Updated: 01/16/22 1550    Blood Culture - Blood, Arm, Left [[id number]] Collected: 01/16/22 1523    Specimen: Blood from Arm, Left Updated: 01/16/22 1549    D-dimer, Quantitative [[id number]]  (Abnormal) Collected: 01/16/22 1522    Specimen: Blood Updated: 01/16/22 1548     D-Dimer, Quantitative 1.44 mg/L (FEU)     Narrative:      Reference Range is 0-0.50 mg/L FEU. However, results <0.50 mg/L FEU tends to rule out DVT or PE. Results >0.50 mg/L FEU are not useful in predicting absence or presence of DVT or PE.      CBC & Differential [[id number]]  (Abnormal) Collected: 01/16/22 1522    Specimen: Blood Updated:  01/16/22 1535    Narrative:      The following orders were created for panel order CBC & Differential.  Procedure                               Abnormality         Status                     ---------                               -----------         ------                     CBC Auto Differential[[id number]]        Abnormal            Final result                 Please view results for these tests on the individual orders.    CBC Auto Differential [[id number]]  (Abnormal) Collected: 01/16/22 1522    Specimen: Blood Updated: 01/16/22 1535     WBC 5.35 10*3/mm3      RBC 4.10 10*6/mm3      Hemoglobin 11.8 g/dL      Hematocrit 35.9 %      MCV 87.6 fL      MCH 28.8 pg      MCHC 32.9 g/dL      RDW 15.5 %      RDW-SD 49.8 fl      MPV 9.4 fL      Platelets 331 10*3/mm3      Neutrophil % 81.3 %      Lymphocyte % 8.4 %      Monocyte % 8.6 %      Eosinophil % 0.0 %      Basophil % 0.2 %      Immature Grans % 1.5 %      Neutrophils, Absolute 4.35 10*3/mm3      Lymphocytes, Absolute 0.45 10*3/mm3      Monocytes, Absolute 0.46 10*3/mm3      Eosinophils, Absolute 0.00 10*3/mm3      Basophils, Absolute 0.01 10*3/mm3      Immature Grans, Absolute 0.08 10*3/mm3      nRBC 0.0 /100 WBC     COVID-19,[facility] IN-HOUSE,Nasal Swab No Transport Media 3-4 HR TAT - Swab, Nasal Cavity [[id number]]  (Abnormal) Collected: 01/16/22 1420    Specimen: Swab from Nasal Cavity Updated: 01/16/22 1525     COVID19 Detected    Narrative:      Fact sheet for providers: https://www.fda.gov/media/522816/download     Fact sheet for patients: https://www.fda.gov/media/428696/download    Test performed by PCR.    Consider negative results in combination with clinical observations, patient history, and epidemiological information.    Blood Gas, Arterial - [[id number]]  (Abnormal) Collected: 01/16/22 1416    Specimen: Arterial Blood Updated: 01/16/22 1422     Site Right Brachial     Varun's Test Positive     pH, Arterial 7.440 pH units      pCO2, Arterial 37.0 mm  Hg      pO2, Arterial 50.2 mm Hg      Comment: 85 Value below critical limit        HCO3, Arterial 25.1 mmol/L      Base Excess, Arterial 1.1 mmol/L      O2 Saturation, Arterial 85.3 %      Comment: 84 Value below reference range        Temperature 37.0 C      Barometric Pressure for Blood Gas 745 mmHg      Modality NRB     FIO2 100 %      Flow Rate 15.0 lpm      Ventilator Mode NA     Notified Who LUIS FELIPE VELAZQUEZ     Notified By [id number]     Notified Time 01/16/2022 14:32     Collected by [id number]     Comment: Meter: [id number]     :  [id number]        pCO2, Temperature Corrected 37.0 mm Hg      pH, Temp Corrected 7.440 pH Units      pO2, Temperature Corrected 50.2 mm Hg           Estimated Creatinine Clearance: 64.4 mL/min (A) (by C-G formula based on SCr of 1.7 mg/dL (H)).    Culture Results:    Microbiology Results (last 10 days)     Procedure Component Value - Date/Time    Legionella Antigen, Urine - Urine, Urine, Clean Catch [[id number]]  (Normal) Collected: 01/17/22 0336    Lab Status: Final result Specimen: Urine, Clean Catch Updated: 01/17/22 0523     LEGIONELLA ANTIGEN, URINE Negative    S. Pneumo Ag Urine or CSF - Urine, Urine, Clean Catch [[id number]]  (Normal) Collected: 01/17/22 0336    Lab Status: Final result Specimen: Urine, Clean Catch Updated: 01/17/22 0523     Strep Pneumo Ag Negative    COVID-19,[facility] IN-HOUSE,Nasal Swab No Transport Media 3-4 HR TAT - Swab, Nasal Cavity [[id number]]  (Abnormal) Collected: 01/16/22 1420    Lab Status: Final result Specimen: Swab from Nasal Cavity Updated: 01/16/22 1525     COVID19 Detected    Narrative:      Fact sheet for providers: https://www.fda.gov/media/353778/download     Fact sheet for patients: https://www.fda.gov/media/548083/download    Test performed by PCR.    Consider negative results in combination with clinical observations, patient history, and epidemiological information.    Blood Culture - Blood, Arm, Right [[id number]]  (Normal) Collected:  01/07/22 2201    Lab Status: Final result Specimen: Blood from Arm, Right Updated: 01/12/22 2215     Blood Culture No growth at 5 days    COVID PRE-OP / PRE-PROCEDURE SCREENING ORDER (NO ISOLATION) - Swab, Nasal Cavity [[id number]]  (Abnormal) Collected: 01/07/22 2124    Lab Status: Final result Specimen: Swab from Nasal Cavity Updated: 01/07/22 2216    Narrative:      The following orders were created for panel order COVID PRE-OP / PRE-PROCEDURE SCREENING ORDER (NO ISOLATION) - Swab, Nasal Cavity.  Procedure                               Abnormality         Status                     ---------                               -----------         ------                     COVID-19,[facility] IN-MATHEW...[[id number]]  Abnormal            Final result                 Please view results for these tests on the individual orders.    COVID-19,[facility] IN-HOUSE,Nasal Swab No Transport Media 3-4 HR TAT - Swab, Nasal Cavity [[id number]]  (Abnormal) Collected: 01/07/22 2124    Lab Status: Final result Specimen: Swab from Nasal Cavity Updated: 01/07/22 2216     COVID19 Detected    Narrative:      Fact sheet for providers: https://www.fda.gov/media/908788/download     Fact sheet for patients: https://www.fda.gov/media/934032/download    Test performed by PCR.    Consider negative results in combination with clinical observations, patient history, and epidemiological information.    Blood Culture - Blood, Arm, Right [[id number]]  (Normal) Collected: 01/07/22 2123    Lab Status: Final result Specimen: Blood from Arm, Right Updated: 01/12/22 2146     Blood Culture No growth at 5 days    COVID PRE-OP / PRE-PROCEDURE SCREENING ORDER (NO ISOLATION) - Swab, Nasal Cavity [[id number]]  (Abnormal) Collected: 01/07/22 1906    Lab Status: Final result Specimen: Swab from Nasal Cavity Updated: 01/08/22 0029    Narrative:      The following orders were created for panel order COVID PRE-OP / PRE-PROCEDURE SCREENING ORDER (NO ISOLATION) - Swab, Nasal  Cavity.  Procedure                               Abnormality         Status                     ---------                               -----------         ------                     COVID-19,[facility],...[[id number]]  Abnormal            Final result                 Please view results for these tests on the individual orders.    COVID-19,[facility] IN-HOUSE,NP/OP/NASAL SWAB IN UTM/VTM/SALINE/LIQUID AMIES TRANSPORT MEDIA/NP WASH OR ASPIRATE, 24 HR TAT - Swab, Nasal Cavity [[id number]]  (Abnormal) Collected: 01/07/22 1906    Lab Status: Final result Specimen: Swab from Nasal Cavity Updated: 01/08/22 0029     COVID19 Detected    Narrative:      Fact sheet for providers: https://www.fda.gov/media/708170/download     Fact sheet for patients: https://www.fda.gov/media/232162/download    Test performed by RT PCR.             Radiology:   Imaging Results (Last 72 Hours)     Procedure Component Value Units Date/Time    CT Angiogram Chest [[id number]] Collected: 01/16/22 1652     Updated: 01/16/22 1657    Narrative:      EXAMINATION: CT ANGIOGRAM CHEST-      1/16/2022 4:13 PM CST     HISTORY: PE suspected, low/intermediate prob, positive D-dimer;  J96.01-Acute respiratory failure with hypoxia     In order to have a CT radiation dose as low as reasonably achievable  Automated Exposure Control was utilized for adjustment of the mA and/or  KV according to patient size.     DLP in mGycm= 642.     CT angiogram chest.  CT angiography protocol.   CT imaging with bolus IV contrast injection.   Under concurrent supervision axial, sagittal, coronal, and  three-dimensional data sets were constructed.     Comparison is made with January 7, 2022.     Normal heart size.  Normal thoracic aorta.  Symmetric and normally opacified pulmonary arteries.  No pulmonary embolism.     Patchy infiltrate throughout both lungs compatible with COVID pneumonia.     No pneumothorax or pleural effusion.     Summary:  1. No pulmonary  embolism.  2. Prominent bilateral pneumonia.                                   This report was finalized on 01/16/2022 16:54 by Dr. Fabio Johnson MD.    XR Chest 1 View [[id number]] Collected: 01/16/22 1516     Updated: 01/16/22 1521    Narrative:      EXAMINATION: Chest 1 view 1/16/2022     HISTORY: Shortness of air     FINDINGS: Upright frontal projection of chest is compared to prior exam  of 1/7/2022. Lungs are hypoventilated causing accentuation of the  bronchovascular markings and cardiac silhouette. There is diffuse  bilateral pneumonia with extensive consolidation within both lungs. No  effusion or free air present.       Impression:      1. Expiratory chest.  2. Diffuse consolidation within both lungs consistent with either  diffuse pneumonia or pulmonary edema.  This report was finalized on 01/16/2022 15:18 by Dr. Nawaf Hernandez MD.            Assessment/Plan       Acute respiratory failure with hypoxia (HCC)    Pneumonia due to COVID-19 virus    Down syndrome    Obesity, morbid, BMI 50 or higher (HCC)      IMPRESSION:  Acute respiratory failure secondary to COVID-19 infection with pneumonia-patient is currently maxed on Vapotherm with nonrebreather.  Dr. Koch has spoken with the patient's dad at least twice today after discussion with family and patient is now no CPR.  Original test was January 7 (day #11 from positive test) and patient had symptoms 3 days prior according to emergency department visit that day.  (Day #14 from symptom onset) patient currently on dexamethasone, remdesivir started January 16, tocilizumab given today.    Elevated transaminases    Elevated CRP    Acute kidney injury -creatinine was 0.9 on 1/12/2021      RECOMMENDATION:   · Discontinue remdesivir-patient tested +11 days ago and was symptomatic at least 3 days prior to that.  There is no benefit of giving remdesivir at this time  · Continue dexamethasone  · Monitor after receiving tocilizumab  · VTE prophylaxis per  attending  · Adjuvant therapy per attending  · Continue empiric cefepime    Discussed with DOMINGO York MD  01/17/22  11:24 CST

## 2022-01-17 NOTE — PROGRESS NOTES
[facility] Medicine Services  INPATIENT PROGRESS NOTE    Length of Stay: 1  Date of Admission: 1/16/2022  Primary Care Physician: Olimpia Stevens APRN    Subjective   Chief Complaint: COVID-pneumonia.  Hypoxia.  Down syndrome.    HPI   Patient is requiring maxing on Vapotherm and a nonrebreather above that to maintain O2 sat in the low 90s.  Long discussion with the dad about prognosis with BMI of 52 and Down syndrome.  And prognosis after intubation and quality of life.  It was agreed to make the patient DNR/DNI at this point.  We will continue to treat for COVID.  Patient plan to get Tocilizumab today by pulmonary.  Discussed pharmacy about remdesivir.  Ongoing cefepime for secondary infection/prophylaxis.    Review of Systems   Unable to obtain because patient does not talk.  Most information is from the dad.    All pertinent negatives and positives are as above. All other systems have been reviewed and are negative unless otherwise stated.     Objective    Temp:  [97.5 °F (36.4 °C)-100.7 °F (38.2 °C)] 98 °F (36.7 °C)  Heart Rate:  [] 94  Resp:  [22-25] 22  BP: ()/(50-98) 107/78    Intake/Output Summary (Last 24 hours) at 1/17/2022 0938  Last data filed at 1/17/2022 0804  Gross per 24 hour   Intake 1629.1 ml   Output 1000 ml   Net 629.1 ml     Physical Exam  Vitals and nursing note reviewed.   Constitutional:       General: He is not in acute distress.     Appearance: He is well-developed. He is not toxic-appearing.      Comments: Down syndrome features.  Patient does not talk.   HENT:      Head: Normocephalic.      Mouth/Throat:      Mouth: Mucous membranes are moist.      Pharynx: Oropharynx is clear.   Eyes:      General: No scleral icterus.     Extraocular Movements: Extraocular movements intact.      Conjunctiva/sclera: Conjunctivae normal.      Pupils: Pupils are equal, round, and reactive to light.   Neck:      Thyroid: No thyromegaly.      Vascular:  No carotid bruit or JVD.      Trachea: No tracheal deviation.   Cardiovascular:      Rate and Rhythm: Normal rate and regular rhythm.      Pulses: Normal pulses.      Heart sounds: No murmur heard.  No friction rub. No gallop.    Pulmonary:      Effort: No respiratory distress.      Breath sounds: No wheezing, rhonchi or rales.      Comments: Vapotherm.  Diminished breath sound bilateral, clear.  Chest:      Chest wall: No tenderness.   Abdominal:      General: Bowel sounds are normal. There is no distension.      Palpations: Abdomen is soft.      Tenderness: There is no abdominal tenderness.      Comments: Morbid obesity.  BMI is 52.   Musculoskeletal:         General: No swelling or tenderness. Normal range of motion.      Cervical back: Normal range of motion and neck supple. No muscular tenderness.   Skin:     General: Skin is warm and dry.      Capillary Refill: Capillary refill takes 2 to 3 seconds.      Findings: No erythema or rash.   Neurological:      Mental Status: He is alert.      Cranial Nerves: No cranial nerve deficit.      Motor: Weakness present.      Coordination: Coordination abnormal.      Gait: Gait abnormal.   Results Review:  Lab Results (last 24 hours)     Procedure Component Value Units Date/Time    S. Pneumo Ag Urine or CSF - Urine, Urine, Clean Catch [[id number]]  (Normal) Collected: 01/17/22 0336    Specimen: Urine, Clean Catch Updated: 01/17/22 0523     Strep Pneumo Ag Negative    Legionella Antigen, Urine - Urine, Urine, Clean Catch [[id number]]  (Normal) Collected: 01/17/22 0336    Specimen: Urine, Clean Catch Updated: 01/17/22 0523     LEGIONELLA ANTIGEN, URINE Negative    Procalcitonin [[id number]]  (Abnormal) Collected: 01/17/22 0323    Specimen: Blood Updated: 01/17/22 0446     Procalcitonin 0.81 ng/mL     Narrative:      As a Marker for Sepsis (Non-Neonates):     1. <0.5 ng/mL represents a low risk of severe sepsis and/or septic shock.  2. >2 ng/mL represents a high risk of severe  "sepsis and/or septic shock.    As a Marker for Lower Respiratory Tract Infections that require antibiotic therapy:  PCT on Admission     Antibiotic Therapy             6-12 Hrs later  >0.5                          Strongly Recommended            >0.25 - <0.5             Recommended  0.1 - 0.25                  Discouraged                       Remeasure/reassess PCT  <0.1                         Strongly Discouraged         Remeasure/reassess PCT      As 28 day mortality risk marker: \"Change in Procalcitonin Result\" (>80% or <=80%) if Day 0 (or Day 1) and Day 4 values are available. Refer to [email or web address]    Change in PCT <=80 %   A decrease of PCT levels below or equal to 80% defines a positive change in PCT test result representing a higher risk for 28-day all-cause mortality of patients diagnosed with severe sepsis or septic shock.    Change in PCT >80 %   A decrease of PCT levels of more than 80% defines a negative change in PCT result representing a lower risk for 28-day all-cause mortality of patients diagnosed with severe sepsis or septic shock.                Comprehensive Metabolic Panel [[id number]]  (Abnormal) Collected: 01/17/22 0323    Specimen: Blood Updated: 01/17/22 0446     Glucose 156 mg/dL      BUN 25 mg/dL      Creatinine 1.70 mg/dL      Sodium 134 mmol/L      Potassium 5.3 mmol/L      Chloride 96 mmol/L      CO2 24.0 mmol/L      Calcium 7.6 mg/dL      Total Protein 6.0 g/dL      Albumin 2.60 g/dL      ALT (SGPT) 126 U/L      AST (SGOT) 147 U/L      Alkaline Phosphatase 120 U/L      Total Bilirubin 0.3 mg/dL      eGFR Non African Amer 45 mL/min/1.73      Globulin 3.4 gm/dL      A/G Ratio 0.8 g/dL      BUN/Creatinine Ratio 14.7     Anion Gap 14.0 mmol/L     Narrative:      GFR Normal >60  Chronic Kidney Disease <60  Kidney Failure <15      C-reactive Protein [[id number]]  (Abnormal) Collected: 01/17/22 0323    Specimen: Blood Updated: 01/17/22 0446     C-Reactive Protein " 16.62 mg/dL     CBC & Differential [[id number]]  (Abnormal) Collected: 01/17/22 0323    Specimen: Blood Updated: 01/17/22 0421    Narrative:      The following orders were created for panel order CBC & Differential.  Procedure                               Abnormality         Status                     ---------                               -----------         ------                     CBC Auto Differential[[id number]]        Abnormal            Final result                 Please view results for these tests on the individual orders.    CBC Auto Differential [[id number]]  (Abnormal) Collected: 01/17/22 0323    Specimen: Blood Updated: 01/17/22 0421     WBC 4.16 10*3/mm3      RBC 4.34 10*6/mm3      Hemoglobin 12.4 g/dL      Hematocrit 37.8 %      MCV 87.1 fL      MCH 28.6 pg      MCHC 32.8 g/dL      RDW 15.2 %      RDW-SD 48.5 fl      MPV 9.5 fL      Platelets 379 10*3/mm3      Neutrophil % 83.2 %      Lymphocyte % 11.5 %      Monocyte % 4.3 %      Eosinophil % 0.0 %      Basophil % 0.0 %      Immature Grans % 1.0 %      Neutrophils, Absolute 3.46 10*3/mm3      Lymphocytes, Absolute 0.48 10*3/mm3      Monocytes, Absolute 0.18 10*3/mm3      Eosinophils, Absolute 0.00 10*3/mm3      Basophils, Absolute 0.00 10*3/mm3      Immature Grans, Absolute 0.04 10*3/mm3      nRBC 0.5 /100 WBC     Ferritin [[id number]]  (Abnormal) Collected: 01/16/22 1522    Specimen: Blood Updated: 01/16/22 2241     Ferritin 1,432.00 ng/mL     Narrative:      Results may be falsely decreased if patient taking Biotin.      Hepatitis Panel, Acute [[id number]]  (Normal) Collected: 01/16/22 2055    Specimen: Blood Updated: 01/16/22 2154     Hepatitis B Surface Ag Non-Reactive     Hep A IgM Non-Reactive     Hep B C IgM Non-Reactive     Hepatitis C Ab Non-Reactive    Narrative:      Results may be falsely decreased if patient taking Biotin.     STAT Lactic Acid, Reflex [[id number]]  (Normal) Collected: 01/16/22 2055    Specimen: Blood Updated: 01/16/22  "2120     Lactate 1.5 mmol/L     Procalcitonin [[id number]]  (Abnormal) Collected: 01/16/22 1522    Specimen: Blood Updated: 01/16/22 1602     Procalcitonin 0.81 ng/mL     Narrative:      As a Marker for Sepsis (Non-Neonates):     1. <0.5 ng/mL represents a low risk of severe sepsis and/or septic shock.  2. >2 ng/mL represents a high risk of severe sepsis and/or septic shock.    As a Marker for Lower Respiratory Tract Infections that require antibiotic therapy:  PCT on Admission     Antibiotic Therapy             6-12 Hrs later  >0.5                          Strongly Recommended            >0.25 - <0.5             Recommended  0.1 - 0.25                  Discouraged                       Remeasure/reassess PCT  <0.1                         Strongly Discouraged         Remeasure/reassess PCT      As 28 day mortality risk marker: \"Change in Procalcitonin Result\" (>80% or <=80%) if Day 0 (or Day 1) and Day 4 values are available. Refer to [email or web address]    Change in PCT <=80 %   A decrease of PCT levels below or equal to 80% defines a positive change in PCT test result representing a higher risk for 28-day all-cause mortality of patients diagnosed with severe sepsis or septic shock.    Change in PCT >80 %   A decrease of PCT levels of more than 80% defines a negative change in PCT result representing a lower risk for 28-day all-cause mortality of patients diagnosed with severe sepsis or septic shock.                Lactic Acid, Plasma [[id number]]  (Abnormal) Collected: 01/16/22 1522    Specimen: Blood Updated: 01/16/22 1559     Lactate 3.5 mmol/L     C-reactive Protein [[id number]]  (Abnormal) Collected: 01/16/22 1522    Specimen: Blood Updated: 01/16/22 1557     C-Reactive Protein 15.83 mg/dL     Comprehensive Metabolic Panel [[id number]]  (Abnormal) Collected: 01/16/22 1522    Specimen: Blood Updated: 01/16/22 1555     Glucose 156 mg/dL      BUN 21 mg/dL      Creatinine 1.70 mg/dL      Sodium 130 " mmol/L      Potassium 4.8 mmol/L      Chloride 95 mmol/L      CO2 24.0 mmol/L      Calcium 7.7 mg/dL      Total Protein 6.4 g/dL      Albumin 2.40 g/dL      ALT (SGPT) 109 U/L      AST (SGOT) 134 U/L      Alkaline Phosphatase 104 U/L      Total Bilirubin 0.2 mg/dL      eGFR Non African Amer 45 mL/min/1.73      Globulin 4.0 gm/dL      A/G Ratio 0.6 g/dL      BUN/Creatinine Ratio 12.4     Anion Gap 11.0 mmol/L     Narrative:      GFR Normal >60  Chronic Kidney Disease <60  Kidney Failure <15      Troponin [[id number]]  (Normal) Collected: 01/16/22 1522    Specimen: Blood Updated: 01/16/22 1552     Troponin T <0.010 ng/mL     Narrative:      Troponin T Reference Range:  <= 0.03 ng/mL-   Negative for AMI  >0.03 ng/mL-     Abnormal for myocardial necrosis.  Clinicians would have to utilize clinical acumen, EKG, Troponin and serial changes to determine if it is an Acute Myocardial Infarction or myocardial injury due to an underlying chronic condition.       Results may be falsely decreased if patient taking Biotin.      BNP [[id number]]  (Normal) Collected: 01/16/22 1522    Specimen: Blood Updated: 01/16/22 1551     proBNP 272.4 pg/mL     Narrative:      Among patients with dyspnea, NT-proBNP is highly sensitive for the detection of acute congestive heart failure. In addition NT-proBNP of <300 pg/ml effectively rules out acute congestive heart failure with 99% negative predictive value.    Results may be falsely decreased if patient taking Biotin.      Blood Culture - Blood, Arm, Left [[id number]] Collected: 01/16/22 1522    Specimen: Blood from Arm, Left Updated: 01/16/22 1550    Blood Culture - Blood, Arm, Left [[id number]] Collected: 01/16/22 1523    Specimen: Blood from Arm, Left Updated: 01/16/22 1549    D-dimer, Quantitative [[id number]]  (Abnormal) Collected: 01/16/22 1522    Specimen: Blood Updated: 01/16/22 1548     D-Dimer, Quantitative 1.44 mg/L (FEU)     Narrative:      Reference Range is 0-0.50 mg/L FEU. However,  results <0.50 mg/L FEU tends to rule out DVT or PE. Results >0.50 mg/L FEU are not useful in predicting absence or presence of DVT or PE.      CBC & Differential [[id number]]  (Abnormal) Collected: 01/16/22 1522    Specimen: Blood Updated: 01/16/22 1535    Narrative:      The following orders were created for panel order CBC & Differential.  Procedure                               Abnormality         Status                     ---------                               -----------         ------                     CBC Auto Differential[[id number]]        Abnormal            Final result                 Please view results for these tests on the individual orders.    CBC Auto Differential [[id number]]  (Abnormal) Collected: 01/16/22 1522    Specimen: Blood Updated: 01/16/22 1535     WBC 5.35 10*3/mm3      RBC 4.10 10*6/mm3      Hemoglobin 11.8 g/dL      Hematocrit 35.9 %      MCV 87.6 fL      MCH 28.8 pg      MCHC 32.9 g/dL      RDW 15.5 %      RDW-SD 49.8 fl      MPV 9.4 fL      Platelets 331 10*3/mm3      Neutrophil % 81.3 %      Lymphocyte % 8.4 %      Monocyte % 8.6 %      Eosinophil % 0.0 %      Basophil % 0.2 %      Immature Grans % 1.5 %      Neutrophils, Absolute 4.35 10*3/mm3      Lymphocytes, Absolute 0.45 10*3/mm3      Monocytes, Absolute 0.46 10*3/mm3      Eosinophils, Absolute 0.00 10*3/mm3      Basophils, Absolute 0.01 10*3/mm3      Immature Grans, Absolute 0.08 10*3/mm3      nRBC 0.0 /100 WBC     COVID-19,[facility] IN-HOUSE,Nasal Swab No Transport Media 3-4 HR TAT - Swab, Nasal Cavity [[id number]]  (Abnormal) Collected: 01/16/22 1420    Specimen: Swab from Nasal Cavity Updated: 01/16/22 1525     COVID19 Detected    Narrative:      Fact sheet for providers: https://www.fda.gov/media/846775/download     Fact sheet for patients: https://www.fda.gov/media/992689/download    Test performed by PCR.    Consider negative results in combination with clinical observations, patient history, and epidemiological  information.    Blood Gas, Arterial - [[id number]]  (Abnormal) Collected: 01/16/22 1416    Specimen: Arterial Blood Updated: 01/16/22 1422     Site Right Brachial     Varun's Test Positive     pH, Arterial 7.440 pH units      pCO2, Arterial 37.0 mm Hg      pO2, Arterial 50.2 mm Hg      Comment: 85 Value below critical limit        HCO3, Arterial 25.1 mmol/L      Base Excess, Arterial 1.1 mmol/L      O2 Saturation, Arterial 85.3 %      Comment: 84 Value below reference range        Temperature 37.0 C      Barometric Pressure for Blood Gas 745 mmHg      Modality NRB     FIO2 100 %      Flow Rate 15.0 lpm      Ventilator Mode NA     Notified Who LUIS FELIPE VELAZQUEZ     Notified By [id number]     Notified Time 01/16/2022 14:32     Collected by [id number]     Comment: Meter: [id number]     :  [id number]        pCO2, Temperature Corrected 37.0 mm Hg      pH, Temp Corrected 7.440 pH Units      pO2, Temperature Corrected 50.2 mm Hg            Cultures:  No results found for: BLOODCX, URINECX, WOUNDCX, MRSACX, RESPCX, STOOLCX    Radiology Data:    Imaging Results (Last 24 Hours)     Procedure Component Value Units Date/Time    CT Angiogram Chest [[id number]] Collected: 01/16/22 1652     Updated: 01/16/22 1657    Narrative:      EXAMINATION: CT ANGIOGRAM CHEST-      1/16/2022 4:13 PM CST     HISTORY: PE suspected, low/intermediate prob, positive D-dimer;  J96.01-Acute respiratory failure with hypoxia     In order to have a CT radiation dose as low as reasonably achievable  Automated Exposure Control was utilized for adjustment of the mA and/or  KV according to patient size.     DLP in mGycm= 642.     CT angiogram chest.  CT angiography protocol.   CT imaging with bolus IV contrast injection.   Under concurrent supervision axial, sagittal, coronal, and  three-dimensional data sets were constructed.     Comparison is made with January 7, 2022.     Normal heart size.  Normal thoracic aorta.  Symmetric and normally opacified pulmonary  arteries.  No pulmonary embolism.     Patchy infiltrate throughout both lungs compatible with COVID pneumonia.     No pneumothorax or pleural effusion.     Summary:  1. No pulmonary embolism.  2. Prominent bilateral pneumonia.                                   This report was finalized on 01/16/2022 16:54 by Dr. Fabio Johnson MD.    XR Chest 1 View [[id number]] Collected: 01/16/22 1516     Updated: 01/16/22 1521    Narrative:      EXAMINATION: Chest 1 view 1/16/2022     HISTORY: Shortness of air     FINDINGS: Upright frontal projection of chest is compared to prior exam  of 1/7/2022. Lungs are hypoventilated causing accentuation of the  bronchovascular markings and cardiac silhouette. There is diffuse  bilateral pneumonia with extensive consolidation within both lungs. No  effusion or free air present.       Impression:      1. Expiratory chest.  2. Diffuse consolidation within both lungs consistent with either  diffuse pneumonia or pulmonary edema.  This report was finalized on 01/16/2022 15:18 by Dr. Nawaf Hernandez MD.          No Known Allergies    Scheduled meds:   allopurinol, 100 mg, Oral, Daily  ascorbic acid, 500 mg, Oral, Daily  cefepime, 2 g, Intravenous, Q8H  cholecalciferol, 2,000 Units, Oral, Daily  dexamethasone, 6 mg, Oral, Daily   Or  dexamethasone, 6 mg, Intravenous, Daily  diphenhydrAMINE, , ,   enoxaparin, 30 mg, Subcutaneous, Q24H  remdesivir, 100 mg, Intravenous, Q24H  zinc sulfate, 220 mg, Oral, Daily  ziprasidone, 10 mg, Intramuscular, Once        PRN meds:  •  acetaminophen **OR** acetaminophen  •  albuterol sulfate HFA  •  benzonatate  •  dextromethorphan polistirex ER  •  ipratropium-albuterol  •  Pharmacy Consult - Remdesivir  •  [COMPLETED] Insert peripheral IV **AND** sodium chloride    Assessment/Plan       Acute respiratory failure with hypoxia (HCC)    Pneumonia due to COVID-19 virus    Down syndrome    Obesity, morbid, BMI 50 or higher (HCC)      Plan:  Acute respiratory  failure/hypoxia/COVID-pneumonia.  Symptoms of COVID started January 8.  Consult pulmonary for Tocilizumab.  Incentive spirometer . flutter valve.  Vitamin C.  Zinc.  Vitamin D . consult.  Infectious disease.  Cefepime due to elevated procalcitonin.  Consult . Decadron.  Consult pharmacy for remdesivir.  Tocilizumab started by pulmonary.  Chest x-ray-Expiratory chest, Diffuse consolidation within both lungs consistent with either  diffuse pneumonia or pulmonary edema.  CTA of the chest-No pulmonary embolism, prominent bilateral pneumonia.  On Vapotherm 40/100 and a nonrebreather above that.     Hypotension.  Slow IV hydration.     History of Down syndrome.  Echocardiogram pending.     Gout.  Continue allopurinol.     Hyponatremia.  Improving. Normal saline.    Hyperkalemia.  Lokelma x1 day.     Renal insufficiency.    Creatinine stable.  Slow IV hydration     Elevated liver enzyme.    Elevated liver function.  Acute hepatitis pendin-negative    Lovenox prophylaxis renal protocol    Gross morbid obesity/BMI is 42.     Blood cultures pending.  COVID-19- positive.  Legionella-negative.  Strep pneumo-negative.     Discharge Planning:  3-6 days.  DNR.  DNI.  Prognosis guarded.  Palliative has been consulted.    Electronically signed by Billy Koch MD, 01/17/22, 9:38 AM CST.

## 2022-01-17 NOTE — CONSULTS
PULMONARY AND CRITICAL CARE CONSULT - [facility]    Kevin Linder   MR# [id number]  Acct# [id number]  1/17/2022   09:44 CST    Referring Provider: Billy Koch MD    Chief Complaint: Acute respiratory failure due to SARS-CoV-2 infection    HPI: We are consulted by Billy Koch MD to see this 41 y.o. male born on 1980.  Patient has presented with continuous worsening and severe shortness of breath in chest for 9 days in context of Covid-19, with respiratory deterioration requiring escalation of oxygen therapy.     Patient is a 41 years old -American gentleman who was seen as a pulmonary consult in intensive clinic today in COVID isolation from outside the room to reduce the risk of cross infection and exposure to minimize overuse of PPE.  Patient has Down syndrome and unable to provide any history and his father was present in the room at the time of my visit.  According to records the patient was tested positive on 8 January for COVID-19.  Before that he developed nausea vomiting diarrhea fever chills and rigors with night sweats on and off.  The patient was vaccinated with the [employer] vaccine in February 2021 but did not receive any booster dose.    Patient was brought to the ER yesterday and was noted to have hypotension hyponatremia and renal insufficiency and elevated liver enzymes CT scan shows bilateral diffuse groundglass infiltrates Sister with COVID-pneumonia.  The patient was nonverbal and the dad was given the permission to stay in the patient's room with PPE.  He was at the bedside today.    At the time of my visit and after talking to the RN it was noted patient has maximum oxygen support with supplemental oxygen and nonrebreather mask 100% and Vapotherm 40 L flow with 100% FiO2 and still oxygenating in the low 90s.  It was thought patient could be a candidate for Tocilizumab.  No other significant history could be obtained from the patient.    Past Medical  History   has a past medical history of Down syndrome and Gout.   has no past surgical history on file.  No Known Allergies  Medications  allopurinol, 100 mg, Oral, Daily  ascorbic acid, 500 mg, Oral, Daily  cefepime, 2 g, Intravenous, Q8H  cholecalciferol, 2,000 Units, Oral, Daily  dexamethasone, 6 mg, Oral, Daily   Or  dexamethasone, 6 mg, Intravenous, Daily  diphenhydrAMINE, , ,   enoxaparin, 30 mg, Subcutaneous, Q24H  Pharmacy to Dose Tocilizumab (COVID-19), , Does not apply, Once  remdesivir, 100 mg, Intravenous, Q24H  sodium zirconium cyclosilicate, 10 g, Oral, BID  zinc sulfate, 220 mg, Oral, Daily  ziprasidone, 10 mg, Intramuscular, Once      Pharmacy Consult - Remdesivir,   sodium chloride, 60 mL/hr, Last Rate: 60 mL/hr (01/16/22 2132)      Social History   reports that he has never smoked. He has never used smokeless tobacco. He reports that he does not drink alcohol and does not use drugs.  Family History  family history includes Diabetes in his father; Hyperlipidemia in his father; Hypertension in his father; No Known Problems in his mother.  Review of Systems:  Review of Systems   Unable to perform ROS: Other (Patient has Down syndrome and is nonverbal.)      Physical Exam:  Temp:  [97.5 °F (36.4 °C)-100.7 °F (38.2 °C)] 98 °F (36.7 °C)  Heart Rate:  [] 94  Resp:  [22-25] 22  BP: ()/(50-98) 107/78  Intake/Output Summary (Last 24 hours) at 1/17/2022 0944  Last data filed at 1/17/2022 0804  Gross per 24 hour   Intake 1629.1 ml   Output 1000 ml   Net 629.1 ml         01/16/22  1415   Weight: 122 kg (268 lb)     SpO2 Percentage    01/17/22 0910 01/17/22 0915 01/17/22 0930   SpO2: (!) 88% 92% 91%     Body mass index is 52.34 kg/m².   GENERAL/CONSTITUTIONAL: Middle-aged -American male on Vapotherm and nonrebreather mask in no acute distress.  Looks tired and exhausted.  Physical features consistent with Down syndrome.  HEENT: atraumatic, normocephalic.  Extraocular movements intact  NOSE:  normal  NECK: jugular veins nondistended  CHEST: no paradox, no retractions.  No respiratory distress.  CARDIAC: Sinus rhythm  ABDOMEN: nondistended  : No Tadeo's cath in place.  EXTREMITIES: No visible ankle edema.  NEURO:  awake. No seizure activity. No obvious cranial nerve abnormality  PSYCH: no agitation  SKIN: no jaundice.  No rash      Results from last 7 days   Lab Units 01/17/22  0323 01/16/22  1522   WBC 10*3/mm3 4.16 5.35   HEMOGLOBIN g/dL 12.4* 11.8*   PLATELETS 10*3/mm3 379 331     Results from last 7 days   Lab Units 01/17/22  0323 01/16/22  1522   SODIUM mmol/L 134* 130*   POTASSIUM mmol/L 5.3* 4.8   CO2 mmol/L 24.0 24.0   BUN mg/dL 25* 21*   CREATININE mg/dL 1.70* 1.70*   GLUCOSE mg/dL 156* 156*   CRP mg/dL 16.62* 15.83*   PROCALCITONIN ng/mL 0.81* 0.81*   FERRITIN ng/mL  --  1,432.00*   D DIMER QUANT mg/L (FEU)  --  1.44*     Results from last 7 days   Lab Units 01/16/22  1416   PH, ARTERIAL pH units 7.440   PCO2, ARTERIAL mm Hg 37.0   PO2 ART mm Hg 50.2*   FIO2 % 100     No results found for: BLOODCX, URINECX, WOUNDCX, MRSACX, RESPCX, STOOLCX  Lab Results   Component Value Date    PROBNP 272.4 01/16/2022         Recent radiology:   Imaging Results (Last 72 Hours)     Procedure Component Value Units Date/Time    CT Angiogram Chest [[id number]] Collected: 01/16/22 1652     Updated: 01/16/22 1657    Narrative:      EXAMINATION: CT ANGIOGRAM CHEST-      1/16/2022 4:13 PM CST     HISTORY: PE suspected, low/intermediate prob, positive D-dimer;  J96.01-Acute respiratory failure with hypoxia     In order to have a CT radiation dose as low as reasonably achievable  Automated Exposure Control was utilized for adjustment of the mA and/or  KV according to patient size.     DLP in mGycm= 642.     CT angiogram chest.  CT angiography protocol.   CT imaging with bolus IV contrast injection.   Under concurrent supervision axial, sagittal, coronal, and  three-dimensional data sets were constructed.     Comparison  is made with January 7, 2022.     Normal heart size.  Normal thoracic aorta.  Symmetric and normally opacified pulmonary arteries.  No pulmonary embolism.     Patchy infiltrate throughout both lungs compatible with COVID pneumonia.     No pneumothorax or pleural effusion.     Summary:  1. No pulmonary embolism.  2. Prominent bilateral pneumonia.                                   This report was finalized on 01/16/2022 16:54 by Dr. Fabio Johnson MD.    XR Chest 1 View [[id number]] Collected: 01/16/22 1516     Updated: 01/16/22 1521    Narrative:      EXAMINATION: Chest 1 view 1/16/2022     HISTORY: Shortness of air     FINDINGS: Upright frontal projection of chest is compared to prior exam  of 1/7/2022. Lungs are hypoventilated causing accentuation of the  bronchovascular markings and cardiac silhouette. There is diffuse  bilateral pneumonia with extensive consolidation within both lungs. No  effusion or free air present.       Impression:      1. Expiratory chest.  2. Diffuse consolidation within both lungs consistent with either  diffuse pneumonia or pulmonary edema.  This report was finalized on 01/16/2022 15:18 by Dr. Nawaf Hernandez MD.        My radiograph interpretation/independent review of imaging: Reviewed and agree with current interpretation  Other test results (not lab or imaging):  Agree  Independent review of EKG: Done    Problem List as identified by Epic (may contain historical, inactive problems)  Patient Active Problem List   Diagnosis   • Acute respiratory failure with hypoxia (HCC)   • Pneumonia due to COVID-19 virus   • Down syndrome   • Obesity, morbid, BMI 50 or higher (HCC)     Pulmonary Assessment:  Acute respiratory failure due to Covid-19.  Patient has failed pulmonary system.  This is a threat to life or pulmonary function, high risk, due to Covid-19 infection and failure of pulmonary system    New problem (to me), with additional workup planned: Acute respiratory failure with  hypoxia  New problem (to me), no additional workup planned: Covid-19 with adjuvant therapies per others  Other problems either stable, failing to improve or worsenin. Acute hypoxic respiratory failure  2. SARS-COV2 bilateral viral pneumonia  3. Down's syndrome  4. Morbid obesity  5. Acute respiratory distress syndrome with increased oxygen demand    Recommend/plan:   Continue oxygen supplementation for saturation goal of 90%.  He is already has maximum support with nonrebreather mask and Vapotherm  Arterial blood gas from yesterday shows significant hypoxemia.  Repeat blood gas ordered  Patient may need intubation mechanical ventilation.  I talked to patient's father and he agreed to go for intubation if needed.  Started on Tocilizumab pharmacy will dose  He is already getting remdesivir and adjunct treatment for COVID-19 and also started on dexamethasone.  Chest X-ray as indicated, with judicious use to minimize exposure risk to hospital personnel  Arterial blood gas analysis as indicated  Aggressive DVT prophylaxis; Lovenox  Proning candidate: Yes could be difficult due to his body habitus and Down syndrome.  If he gets intubated we will try to do this.  Dexamethasone day 2 of 10  Additional plan:  · Continue current treatment plan and supportive care  · Continue nutritional support.  · Patient's family staying in the room due to his mental status.  · Discussed with Dr. Koch.  Dr. Koch feels patient should get a palliative care consult  · Repeat labs and imaging studies and monitor inflammatory markers.  · Arterial blood gas and imaging studies as needed.  · If patient gets intubated he will need nutritional support.  · Adequate pain and anxiety control  · CODE STATUS: Full code.  Overall prognosis: Guarded  · Pulmonary team will continue following and make further recommendations  · Total time spent in seeing this patient as a pulmonary consult 45 minutes.    Thank you for this consult.  We will follow  along.    Electronically signed by     Monae Rebollar MD   Pulmonologist/intensivist   on 1/17/2022 at 09:44 CST

## 2022-01-18 LAB
ALBUMIN SERPL-MCNC: 2.4 G/DL (ref 3.5–5.2)
ALBUMIN/GLOB SERPL: 0.6 G/DL
ALP SERPL-CCNC: 102 U/L (ref 39–117)
ALT SERPL W P-5'-P-CCNC: 100 U/L (ref 1–41)
ANION GAP SERPL CALCULATED.3IONS-SCNC: 14 MMOL/L (ref 5–15)
ANISOCYTOSIS BLD QL: ABNORMAL
AST SERPL-CCNC: 83 U/L (ref 1–40)
BILIRUB SERPL-MCNC: 0.3 MG/DL (ref 0–1.2)
BUN SERPL-MCNC: 34 MG/DL (ref 6–20)
BUN/CREAT SERPL: 24.1 (ref 7–25)
CALCIUM SPEC-SCNC: 8.2 MG/DL (ref 8.6–10.5)
CHLORIDE SERPL-SCNC: 99 MMOL/L (ref 98–107)
CLUMPED PLATELETS: PRESENT
CO2 SERPL-SCNC: 23 MMOL/L (ref 22–29)
CREAT SERPL-MCNC: 1.41 MG/DL (ref 0.76–1.27)
CRP SERPL-MCNC: 6.58 MG/DL (ref 0–0.5)
DEPRECATED RDW RBC AUTO: 49.3 FL (ref 37–54)
ERYTHROCYTE [DISTWIDTH] IN BLOOD BY AUTOMATED COUNT: 15.3 % (ref 12.3–15.4)
FERRITIN SERPL-MCNC: 1209 NG/ML (ref 30–400)
GFR SERPL CREATININE-BSD FRML MDRD: 56 ML/MIN/1.73
GIANT PLATELETS: ABNORMAL
GLOBULIN UR ELPH-MCNC: 4.2 GM/DL
GLUCOSE SERPL-MCNC: 130 MG/DL (ref 65–99)
HCT VFR BLD AUTO: 37.4 % (ref 37.5–51)
HGB BLD-MCNC: 12.1 G/DL (ref 13–17.7)
LYMPHOCYTES # BLD MANUAL: 0.51 10*3/MM3 (ref 0.7–3.1)
LYMPHOCYTES NFR BLD MANUAL: 13.3 % (ref 5–12)
MCH RBC QN AUTO: 28.3 PG (ref 26.6–33)
MCHC RBC AUTO-ENTMCNC: 32.4 G/DL (ref 31.5–35.7)
MCV RBC AUTO: 87.4 FL (ref 79–97)
MONOCYTES # BLD: 0.95 10*3/MM3 (ref 0.1–0.9)
NEUTROPHILS # BLD AUTO: 5.59 10*3/MM3 (ref 1.7–7)
NEUTROPHILS NFR BLD MANUAL: 76.5 % (ref 42.7–76)
NEUTS BAND NFR BLD MANUAL: 2 % (ref 0–5)
NRBC SPEC MANUAL: 3.1 /100 WBC (ref 0–0.2)
PLASMA CELL PREC NFR BLD MANUAL: 1 % (ref 0–0)
PLATELET # BLD AUTO: 423 10*3/MM3 (ref 140–450)
PMV BLD AUTO: 9.6 FL (ref 6–12)
POIKILOCYTOSIS BLD QL SMEAR: ABNORMAL
POTASSIUM SERPL-SCNC: 4.9 MMOL/L (ref 3.5–5.2)
PROT SERPL-MCNC: 6.6 G/DL (ref 6–8.5)
RBC # BLD AUTO: 4.28 10*6/MM3 (ref 4.14–5.8)
SODIUM SERPL-SCNC: 136 MMOL/L (ref 136–145)
VARIANT LYMPHS NFR BLD MANUAL: 3.1 % (ref 0–5)
VARIANT LYMPHS NFR BLD MANUAL: 4.1 % (ref 19.6–45.3)
WBC MORPH BLD: NORMAL
WBC NRBC COR # BLD: 7.11 10*3/MM3 (ref 3.4–10.8)

## 2022-01-18 PROCEDURE — 86140 C-REACTIVE PROTEIN: CPT | Performed by: FAMILY MEDICINE

## 2022-01-18 PROCEDURE — 36415 COLL VENOUS BLD VENIPUNCTURE: CPT | Performed by: FAMILY MEDICINE

## 2022-01-18 PROCEDURE — 25010000002 CEFEPIME PER 500 MG: Performed by: FAMILY MEDICINE

## 2022-01-18 PROCEDURE — 94799 UNLISTED PULMONARY SVC/PX: CPT

## 2022-01-18 PROCEDURE — 99232 SBSQ HOSP IP/OBS MODERATE 35: CPT | Performed by: CLINICAL NURSE SPECIALIST

## 2022-01-18 PROCEDURE — 80053 COMPREHEN METABOLIC PANEL: CPT | Performed by: FAMILY MEDICINE

## 2022-01-18 PROCEDURE — 99233 SBSQ HOSP IP/OBS HIGH 50: CPT | Performed by: INTERNAL MEDICINE

## 2022-01-18 PROCEDURE — 85007 BL SMEAR W/DIFF WBC COUNT: CPT | Performed by: FAMILY MEDICINE

## 2022-01-18 PROCEDURE — 82728 ASSAY OF FERRITIN: CPT | Performed by: FAMILY MEDICINE

## 2022-01-18 PROCEDURE — 85025 COMPLETE CBC W/AUTO DIFF WBC: CPT | Performed by: FAMILY MEDICINE

## 2022-01-18 PROCEDURE — 63710000001 DEXAMETHASONE PER 0.25 MG: Performed by: FAMILY MEDICINE

## 2022-01-18 PROCEDURE — 94640 AIRWAY INHALATION TREATMENT: CPT

## 2022-01-18 PROCEDURE — 25010000002 ENOXAPARIN PER 10 MG: Performed by: FAMILY MEDICINE

## 2022-01-18 RX ORDER — ALBUTEROL SULFATE 90 UG/1
2 AEROSOL, METERED RESPIRATORY (INHALATION)
Status: DISCONTINUED | OUTPATIENT
Start: 2022-01-18 | End: 2022-01-21 | Stop reason: HOSPADM

## 2022-01-18 RX ORDER — GUAIFENESIN 600 MG/1
600 TABLET, EXTENDED RELEASE ORAL EVERY 12 HOURS SCHEDULED
Status: DISCONTINUED | OUTPATIENT
Start: 2022-01-18 | End: 2022-01-21 | Stop reason: HOSPADM

## 2022-01-18 RX ORDER — FAMOTIDINE 20 MG/1
20 TABLET, FILM COATED ORAL
Status: DISCONTINUED | OUTPATIENT
Start: 2022-01-18 | End: 2022-01-21 | Stop reason: HOSPADM

## 2022-01-18 RX ORDER — BUDESONIDE AND FORMOTEROL FUMARATE DIHYDRATE 160; 4.5 UG/1; UG/1
2 AEROSOL RESPIRATORY (INHALATION)
Status: DISCONTINUED | OUTPATIENT
Start: 2022-01-18 | End: 2022-01-21 | Stop reason: HOSPADM

## 2022-01-18 RX ORDER — TAMSULOSIN HYDROCHLORIDE 0.4 MG/1
0.4 CAPSULE ORAL DAILY
Status: DISCONTINUED | OUTPATIENT
Start: 2022-01-18 | End: 2022-01-21 | Stop reason: HOSPADM

## 2022-01-18 RX ADMIN — BUDESONIDE AND FORMOTEROL FUMARATE DIHYDRATE 2 PUFF: 160; 4.5 AEROSOL RESPIRATORY (INHALATION) at 11:36

## 2022-01-18 RX ADMIN — CEFEPIME 2 G: 2 INJECTION, POWDER, FOR SOLUTION INTRAVENOUS at 21:46

## 2022-01-18 RX ADMIN — Medication 2000 UNITS: at 09:45

## 2022-01-18 RX ADMIN — GUAIFENESIN 600 MG: 600 TABLET, EXTENDED RELEASE ORAL at 13:26

## 2022-01-18 RX ADMIN — GUAIFENESIN 600 MG: 600 TABLET, EXTENDED RELEASE ORAL at 20:23

## 2022-01-18 RX ADMIN — BENZONATATE 100 MG: 100 CAPSULE ORAL at 20:23

## 2022-01-18 RX ADMIN — ALLOPURINOL 100 MG: 100 TABLET ORAL at 09:45

## 2022-01-18 RX ADMIN — ALBUTEROL SULFATE 2 PUFF: 90 AEROSOL, METERED RESPIRATORY (INHALATION) at 15:50

## 2022-01-18 RX ADMIN — CEFEPIME 2 G: 2 INJECTION, POWDER, FOR SOLUTION INTRAVENOUS at 06:12

## 2022-01-18 RX ADMIN — ALBUTEROL SULFATE 2 PUFF: 90 AEROSOL, METERED RESPIRATORY (INHALATION) at 18:20

## 2022-01-18 RX ADMIN — ZINC SULFATE 220 MG (50 MG) CAPSULE 220 MG: CAPSULE at 09:45

## 2022-01-18 RX ADMIN — BUDESONIDE AND FORMOTEROL FUMARATE DIHYDRATE 2 PUFF: 160; 4.5 AEROSOL RESPIRATORY (INHALATION) at 18:21

## 2022-01-18 RX ADMIN — ALBUTEROL SULFATE 2 PUFF: 90 AEROSOL, METERED RESPIRATORY (INHALATION) at 11:37

## 2022-01-18 RX ADMIN — ENOXAPARIN SODIUM 40 MG: 40 INJECTION SUBCUTANEOUS at 20:23

## 2022-01-18 RX ADMIN — OXYCODONE HYDROCHLORIDE AND ACETAMINOPHEN 500 MG: 500 TABLET ORAL at 09:45

## 2022-01-18 RX ADMIN — DEXAMETHASONE 6 MG: 4 TABLET ORAL at 09:45

## 2022-01-18 RX ADMIN — CEFEPIME 2 G: 2 INJECTION, POWDER, FOR SOLUTION INTRAVENOUS at 13:26

## 2022-01-18 RX ADMIN — FAMOTIDINE 20 MG: 20 TABLET, FILM COATED ORAL at 17:22

## 2022-01-18 RX ADMIN — TAMSULOSIN HYDROCHLORIDE 0.4 MG: 0.4 CAPSULE ORAL at 17:24

## 2022-01-18 NOTE — CASE MANAGEMENT/SOCIAL WORK
Discharge Planning Assessment  [facility]     Patient Name: Kevin Linder  MRN: [id number]  Today's Date: 1/18/2022    Admit Date: 1/16/2022     Discharge Needs Assessment     Row Name 01/18/22 0915       Living Environment    Lives With parent(s)    Name(s) of Who Lives With Patient Father - Kevin Rboerson    Current Living Arrangements home/apartment/condo    Primary Care Provided by parent(s)    Provides Primary Care For no one    Family Caregiver if Needed parent(s)    Quality of Family Relationships supportive; helpful; involved    Able to Return to Prior Arrangements yes       Resource/Environmental Concerns    Resource/Environmental Concerns none       Transition Planning    Patient/Family Anticipated Services at Transition     Transportation Anticipated family or friend will provide       Discharge Needs Assessment    Readmission Within the Last 30 Days no previous admission in last 30 days    Equipment Currently Used at Home none    Concerns to be Addressed discharge planning    Current Discharge Risk cognitively impaired    Discharge Coordination/Progress Patient resides at home with his father.  Patient continues in ICU on vapotherm.  Patient has a PCP and RX coverage.  SW following for plan/needs.               Discharge Plan    No documentation.               Continued Care and Services - Admitted Since 1/16/2022    Coordination has not been started for this encounter.          Demographic Summary    No documentation.                Functional Status    No documentation.                Psychosocial    No documentation.                Abuse/Neglect    No documentation.                Legal    No documentation.                Substance Abuse    No documentation.                Patient Forms    No documentation.                   NADIA Domínguez

## 2022-01-18 NOTE — PROGRESS NOTES
PULMONARY AND CRITICAL CARE PROGRESS NOTE - [facility]    Patient: Kevin Linder  1980   MR# [id number]   Acct# [id number]  01/18/22   10:29 CST  Referring Provider: Billy Koch MD    Chief Complaint: Covid-19     Interval history: He remains in COVID isolation.  Father at the bedside.  He is afebrile.  Infectious disease following.  He remains on empiric cefepime.  He did receive Tocilizumab.  Remdesivir discontinued due to length of illness.  CRP 6.5.  No chest x-ray for review.  He is, breathing comfortably with a saturation of 92 on 40 L and FiO2 1.0.  Intermittent use of nonrebreather.  Tolerating repositioning side to side per staff.    He is cefepime D#3.  Will change bronchodilation to scheduled.  We will also order Mucinex for mucus clearing.  We will add Pepcid while he is receiving steroids.  He is on dexamethasone D#3.  He is on DVT prophylaxis, Lovenox 40 daily.  Encourage nutrition, hydration and mobilization.  Encourage proning.  Monitor closely for respiratory deterioration.  Palliative care now following.  Comfort based care being considered if respiratory status worsens.  Continue current therapies for now.    Meds:  allopurinol, 100 mg, Oral, Daily  ascorbic acid, 500 mg, Oral, Daily  cefepime, 2 g, Intravenous, Q8H  cholecalciferol, 2,000 Units, Oral, Daily  dexamethasone, 6 mg, Oral, Daily   Or  dexamethasone, 6 mg, Intravenous, Daily  enoxaparin, 40 mg, Subcutaneous, Q24H  zinc sulfate, 220 mg, Oral, Daily  ziprasidone, 10 mg, Intramuscular, Once      Pharmacy Consult - Remdesivir,       Review of Systems:   Review of Systems   Unable to perform ROS: Other   Patient has Down syndrome    Physical Exam:  SpO2 Percentage    01/18/22 0545 01/18/22 0700 01/18/22 0730   SpO2: 94% (!) 87% 90%     Temp:  [97 °F (36.1 °C)-97.8 °F (36.6 °C)] 97.8 °F (36.6 °C)  Heart Rate:  [73-98] 78  Resp:  [16-28] 21  BP: ()/(55-92) 113/92    Intake/Output Summary (Last 24 hours) at  1/18/2022 1029  Last data filed at 1/18/2022 0948  Gross per 24 hour   Intake 1108.1 ml   Output 1000 ml   Net 108.1 ml     Body mass index is 53.47 kg/m².     GENERAL/CONSTITUTIONAL: No distress.  Down syndrome features  HEENT: atraumatic, normocephalic. Extraocular movements normal  NOSE: normal, 40 L and FiO2 1.0 on Vapotherm  NECK: jugular veins nondistended  CHEST: no paradox, no retractions.  No respiratory distress.   CARDIAC: Sinus rhythm, rate 79  ABDOMEN: nondistended, obese  : Did not examine  EXTREMITIES: No edema.  NEURO: Awake, alert, calm, sitting up  PSYCH: no agitation  SKIN: no jaundice.  No rash    Electronically signed by CHANDLER Fuentes, 1/18/2022, 10:29 CST        Physician Substantive Portion: Medical Decision Making    Laboratory Data:  Results from last 7 days   Lab Units 01/18/22  0310 01/17/22  0323 01/16/22  1522   WBC 10*3/mm3 7.11 4.16 5.35   HEMOGLOBIN g/dL 12.1* 12.4* 11.8*   PLATELETS 10*3/mm3 423 379 331     Results from last 7 days   Lab Units 01/18/22  0310 01/17/22  0323 01/16/22  1522   SODIUM mmol/L 136 134* 130*   POTASSIUM mmol/L 4.9 5.3* 4.8   BUN mg/dL 34* 25* 21*   CREATININE mg/dL 1.41* 1.70* 1.70*     Results from last 7 days   Lab Units 01/16/22  1416   PH, ARTERIAL pH units 7.440   PCO2, ARTERIAL mm Hg 37.0   PO2 ART mm Hg 50.2*   FIO2 % 100     Blood Culture   Date Value Ref Range Status   01/16/2022 No growth at 24 hours  Preliminary   01/16/2022 No growth at 24 hours  Preliminary     Results from last 7 days   Lab Units 01/18/22  0310 01/17/22  0323 01/16/22  1522 01/16/22  1522   CRP mg/dL 6.58* 16.62*   < > 15.83*   PROCALCITONIN ng/mL  --  0.81*  --  0.81*   FERRITIN ng/mL 1,209.00*  --   --  1,432.00*   D DIMER QUANT mg/L (FEU)  --   --   --  1.44*    < > = values in this interval not displayed.      Recent films:  XR Chest 1 View    Result Date: 1/16/2022  1. Expiratory chest. 2. Diffuse consolidation within both lungs consistent with either diffuse  pneumonia or pulmonary edema. This report was finalized on 01/16/2022 15:18 by Dr. Nawaf Hernandez MD.    My radiograph interpretation/independent review of imaging: Reviewed and agree with current interpretation.    Pulmonary Assessment:    1. Acute hypoxic respiratory failure  2. SARS-COV2 bilateral viral pneumonia  3. Down's syndrome  4. Morbid obesity  5. Acute respiratory distress syndrome with increased oxygen demand  6. Patient is DNR status.    Recommend/plan:   · Patient is stable from respiratory standpoint and is currently on Vapotherm 40 L 100% FiO2 and also requiring nonrebreather from time to time.  · Father was present in the room.  Patient was seen by palliative care and he is a DNR now.  · Continue current treatment plan for COVID with dexamethasone day # 3.    · He is on remdesivir and also on adjunct treatment for COVID-19.  · Continue routine respiratory care and pulmonary toilet.  Patient is unable to do much incentive spirometry.  · He has morbid obesity and obstructive sleep apnea and will benefit from BiPAP but he is unable to tolerate BiPAP.  · Continue DVT prophylaxis with Lovenox and stress ulcer prophylaxis  · Continue nutritional support.  Repeat labs and imaging studies and monitor inflammatory markers.  · Infectious disease team following  · CODE STATUS: Patient is DNR/Perative care following.  · Overall prognosis: Guarded.  · Discussed care plan with Dr. Koch  · We will follow.      This visit was performed by both a physician and an [facility] RN.  I personally evaluated and examined the patient.  I performed all aspects of the medical decision making as documented.    Electronically signed by     Monae Rebollar MD,  Pulmonologist/intensivist   1/18/2022, 18:28 CST

## 2022-01-18 NOTE — PROGRESS NOTES
INFECTIOUS DISEASES PROGRESS NOTE    Patient:  Kevin Linder  YOB: 1980  MRN: [id number]   Admit date: 1/16/2022   Admitting Physician: Billy Koch MD  Primary Care Physician: Olimpia Stevens APRN    Chief Complaint: Not obtainable from patient        Interval History: Patient's father at bedside.  He reports patient actually did well lying flat turning side to side yesterday.    Discussed with DOMINGO Tom.  Patient apparently would saturate in the 99% range when he was sleeping.  When he is awake he is in the lower 90s.  He remains on maximum Vapotherm with a nonrebreather.    Allergies: No Known Allergies    Current Meds:     Current Facility-Administered Medications:   •  acetaminophen (TYLENOL) tablet 650 mg, 650 mg, Oral, Q4H PRN **OR** acetaminophen (TYLENOL) suppository 650 mg, 650 mg, Rectal, Q4H PRN, Billy Koch MD  •  albuterol sulfate HFA (PROVENTIL HFA;VENTOLIN HFA;PROAIR HFA) inhaler 2 puff, 2 puff, Inhalation, Q6H PRN, Billy Koch MD, 2 puff at 01/16/22 2250  •  allopurinol (ZYLOPRIM) tablet 100 mg, 100 mg, Oral, Daily, Billy Koch MD, 100 mg at 01/17/22 0807  •  ascorbic acid (VITAMIN C) tablet 500 mg, 500 mg, Oral, Daily, Billy Koch MD, 500 mg at 01/17/22 0807  •  benzonatate (TESSALON) capsule 100 mg, 100 mg, Oral, TID PRN, Billy Koch MD, 100 mg at 01/17/22 2013  •  cefepime (MAXIPIME) 2 g/100 mL 0.9% NS (mbp), 2 g, Intravenous, Q8H, Billy Koch MD, 2 g at 01/18/22 0612  •  cholecalciferol (VITAMIN D3) tablet 2,000 Units, 2,000 Units, Oral, Daily, Billy Koch MD, 2,000 Units at 01/17/22 0807  •  dexamethasone (DECADRON) tablet 6 mg, 6 mg, Oral, Daily, 6 mg at 01/17/22 0807 **OR** dexamethasone (DECADRON) injection 6 mg, 6 mg, Intravenous, Daily, Billy Koch MD  •  dextromethorphan polistirex ER (DELSYM) 30 MG/5ML oral suspension 60 mg, 60 mg, Oral, Q12H PRN, Billy Koch MD  •  enoxaparin (LOVENOX) syringe 40 mg, 40 mg, Subcutaneous, Q24H,  "Billy Koch MD, 40 mg at 22  •  ipratropium-albuterol (DUO-NEB) nebulizer solution 3 mL, 3 mL, Nebulization, Q6H PRN, Billy Koch MD  •  Pharmacy Consult - Remdesivir, , Does not apply, Continuous PRN, Billy Koch MD  •  [COMPLETED] Insert peripheral IV, , , Once **AND** sodium chloride 0.9 % flush 10 mL, 10 mL, Intravenous, PRN, Fercho Collado MD  •  sodium chloride 0.9 % infusion, 60 mL/hr, Intravenous, Continuous, Billy Koch MD, Last Rate: 60 mL/hr at 22, 60 mL/hr at 22  •  zinc sulfate (ZINCATE) capsule 220 mg, 220 mg, Oral, Daily, Billy Koch MD, 220 mg at 22 0807  •  ziprasidone (GEODON) injection 10 mg, 10 mg, Intramuscular, Once, Maximus Camarena,       Review of Systems    Not obtained directly from patient.    Objective     Vital Signs:  Temp (24hrs), Av.4 °F (36.3 °C), Min:97 °F (36.1 °C), Max:97.8 °F (36.6 °C)      /92   Pulse 78   Temp 97.8 °F (36.6 °C) (Axillary)   Resp 21   Ht 152.4 cm (60\")   Wt 124 kg (273 lb 12.8 oz)   SpO2 90%   BMI 53.47 kg/m²         Physical Exam     General: Patient is a morbidly obese male sitting up in bed in no acute distress  HEENT: Nasal cannula in place and connected to Vapotherm.  Nonrebreather mask in place as well.  Respiratory: Effort was even and minimally labored.  He is on Vapotherm at 40 L and 100% with nonrebreather  Neuro: Patient was awake but did not respond directly to me.  His father was at bedside  Psych: Patient was not agitated      Results Review:    I reviewed the patient's new clinical results.    Lab Results:  CBC:   Lab Results   Lab 22  1522 22  0323 22  0310   WBC 5.35 4.16 7.11   HEMOGLOBIN 11.8* 12.4* 12.1*   HEMATOCRIT 35.9* 37.8 37.4*   PLATELETS 331 379 423   LYMPHOCYTE %  --   --  4.1*   MONOCYTES %  --   --  13.3*         CMP:   Lab Results   Lab 22  1522 22  0323 22  0310   SODIUM 130* 134* 136   POTASSIUM 4.8 " 5.3* 4.9   CHLORIDE 95* 96* 99   CO2 24.0 24.0 23.0   BUN 21* 25* 34*   CREATININE 1.70* 1.70* 1.41*   CALCIUM 7.7* 7.6* 8.2*   BILIRUBIN 0.2 0.3 0.3   ALK PHOS 104 120* 102   ALT (SGPT) 109* 126* 100*   AST (SGOT) 134* 147* 83*   GLUCOSE 156* 156* 130*       Estimated Creatinine Clearance: 78.4 mL/min (A) (by C-G formula based on SCr of 1.41 mg/dL (H)).    COVID LABS:  Results From Last 14 Days   Lab Units 01/18/22  0310 01/17/22  0323 01/16/22 2055 01/16/22  1522 01/07/22 2123 01/07/22 2123   PROBNP pg/mL  --   --   --  272.4  --   --    CRP mg/dL 6.58* 16.62*  --  15.83*   < > 3.59*   D DIMER QUANT mg/L (FEU)  --   --   --  1.44*  --  0.70*   FERRITIN ng/mL 1,209.00*  --   --  1,432.00*  --   --    LACTATE mmol/L  --   --  1.5 3.5*  --  1.7   PROCALCITONIN ng/mL  --  0.81*  --  0.81*  --  0.18   PROTIME Seconds  --   --   --   --   --  11.9   INR   --   --   --   --   --  0.91   TROPONIN T ng/mL  --   --   --  <0.010  --  <0.010    < > = values in this interval not displayed.           Culture Results:    Microbiology Results (last 10 days)     Procedure Component Value - Date/Time    Legionella Antigen, Urine - Urine, Urine, Clean Catch [[id number]]  (Normal) Collected: 01/17/22 0336    Lab Status: Final result Specimen: Urine, Clean Catch Updated: 01/17/22 0523     LEGIONELLA ANTIGEN, URINE Negative    S. Pneumo Ag Urine or CSF - Urine, Urine, Clean Catch [[id number]]  (Normal) Collected: 01/17/22 0336    Lab Status: Final result Specimen: Urine, Clean Catch Updated: 01/17/22 0523     Strep Pneumo Ag Negative    Blood Culture - Blood, Arm, Left [[id number]]  (Normal) Collected: 01/16/22 1523    Lab Status: Preliminary result Specimen: Blood from Arm, Left Updated: 01/17/22 1600     Blood Culture No growth at 24 hours    Blood Culture - Blood, Arm, Left [[id number]]  (Normal) Collected: 01/16/22 1522    Lab Status: Preliminary result Specimen: Blood from Arm, Left Updated: 01/17/22 1600     Blood Culture No  growth at 24 hours    COVID-19,[facility] IN-HOUSE,Nasal Swab No Transport Media 3-4 HR TAT - Swab, Nasal Cavity [[id number]]  (Abnormal) Collected: 01/16/22 1420    Lab Status: Final result Specimen: Swab from Nasal Cavity Updated: 01/16/22 1525     COVID19 Detected    Narrative:      Fact sheet for providers: https://www.fda.gov/media/159429/download     Fact sheet for patients: https://www.fda.gov/media/845400/download    Test performed by PCR.    Consider negative results in combination with clinical observations, patient history, and epidemiological information.               Radiology:           Imaging Results (Last 72 Hours)     Procedure Component Value Units Date/Time    CT Angiogram Chest [[id number]] Collected: 01/16/22 1652     Updated: 01/16/22 1657    Narrative:      EXAMINATION: CT ANGIOGRAM CHEST-      1/16/2022 4:13 PM CST     HISTORY: PE suspected, low/intermediate prob, positive D-dimer;  J96.01-Acute respiratory failure with hypoxia     In order to have a CT radiation dose as low as reasonably achievable  Automated Exposure Control was utilized for adjustment of the mA and/or  KV according to patient size.     DLP in mGycm= 642.     CT angiogram chest.  CT angiography protocol.   CT imaging with bolus IV contrast injection.   Under concurrent supervision axial, sagittal, coronal, and  three-dimensional data sets were constructed.     Comparison is made with January 7, 2022.     Normal heart size.  Normal thoracic aorta.  Symmetric and normally opacified pulmonary arteries.  No pulmonary embolism.     Patchy infiltrate throughout both lungs compatible with COVID pneumonia.     No pneumothorax or pleural effusion.     Summary:  1. No pulmonary embolism.  2. Prominent bilateral pneumonia.                                   This report was finalized on 01/16/2022 16:54 by Dr. Fabio Johnson MD.    XR Chest 1 View [[id number]] Collected: 01/16/22 1516     Updated: 01/16/22 1521    Narrative:       EXAMINATION: Chest 1 view 1/16/2022     HISTORY: Shortness of air     FINDINGS: Upright frontal projection of chest is compared to prior exam  of 1/7/2022. Lungs are hypoventilated causing accentuation of the  bronchovascular markings and cardiac silhouette. There is diffuse  bilateral pneumonia with extensive consolidation within both lungs. No  effusion or free air present.       Impression:      1. Expiratory chest.  2. Diffuse consolidation within both lungs consistent with either  diffuse pneumonia or pulmonary edema.  This report was finalized on 01/16/2022 15:18 by Dr. Nawaf Hernandez MD.          Assessment/Plan     Active Hospital Problems    Diagnosis    • Acute respiratory failure with hypoxia (HCC)    • Pneumonia due to COVID-19 virus    • Down syndrome    • Obesity, morbid, BMI 50 or higher (HCC)        IMPRESSION:  Acute respiratory failure secondary to COVID-19 infection with pneumonia-patient is currently maxed on Vapotherm with nonrebreather.  Dr. Koch has spoken with the patient's dad at least twice today after discussion with family and patient is now no CPR.  Original test was January 7 (day #12 from positive test) and patient had symptoms 3 days prior according to emergency department visit that day.  (Day #15 from symptom onset) patient currently on dexamethasone, remdesivir started January 16 but discontinued January 17 given lack of benefit., tocilizumab given today.     Elevated transaminases-improved today     Elevated CRP-improved     Acute kidney injury -creatinine was 0.9 on 1/12/2021-improved    RECOMMENDATION:   · Continue dexamethasone 6 mg daily  · Encouraged proning, near proning, side to side-discussed with patient's dad who said patient did well with that yesterday.  Also discussed with DOMINGO [place]  · VTE prophylaxis per attending  · Adjuvant therapy per attending  · Continue empiric cefepime for now        Katy Diane MD  01/18/22  09:25 CST

## 2022-01-18 NOTE — PROGRESS NOTES
[facility] Medicine Services  INPATIENT PROGRESS NOTE    Length of Stay: 2  Date of Admission: 1/16/2022  Primary Care Physician: Olimpia Stevens APRN    Subjective   Chief Complaint: COVID-pneumonia.  Hypoxia.  Down syndrome.    HPI   On Vapotherm and nonrebreather above that.  O2 sat been in the low 90s.  Patient is able to tolerate BiPAP.  Dad is in the room with the patient for support.  Patient get very agitated without seeing his dad.  T-max 97.8.  T-current 97.8.  Blood pressure stable.    Review of Systems   Unable to obtain because patient does not talk.  Most information is from the dad.    All pertinent negatives and positives are as above. All other systems have been reviewed and are negative unless otherwise stated.     Objective    Temp:  [97 °F (36.1 °C)-97.8 °F (36.6 °C)] 97.8 °F (36.6 °C)  Heart Rate:  [73-98] 78  Resp:  [16-28] 21  BP: ()/(55-92) 113/92    Intake/Output Summary (Last 24 hours) at 1/18/2022 0917  Last data filed at 1/18/2022 0847  Gross per 24 hour   Intake 1082.7 ml   Output 1000 ml   Net 82.7 ml     Physical Exam  Vitals and nursing note reviewed.   Constitutional:       General: He is not in acute distress.     Appearance: He is well-developed. He is not toxic-appearing.      Comments: Down syndrome features.  Patient does not talk.   HENT:      Head: Normocephalic.      Mouth/Throat:      Mouth: Mucous membranes are moist.      Pharynx: Oropharynx is clear.   Eyes:      General: No scleral icterus.     Extraocular Movements: Extraocular movements intact.      Conjunctiva/sclera: Conjunctivae normal.      Pupils: Pupils are equal, round, and reactive to light.   Neck:      Thyroid: No thyromegaly.      Vascular: No carotid bruit or JVD.      Trachea: No tracheal deviation.   Cardiovascular:      Rate and Rhythm: Normal rate and regular rhythm.      Pulses: Normal pulses.      Heart sounds: No murmur heard.  No friction rub. No  gallop.    Pulmonary:      Effort: No respiratory distress.      Breath sounds: No wheezing, rhonchi or rales.      Comments: Vapotherm/nonrebreather above that.  Diminished breath sound bilateral, clear.  Chest:      Chest wall: No tenderness.   Abdominal:      General: Bowel sounds are normal. There is no distension.      Palpations: Abdomen is soft.      Tenderness: There is no abdominal tenderness.      Comments: Morbid obesity.  BMI is 52.   Musculoskeletal:         General: No swelling or tenderness. Normal range of motion.      Cervical back: Normal range of motion and neck supple. No muscular tenderness.   Skin:     General: Skin is warm and dry.      Capillary Refill: Capillary refill takes 2 to 3 seconds.      Findings: No erythema or rash.   Neurological:      Mental Status: He is alert.      Cranial Nerves: No cranial nerve deficit.      Motor: Weakness present.      Coordination: Coordination abnormal.      Gait: Gait abnormal.   Results Review:  Lab Results (last 24 hours)     Procedure Component Value Units Date/Time    Manual Differential [[id number]]  (Abnormal) Collected: 01/18/22 0310    Specimen: Blood Updated: 01/18/22 0431     Neutrophil % 76.5 %      Lymphocyte % 4.1 %      Monocyte % 13.3 %      Bands %  2.0 %      Atypical Lymphocyte % 3.1 %      Plasma Cells % 1.0 %      Neutrophils Absolute 5.59 10*3/mm3      Lymphocytes Absolute 0.51 10*3/mm3      Monocytes Absolute 0.95 10*3/mm3      nRBC 3.1 /100 WBC      Anisocytosis Slight/1+     Poikilocytes Slight/1+     WBC Morphology Normal     Clumped Platelets Present     Giant Platelets Mod/2+    CBC & Differential [[id number]]  (Abnormal) Collected: 01/18/22 0310    Specimen: Blood Updated: 01/18/22 0431    Narrative:      The following orders were created for panel order CBC & Differential.  Procedure                               Abnormality         Status                     ---------                               -----------         ------                      CBC Auto Differential[[id number]]        Abnormal            Final result                 Please view results for these tests on the individual orders.    CBC Auto Differential [[id number]]  (Abnormal) Collected: 01/18/22 0310    Specimen: Blood Updated: 01/18/22 0431     WBC 7.11 10*3/mm3      RBC 4.28 10*6/mm3      Hemoglobin 12.1 g/dL      Hematocrit 37.4 %      MCV 87.4 fL      MCH 28.3 pg      MCHC 32.4 g/dL      RDW 15.3 %      RDW-SD 49.3 fl      MPV 9.6 fL      Platelets 423 10*3/mm3     Comprehensive Metabolic Panel [[id number]]  (Abnormal) Collected: 01/18/22 0310    Specimen: Blood Updated: 01/18/22 0425     Glucose 130 mg/dL      BUN 34 mg/dL      Creatinine 1.41 mg/dL      Sodium 136 mmol/L      Potassium 4.9 mmol/L      Chloride 99 mmol/L      CO2 23.0 mmol/L      Calcium 8.2 mg/dL      Total Protein 6.6 g/dL      Albumin 2.40 g/dL      ALT (SGPT) 100 U/L      AST (SGOT) 83 U/L      Alkaline Phosphatase 102 U/L      Total Bilirubin 0.3 mg/dL      eGFR Non African Amer 56 mL/min/1.73      Globulin 4.2 gm/dL      A/G Ratio 0.6 g/dL      BUN/Creatinine Ratio 24.1     Anion Gap 14.0 mmol/L     Narrative:      GFR Normal >60  Chronic Kidney Disease <60  Kidney Failure <15      C-reactive Protein [[id number]]  (Abnormal) Collected: 01/18/22 0310    Specimen: Blood Updated: 01/18/22 0425     C-Reactive Protein 6.58 mg/dL     Ferritin [[id number]]  (Abnormal) Collected: 01/18/22 0310    Specimen: Blood Updated: 01/18/22 0425     Ferritin 1,209.00 ng/mL     Narrative:      Results may be falsely decreased if patient taking Biotin.      Blood Culture - Blood, Arm, Left [[id number]]  (Normal) Collected: 01/16/22 1523    Specimen: Blood from Arm, Left Updated: 01/17/22 1600     Blood Culture No growth at 24 hours    Blood Culture - Blood, Arm, Left [[id number]]  (Normal) Collected: 01/16/22 1522    Specimen: Blood from Arm, Left Updated: 01/17/22 1600     Blood Culture No growth at 24 hours            Cultures:  Blood Culture   Date Value Ref Range Status   01/16/2022 No growth at 24 hours  Preliminary   01/16/2022 No growth at 24 hours  Preliminary       Radiology Data:    Imaging Results (Last 24 Hours)     ** No results found for the last 24 hours. **          No Known Allergies    Scheduled meds:   allopurinol, 100 mg, Oral, Daily  ascorbic acid, 500 mg, Oral, Daily  cefepime, 2 g, Intravenous, Q8H  cholecalciferol, 2,000 Units, Oral, Daily  dexamethasone, 6 mg, Oral, Daily   Or  dexamethasone, 6 mg, Intravenous, Daily  enoxaparin, 40 mg, Subcutaneous, Q24H  zinc sulfate, 220 mg, Oral, Daily  ziprasidone, 10 mg, Intramuscular, Once        PRN meds:  •  acetaminophen **OR** acetaminophen  •  albuterol sulfate HFA  •  benzonatate  •  dextromethorphan polistirex ER  •  ipratropium-albuterol  •  Pharmacy Consult - Remdesivir  •  [COMPLETED] Insert peripheral IV **AND** sodium chloride    Assessment/Plan       Acute respiratory failure with hypoxia (HCC)    Pneumonia due to COVID-19 virus    Down syndrome    Obesity, morbid, BMI 50 or higher (HCC)      Plan:  Acute respiratory failure/hypoxia/COVID-pneumonia.  Symptoms of COVID started January 8.  Consult pulmonary for Tocilizumab.  Incentive spirometer . flutter valve.  Vitamin C.  Zinc.  Vitamin D . consult.  Infectious disease.  Cefepime due to elevated procalcitonin.  Consult . Decadron.  Consult pharmacy for remdesivir.  Tocilizumab 1/17/2022.  Chest x-ray-Expiratory chest, Diffuse consolidation within both lungs consistent with either  diffuse pneumonia or pulmonary edema.  CTA of the chest-No pulmonary embolism, prominent bilateral pneumonia.  On Vapotherm 40/100 and a nonrebreather above that.     Hypotension.  Slow IV hydration.     History of Down syndrome.  Echocardiogram-ejection fraction 60%, mild to moderate concentric hypertrophy, diastolic function is normal.    Gout.  Continue allopurinol.     Hyponatremia.  Improving. Normal  saline.     Hyperkalemia.  Resolved.      Renal insufficiency.    Creatinine improving.  Slow IV hydration     Elevated liver enzyme.  Improving liver function.  Acute hepatitis pendin-negative     Lovenox prophylaxis renal protocol     Gross morbid obesity/BMI is 42.     Blood cultures-no growth in 24 hrs.  COVID-19- positive.  Legionella-negative.  Strep pneumo-negative.     Discharge Planning:  3-6 days.  DNR.  DNI.  Prognosis guarded.  Palliative has been consulted.    Electronically signed by Billy Koch MD, 01/18/22, 9:17 AM CST.

## 2022-01-18 NOTE — PROGRESS NOTES
Palliative Care Daily Progress Note   Chief complaint-follow up support for patient/family    Code Status:   Code Status and Medical Interventions:   Ordered at: 01/17/22 1044     Medical Intervention Limits:    NO intubation (DNI)    NO cardioversion     Level Of Support Discussed With:    Patient     Code Status (Patient has no pulse and is not breathing):    No CPR (Do Not Attempt to Resuscitate)     Medical Interventions (Patient has pulse or is breathing):    Limited Support      Advanced Directives: Advance Directive Status: Patient does not have advance directive   Goals of Care: Ongoing.     S: Medical record reviewed. Events noted.  Laying in bed without apparent distress.  Remains on Vapotherm and nonrebreather mask.  Received tocilizumab today.  It echocardiogram revealed EF 60% and mild to moderate hypertrophy.  CRP trending down.  Patient's father remains at bedside and gives a thumbs up.       Review of Systems   Unable to perform ROS: acuity of condition     Pain Assessment  CPOT and PAINAD Scales: CPOT (Critical-Care Pain Observation Tool)  CPOT Facial Expression: 0-->relaxed, neutral  CPOT Body Movements: 0-->absence of movements  CPOT Muscle Tension: 0-->relaxed  Ventilator Compliance/Vocalization: 0-->talking in normal tone or no sound  CPOT Score: 0    O:     Intake/Output Summary (Last 24 hours) at 1/18/2022 1708  Last data filed at 1/18/2022 1317  Gross per 24 hour   Intake 987 ml   Output 1800 ml   Net -813 ml       Diagnostics: Reviewed    Current Facility-Administered Medications   Medication Dose Route Frequency Provider Last Rate Last Admin   • acetaminophen (TYLENOL) tablet 650 mg  650 mg Oral Q4H PRN Billy Koch MD        Or   • acetaminophen (TYLENOL) suppository 650 mg  650 mg Rectal Q4H PRN Billy Koch MD       • albuterol sulfate HFA (PROVENTIL HFA;VENTOLIN HFA;PROAIR HFA) inhaler 2 puff  2 puff Inhalation 4x Daily - RT Winifred العراقي APRN   2 puff at 01/18/22 1550   •  allopurinol (ZYLOPRIM) tablet 100 mg  100 mg Oral Daily Billy Koch MD   100 mg at 01/18/22 0945   • ascorbic acid (VITAMIN C) tablet 500 mg  500 mg Oral Daily Billy Koch MD   500 mg at 01/18/22 0945   • benzonatate (TESSALON) capsule 100 mg  100 mg Oral TID PRN Billy Koch MD   100 mg at 01/17/22 2013   • budesonide-formoterol (SYMBICORT) 160-4.5 MCG/ACT inhaler 2 puff  2 puff Inhalation BID - RT Winifred العراقي APRN   2 puff at 01/18/22 1136   • cefepime (MAXIPIME) 2 g/100 mL 0.9% NS (mbp)  2 g Intravenous Q8H Billy Koch MD   2 g at 01/18/22 1326   • cholecalciferol (VITAMIN D3) tablet 2,000 Units  2,000 Units Oral Daily Billy Koch MD   2,000 Units at 01/18/22 0945   • dexamethasone (DECADRON) tablet 6 mg  6 mg Oral Daily Billy Koch MD   6 mg at 01/18/22 0945    Or   • dexamethasone (DECADRON) injection 6 mg  6 mg Intravenous Daily Billy Koch MD       • dextromethorphan polistirex ER (DELSYM) 30 MG/5ML oral suspension 60 mg  60 mg Oral Q12H PRN Billy Koch MD       • enoxaparin (LOVENOX) syringe 40 mg  40 mg Subcutaneous Q24H Billy Koch MD   40 mg at 01/17/22 2013   • famotidine (PEPCID) tablet 20 mg  20 mg Oral BID AC Winifred العراقي APRN       • guaiFENesin (MUCINEX) 12 hr tablet 600 mg  600 mg Oral Q12H Winifred العراقي APRN   600 mg at 01/18/22 1326   • Pharmacy Consult - Remdesivir   Does not apply Continuous PRN Billy Koch MD       • sodium chloride 0.9 % flush 10 mL  10 mL Intravenous PRN Fercho Collado MD       • tamsulosin (FLOMAX) 24 hr capsule 0.4 mg  0.4 mg Oral Daily Billy Koch MD       • zinc sulfate (ZINCATE) capsule 220 mg  220 mg Oral Daily Billy Koch MD   220 mg at 01/18/22 0945   • ziprasidone (GEODON) injection 10 mg  10 mg Intramuscular Once Maximus Camarena DO         Pharmacy Consult - Remdesivir,       •  acetaminophen **OR** acetaminophen  •  benzonatate  •  dextromethorphan polistirex ER  •  Pharmacy Consult -  "Remdesivir  •  [COMPLETED] Insert peripheral IV **AND** sodium chloride    A:    /67 (BP Location: Right arm, Patient Position: Lying)   Pulse 81   Temp 97.5 °F (36.4 °C) (Axillary)   Resp 26   Ht 152.4 cm (60\")   Wt 124 kg (273 lb 12.8 oz)   SpO2 91%   BMI 53.47 kg/m²     Assessment completed through glass with the assistance of nursing in an effort to prevent exposure, cross-contamination and PPE usage.    Vitals and nursing note reviewed.   Constitutional:       Appearance: Ill-appearing and acutely ill-appearing.   Eyes:      General: Lids are normal.   HENT:      Head: Normocephalic.   Pulmonary:      Comments: Vapotherm 40 L FiO2 100%, nonrebreather mask also in place  Cardiovascular:      Normal rate.   Musculoskeletal:      Cervical back: Neck supple.   Skin:     Comments: No documented skin injuries   Genitourinary:     Comments: Voiding without difficulty  Neurological:      Mental Status: Mental status is at baseline.   Psychiatric:      Comments: Down syndrome      Patient status: Disease state: Controlled with current treatments.  Functional status: Palliative Performance Scale Score: Performance 40% based on the following measures: Ambulation: Mainly in bed, Activity and Evidence of Disease: Unable to do any work, extensive evidence of disease, Self-Care: Mainly assistance required,  Intake: Normal or reduced, LOC: Full, drowsy or confusion   Nutritional status: albumin 2.40. Body mass index is 52.34 kg/m².      Family support: The patient receives support from his father..  Advance Directives: no advance directive of file     POA/Healthcare surrogate-Kevin samayoa-next of kin     Impression/Problem List:     1.  Acute respiratory failure with hypoxia  2.  Pneumonia due to COVID-19 virus  3.  Morbid obesity  4.  Down syndrome  5.  Transaminitis     Recommendations/Plan:  1. plan: Goals of care include CODE STATUS No CPR/Limited support interventions.     2.  Palliative care " encounter  -guarded prognosis secondary to acute respiratory failure with hypoxia due to COVID-19 pneumonia, high rate of oxygenation needs as patient is currently maxed on Vapotherm and has a nonrebreather mask in place, and multiple co-morbidities.    -Plans to continue current aggressive interventions      1/17-Explored concerns including limited mobility and addressed oxygenation concerns with mobility. Patient's father feels that mobility and ability to get up in chair would be helpful for patient. These wishes and concerns have been verbalized to nursing, but again stressed the limited ability to mobilize due to hypoxia. Father obviously have difficulties coming to terms with patient's current state of health, but appreciative for conversation and ability to be at bedside.      -Discussed in the event of further respiratory decline may consider comfort based-care at that juncture.        Thank you for allowing us to participate in patient's plan of care. Palliative Care Team will continue to follow patient.     Time spent: 30 minutes spent reviewing medical and medication records, assessing and examining patient, discussing with family, answering questions, providing some guidance about a plan and documentation of care, and coordinating care with other healthcare members, with > 50% time spent face to face.     Rafaela Sanders, APRN  1/18/2022

## 2022-01-19 LAB
ALBUMIN SERPL-MCNC: 2.4 G/DL (ref 3.5–5.2)
ALBUMIN/GLOB SERPL: 0.7 G/DL
ALP SERPL-CCNC: 92 U/L (ref 39–117)
ALT SERPL W P-5'-P-CCNC: 83 U/L (ref 1–41)
ANION GAP SERPL CALCULATED.3IONS-SCNC: 7 MMOL/L (ref 5–15)
AST SERPL-CCNC: 62 U/L (ref 1–40)
B PARAPERT DNA SPEC QL NAA+PROBE: NOT DETECTED
B PERT DNA SPEC QL NAA+PROBE: NOT DETECTED
BASOPHILS # BLD AUTO: 0.01 10*3/MM3 (ref 0–0.2)
BASOPHILS NFR BLD AUTO: 0.1 % (ref 0–1.5)
BILIRUB SERPL-MCNC: 0.3 MG/DL (ref 0–1.2)
BUN SERPL-MCNC: 34 MG/DL (ref 6–20)
BUN/CREAT SERPL: 26.2 (ref 7–25)
C PNEUM DNA NPH QL NAA+NON-PROBE: NOT DETECTED
CALCIUM SPEC-SCNC: 8.1 MG/DL (ref 8.6–10.5)
CHLORIDE SERPL-SCNC: 101 MMOL/L (ref 98–107)
CO2 SERPL-SCNC: 27 MMOL/L (ref 22–29)
CREAT SERPL-MCNC: 1.3 MG/DL (ref 0.76–1.27)
CRP SERPL-MCNC: 2.21 MG/DL (ref 0–0.5)
DEPRECATED RDW RBC AUTO: 48.4 FL (ref 37–54)
EOSINOPHIL # BLD AUTO: 0 10*3/MM3 (ref 0–0.4)
EOSINOPHIL NFR BLD AUTO: 0 % (ref 0.3–6.2)
ERYTHROCYTE [DISTWIDTH] IN BLOOD BY AUTOMATED COUNT: 15 % (ref 12.3–15.4)
FERRITIN SERPL-MCNC: 933.9 NG/ML (ref 30–400)
FLUAV SUBTYP SPEC NAA+PROBE: NOT DETECTED
FLUBV RNA ISLT QL NAA+PROBE: NOT DETECTED
GFR SERPL CREATININE-BSD FRML MDRD: 61 ML/MIN/1.73
GLOBULIN UR ELPH-MCNC: 3.5 GM/DL
GLUCOSE SERPL-MCNC: 122 MG/DL (ref 65–99)
HADV DNA SPEC NAA+PROBE: NOT DETECTED
HCOV 229E RNA SPEC QL NAA+PROBE: NOT DETECTED
HCOV HKU1 RNA SPEC QL NAA+PROBE: NOT DETECTED
HCOV NL63 RNA SPEC QL NAA+PROBE: NOT DETECTED
HCOV OC43 RNA SPEC QL NAA+PROBE: NOT DETECTED
HCT VFR BLD AUTO: 36.1 % (ref 37.5–51)
HGB BLD-MCNC: 11.8 G/DL (ref 13–17.7)
HMPV RNA NPH QL NAA+NON-PROBE: NOT DETECTED
HPIV1 RNA ISLT QL NAA+PROBE: NOT DETECTED
HPIV2 RNA SPEC QL NAA+PROBE: NOT DETECTED
HPIV3 RNA NPH QL NAA+PROBE: NOT DETECTED
HPIV4 P GENE NPH QL NAA+PROBE: NOT DETECTED
IMM GRANULOCYTES # BLD AUTO: 0.09 10*3/MM3 (ref 0–0.05)
IMM GRANULOCYTES NFR BLD AUTO: 1.2 % (ref 0–0.5)
LYMPHOCYTES # BLD AUTO: 0.83 10*3/MM3 (ref 0.7–3.1)
LYMPHOCYTES NFR BLD AUTO: 10.8 % (ref 19.6–45.3)
M PNEUMO IGG SER IA-ACNC: NOT DETECTED
MCH RBC QN AUTO: 28.5 PG (ref 26.6–33)
MCHC RBC AUTO-ENTMCNC: 32.7 G/DL (ref 31.5–35.7)
MCV RBC AUTO: 87.2 FL (ref 79–97)
MONOCYTES # BLD AUTO: 0.77 10*3/MM3 (ref 0.1–0.9)
MONOCYTES NFR BLD AUTO: 10 % (ref 5–12)
NEUTROPHILS NFR BLD AUTO: 6.01 10*3/MM3 (ref 1.7–7)
NEUTROPHILS NFR BLD AUTO: 77.9 % (ref 42.7–76)
NRBC BLD AUTO-RTO: 0.4 /100 WBC (ref 0–0.2)
PLATELET # BLD AUTO: 401 10*3/MM3 (ref 140–450)
PMV BLD AUTO: 9.3 FL (ref 6–12)
POTASSIUM SERPL-SCNC: 5.4 MMOL/L (ref 3.5–5.2)
PROCALCITONIN SERPL-MCNC: 0.39 NG/ML (ref 0–0.25)
PROT SERPL-MCNC: 5.9 G/DL (ref 6–8.5)
RBC # BLD AUTO: 4.14 10*6/MM3 (ref 4.14–5.8)
RHINOVIRUS RNA SPEC NAA+PROBE: NOT DETECTED
RSV RNA NPH QL NAA+NON-PROBE: NOT DETECTED
SARS-COV-2 RNA NPH QL NAA+NON-PROBE: DETECTED
SODIUM SERPL-SCNC: 135 MMOL/L (ref 136–145)
WBC NRBC COR # BLD: 7.71 10*3/MM3 (ref 3.4–10.8)

## 2022-01-19 PROCEDURE — 99232 SBSQ HOSP IP/OBS MODERATE 35: CPT | Performed by: CLINICAL NURSE SPECIALIST

## 2022-01-19 PROCEDURE — 36415 COLL VENOUS BLD VENIPUNCTURE: CPT | Performed by: FAMILY MEDICINE

## 2022-01-19 PROCEDURE — 25010000002 DEXAMETHASONE PER 1 MG: Performed by: FAMILY MEDICINE

## 2022-01-19 PROCEDURE — 94799 UNLISTED PULMONARY SVC/PX: CPT

## 2022-01-19 PROCEDURE — 86140 C-REACTIVE PROTEIN: CPT | Performed by: FAMILY MEDICINE

## 2022-01-19 PROCEDURE — 85025 COMPLETE CBC W/AUTO DIFF WBC: CPT | Performed by: FAMILY MEDICINE

## 2022-01-19 PROCEDURE — 0202U NFCT DS 22 TRGT SARS-COV-2: CPT | Performed by: FAMILY MEDICINE

## 2022-01-19 PROCEDURE — 25010000002 CEFEPIME PER 500 MG: Performed by: FAMILY MEDICINE

## 2022-01-19 PROCEDURE — 84145 PROCALCITONIN (PCT): CPT | Performed by: FAMILY MEDICINE

## 2022-01-19 PROCEDURE — 25010000002 ENOXAPARIN PER 10 MG: Performed by: FAMILY MEDICINE

## 2022-01-19 PROCEDURE — 80053 COMPREHEN METABOLIC PANEL: CPT | Performed by: FAMILY MEDICINE

## 2022-01-19 PROCEDURE — 99233 SBSQ HOSP IP/OBS HIGH 50: CPT | Performed by: INTERNAL MEDICINE

## 2022-01-19 PROCEDURE — 82728 ASSAY OF FERRITIN: CPT | Performed by: FAMILY MEDICINE

## 2022-01-19 RX ORDER — BENZONATATE 100 MG/1
100 CAPSULE ORAL 3 TIMES DAILY PRN
Status: ON HOLD | COMMUNITY
End: 2022-01-20

## 2022-01-19 RX ORDER — ONDANSETRON 4 MG/1
4 TABLET, FILM COATED ORAL EVERY 8 HOURS PRN
Status: ON HOLD | COMMUNITY
End: 2022-01-20

## 2022-01-19 RX ADMIN — CEFEPIME 2 G: 2 INJECTION, POWDER, FOR SOLUTION INTRAVENOUS at 05:37

## 2022-01-19 RX ADMIN — ALBUTEROL SULFATE 2 PUFF: 90 AEROSOL, METERED RESPIRATORY (INHALATION) at 07:15

## 2022-01-19 RX ADMIN — BUDESONIDE AND FORMOTEROL FUMARATE DIHYDRATE 2 PUFF: 160; 4.5 AEROSOL RESPIRATORY (INHALATION) at 07:15

## 2022-01-19 RX ADMIN — ALLOPURINOL 100 MG: 100 TABLET ORAL at 08:21

## 2022-01-19 RX ADMIN — BUDESONIDE AND FORMOTEROL FUMARATE DIHYDRATE 2 PUFF: 160; 4.5 AEROSOL RESPIRATORY (INHALATION) at 19:06

## 2022-01-19 RX ADMIN — FAMOTIDINE 20 MG: 20 TABLET, FILM COATED ORAL at 16:57

## 2022-01-19 RX ADMIN — TAMSULOSIN HYDROCHLORIDE 0.4 MG: 0.4 CAPSULE ORAL at 08:23

## 2022-01-19 RX ADMIN — ALBUTEROL SULFATE 2 PUFF: 90 AEROSOL, METERED RESPIRATORY (INHALATION) at 15:39

## 2022-01-19 RX ADMIN — Medication 2000 UNITS: at 08:20

## 2022-01-19 RX ADMIN — GUAIFENESIN 600 MG: 600 TABLET, EXTENDED RELEASE ORAL at 20:03

## 2022-01-19 RX ADMIN — SODIUM ZIRCONIUM CYCLOSILICATE 10 G: 10 POWDER, FOR SUSPENSION ORAL at 10:14

## 2022-01-19 RX ADMIN — SODIUM ZIRCONIUM CYCLOSILICATE 10 G: 10 POWDER, FOR SUSPENSION ORAL at 20:03

## 2022-01-19 RX ADMIN — ZINC SULFATE 220 MG (50 MG) CAPSULE 220 MG: CAPSULE at 08:23

## 2022-01-19 RX ADMIN — ACETAMINOPHEN 650 MG: 325 TABLET ORAL at 00:22

## 2022-01-19 RX ADMIN — ENOXAPARIN SODIUM 40 MG: 40 INJECTION SUBCUTANEOUS at 20:03

## 2022-01-19 RX ADMIN — GUAIFENESIN 600 MG: 600 TABLET, EXTENDED RELEASE ORAL at 08:22

## 2022-01-19 RX ADMIN — DEXAMETHASONE SODIUM PHOSPHATE 6 MG: 4 INJECTION, SOLUTION INTRA-ARTICULAR; INTRALESIONAL; INTRAMUSCULAR; INTRAVENOUS; SOFT TISSUE at 08:24

## 2022-01-19 RX ADMIN — OXYCODONE HYDROCHLORIDE AND ACETAMINOPHEN 500 MG: 500 TABLET ORAL at 08:22

## 2022-01-19 RX ADMIN — ALBUTEROL SULFATE 2 PUFF: 90 AEROSOL, METERED RESPIRATORY (INHALATION) at 19:04

## 2022-01-19 RX ADMIN — CEFEPIME 2 G: 2 INJECTION, POWDER, FOR SOLUTION INTRAVENOUS at 13:37

## 2022-01-19 RX ADMIN — FAMOTIDINE 20 MG: 20 TABLET, FILM COATED ORAL at 08:23

## 2022-01-19 RX ADMIN — CEFEPIME 2 G: 2 INJECTION, POWDER, FOR SOLUTION INTRAVENOUS at 21:22

## 2022-01-19 RX ADMIN — ALBUTEROL SULFATE 2 PUFF: 90 AEROSOL, METERED RESPIRATORY (INHALATION) at 11:53

## 2022-01-19 NOTE — PROGRESS NOTES
[facility] Medicine Services  INPATIENT PROGRESS NOTE    Length of Stay: 3  Date of Admission: 1/16/2022  Primary Care Physician: Olimpia Stevens APRN    Subjective   Chief Complaint: COVID-pneumonia.  Hypoxia.  Down syndrome.    HPI   Patient is on Vapotherm with nonrebreather above that.  Satting 100%.  Denies any room given a lot of support.  Patient chandler afebrile.  Blood pressure has been stable.    Review of Systems   Unable to obtain because patient does not talk.  Most information is from the dad.    All pertinent negatives and positives are as above. All other systems have been reviewed and are negative unless otherwise stated.     Objective    Temp:  [96.9 °F (36.1 °C)-97.6 °F (36.4 °C)] 97.3 °F (36.3 °C)  Heart Rate:  [] 72  Resp:  [19-28] 21  BP: ()/() 112/73    Intake/Output Summary (Last 24 hours) at 1/19/2022 0938  Last data filed at 1/19/2022 0400  Gross per 24 hour   Intake 412.72 ml   Output 1800 ml   Net -1387.28 ml     Physical Exam  Vitals and nursing note reviewed.   Constitutional:       General: He is not in acute distress.     Appearance: He is well-developed. He is not toxic-appearing.      Comments: Down syndrome features.  Patient does not talk.   HENT:      Head: Normocephalic.      Mouth/Throat:      Mouth: Mucous membranes are moist.      Pharynx: Oropharynx is clear.   Eyes:      General: No scleral icterus.     Extraocular Movements: Extraocular movements intact.      Conjunctiva/sclera: Conjunctivae normal.      Pupils: Pupils are equal, round, and reactive to light.   Neck:      Thyroid: No thyromegaly.      Vascular: No carotid bruit or JVD.      Trachea: No tracheal deviation.   Cardiovascular:      Rate and Rhythm: Normal rate and regular rhythm.      Pulses: Normal pulses.      Heart sounds: No murmur heard.  No friction rub. No gallop.    Pulmonary:      Effort: No respiratory distress.      Breath sounds: No  wheezing, rhonchi or rales.      Comments: Vapotherm/nonrebreather above that.  Diminished breath sound bilateral, clear.  Chest:      Chest wall: No tenderness.   Abdominal:      General: Bowel sounds are normal. There is no distension.      Palpations: Abdomen is soft.      Tenderness: There is no abdominal tenderness.      Comments: Morbid obesity.  BMI is 52.   Musculoskeletal:         General: No swelling or tenderness. Normal range of motion.      Cervical back: Normal range of motion and neck supple. No muscular tenderness.   Skin:     General: Skin is warm and dry.      Capillary Refill: Capillary refill takes 2 to 3 seconds.      Findings: No erythema or rash.   Neurological:      Mental Status: He is alert.      Cranial Nerves: No cranial nerve deficit.      Motor: Weakness present.      Coordination: Coordination abnormal.      Gait: Gait abnormal.   Results Review:  Lab Results (last 24 hours)     Procedure Component Value Units Date/Time    Comprehensive Metabolic Panel [[id number]]  (Abnormal) Collected: 01/19/22 0406    Specimen: Blood Updated: 01/19/22 0448     Glucose 122 mg/dL      BUN 34 mg/dL      Creatinine 1.30 mg/dL      Sodium 135 mmol/L      Potassium 5.4 mmol/L      Chloride 101 mmol/L      CO2 27.0 mmol/L      Calcium 8.1 mg/dL      Total Protein 5.9 g/dL      Albumin 2.40 g/dL      ALT (SGPT) 83 U/L      AST (SGOT) 62 U/L      Alkaline Phosphatase 92 U/L      Total Bilirubin 0.3 mg/dL      eGFR Non African Amer 61 mL/min/1.73      Globulin 3.5 gm/dL      A/G Ratio 0.7 g/dL      BUN/Creatinine Ratio 26.2     Anion Gap 7.0 mmol/L     Narrative:      GFR Normal >60  Chronic Kidney Disease <60  Kidney Failure <15      C-reactive Protein [[id number]]  (Abnormal) Collected: 01/19/22 0406    Specimen: Blood Updated: 01/19/22 0448     C-Reactive Protein 2.21 mg/dL     Procalcitonin [[id number]]  (Abnormal) Collected: 01/19/22 0406    Specimen: Blood Updated: 01/19/22 0448     Procalcitonin 0.39  "ng/mL     Narrative:      As a Marker for Sepsis (Non-Neonates):     1. <0.5 ng/mL represents a low risk of severe sepsis and/or septic shock.  2. >2 ng/mL represents a high risk of severe sepsis and/or septic shock.    As a Marker for Lower Respiratory Tract Infections that require antibiotic therapy:  PCT on Admission     Antibiotic Therapy             6-12 Hrs later  >0.5                          Strongly Recommended            >0.25 - <0.5             Recommended  0.1 - 0.25                  Discouraged                       Remeasure/reassess PCT  <0.1                         Strongly Discouraged         Remeasure/reassess PCT      As 28 day mortality risk marker: \"Change in Procalcitonin Result\" (>80% or <=80%) if Day 0 (or Day 1) and Day 4 values are available. Refer to http://www.[employer]pct-calculator.com/    Change in PCT <=80 %   A decrease of PCT levels below or equal to 80% defines a positive change in PCT test result representing a higher risk for 28-day all-cause mortality of patients diagnosed with severe sepsis or septic shock.    Change in PCT >80 %   A decrease of PCT levels of more than 80% defines a negative change in PCT result representing a lower risk for 28-day all-cause mortality of patients diagnosed with severe sepsis or septic shock.                Ferritin [[id number]]  (Abnormal) Collected: 01/19/22 0406    Specimen: Blood Updated: 01/19/22 0448     Ferritin 933.90 ng/mL     Narrative:      Results may be falsely decreased if patient taking Biotin.      CBC & Differential [[id number]]  (Abnormal) Collected: 01/19/22 0406    Specimen: Blood Updated: 01/19/22 0423    Narrative:      The following orders were created for panel order CBC & Differential.  Procedure                               Abnormality         Status                     ---------                               -----------         ------                     CBC Auto Differential[[id number]]        Abnormal            Final " result                 Please view results for these tests on the individual orders.    CBC Auto Differential [[id number]]  (Abnormal) Collected: 01/19/22 0406    Specimen: Blood Updated: 01/19/22 0423     WBC 7.71 10*3/mm3      RBC 4.14 10*6/mm3      Hemoglobin 11.8 g/dL      Hematocrit 36.1 %      MCV 87.2 fL      MCH 28.5 pg      MCHC 32.7 g/dL      RDW 15.0 %      RDW-SD 48.4 fl      MPV 9.3 fL      Platelets 401 10*3/mm3      Neutrophil % 77.9 %      Lymphocyte % 10.8 %      Monocyte % 10.0 %      Eosinophil % 0.0 %      Basophil % 0.1 %      Immature Grans % 1.2 %      Neutrophils, Absolute 6.01 10*3/mm3      Lymphocytes, Absolute 0.83 10*3/mm3      Monocytes, Absolute 0.77 10*3/mm3      Eosinophils, Absolute 0.00 10*3/mm3      Basophils, Absolute 0.01 10*3/mm3      Immature Grans, Absolute 0.09 10*3/mm3      nRBC 0.4 /100 WBC     Blood Culture - Blood, Arm, Left [[id number]]  (Normal) Collected: 01/16/22 1522    Specimen: Blood from Arm, Left Updated: 01/18/22 1600     Blood Culture No growth at 2 days    Blood Culture - Blood, Arm, Left [[id number]]  (Normal) Collected: 01/16/22 1523    Specimen: Blood from Arm, Left Updated: 01/18/22 1600     Blood Culture No growth at 2 days           Cultures:  Blood Culture   Date Value Ref Range Status   01/16/2022 No growth at 2 days  Preliminary   01/16/2022 No growth at 2 days  Preliminary       Radiology Data:    Imaging Results (Last 24 Hours)     ** No results found for the last 24 hours. **          No Known Allergies    Scheduled meds:   albuterol sulfate HFA, 2 puff, Inhalation, 4x Daily - RT  allopurinol, 100 mg, Oral, Daily  ascorbic acid, 500 mg, Oral, Daily  budesonide-formoterol, 2 puff, Inhalation, BID - RT  cefepime, 2 g, Intravenous, Q8H  cholecalciferol, 2,000 Units, Oral, Daily  dexamethasone, 6 mg, Oral, Daily   Or  dexamethasone, 6 mg, Intravenous, Daily  enoxaparin, 40 mg, Subcutaneous, Q24H  famotidine, 20 mg, Oral, BID AC  guaiFENesin, 600 mg, Oral,  Q12H  tamsulosin, 0.4 mg, Oral, Daily  zinc sulfate, 220 mg, Oral, Daily  ziprasidone, 10 mg, Intramuscular, Once        PRN meds:  •  acetaminophen **OR** acetaminophen  •  benzonatate  •  dextromethorphan polistirex ER  •  [COMPLETED] Insert peripheral IV **AND** sodium chloride    Assessment/Plan       Acute respiratory failure with hypoxia (HCC)    Pneumonia due to COVID-19 virus    Down syndrome    Obesity, morbid, BMI 50 or higher (HCC)      Plan:  Acute respiratory failure/hypoxia/COVID-pneumonia.  Symptoms of COVID started January 8.  Consult pulmonary for Tocilizumab.  Incentive spirometer . flutter valve.  Vitamin C.  Zinc.  Vitamin D . consult.  Infectious disease.  Cefepime due to elevated procalcitonin.  Consult . Decadron.  Consult pharmacy for remdesivir.  Tocilizumab 1/17/2022.  Chest x-ray-Expiratory chest, Diffuse consolidation within both lungs consistent with either  diffuse pneumonia or pulmonary edema.  CTA of the chest-No pulmonary embolism, prominent bilateral pneumonia.  On Vapotherm 40/100 and a nonrebreather above that.     Hypotension.  Slow IV hydration.     History of Down syndrome.  Echocardiogram-ejection fraction 60%, mild to moderate concentric hypertrophy, diastolic function is normal.     Gout.  Continue allopurinol.     Hyponatremia.  Improving. Normal saline.     Hyperkalemia.   Start back on Lokelma.     Renal insufficiency.    Creatinine improving.  Slow IV hydration     Elevated liver enzyme.  Improving liver function.  Acute hepatitis pendin-negative     Lovenox prophylaxis renal protocol     Gross morbid obesity/BMI is 42.     Blood cultures-no growth in 2 days.  COVID-19- positive.  Legionella-negative.  Strep pneumo-negative.     Discharge Planning:  3-6 days.  DNR.  DNI.  Prognosis guarded.  Palliative has been consulted.    Electronically signed by Billy Koch MD, 01/19/22, 9:38 AM CST.

## 2022-01-19 NOTE — PROGRESS NOTES
PULMONARY AND CRITICAL CARE PROGRESS NOTE - [facility]    Patient: Kevin Linder  1980   MR# [id number]   Acct# [id number]  01/19/22   08:38 CST  Referring Provider: Billy Koch MD    Chief Complaint: Covid-19     Interval history: He remains in COVID isolation.  He is afebrile.  CRP improving.  Last chest x-ray on the 16th.  Labs stable.  His father remains at the bedside.  He indicates he rested well.  He has not had any worsening breathing difficulties.  Current saturation 96 on 40 L, FiO2 1.0 and nonrebreather.  Respiratory rate 16.  He now has a Tadeo catheter in place.    Cefepime D#4.  He is on Mucinex for mucus clearing.  He is on albuterol and Symbicort for bronchodilation.  He is on Pepcid while receiving steroids.  He is dexamethasone D#4.  DVT prophylaxis, Lovenox 40 daily.  Continue to encourage proning, nutrition, hydration immobilization.  Palliative care following.    Meds:  albuterol sulfate HFA, 2 puff, Inhalation, 4x Daily - RT  allopurinol, 100 mg, Oral, Daily  ascorbic acid, 500 mg, Oral, Daily  budesonide-formoterol, 2 puff, Inhalation, BID - RT  cefepime, 2 g, Intravenous, Q8H  cholecalciferol, 2,000 Units, Oral, Daily  dexamethasone, 6 mg, Oral, Daily   Or  dexamethasone, 6 mg, Intravenous, Daily  enoxaparin, 40 mg, Subcutaneous, Q24H  famotidine, 20 mg, Oral, BID AC  guaiFENesin, 600 mg, Oral, Q12H  tamsulosin, 0.4 mg, Oral, Daily  zinc sulfate, 220 mg, Oral, Daily  ziprasidone, 10 mg, Intramuscular, Once      Pharmacy Consult - Remdesivir,       Review of Systems:   Review of Systems   Unable to perform ROS: Other   Patient has Down syndrome    Physical Exam:  SpO2 Percentage    01/19/22 0400 01/19/22 0500 01/19/22 0600   SpO2: 100% 99% 97%     Temp:  [96.9 °F (36.1 °C)-97.6 °F (36.4 °C)] 97.3 °F (36.3 °C)  Heart Rate:  [] 73  Resp:  [19-28] 21  BP: ()/() 105/72    Intake/Output Summary (Last 24 hours) at 1/19/2022 0811  Last data filed at  1/19/2022 0400  Gross per 24 hour   Intake 643.52 ml   Output 1800 ml   Net -1156.48 ml     Body mass index is 53.47 kg/m².     GENERAL/CONSTITUTIONAL: No distress.  Down syndrome features  HEENT: atraumatic, normocephalic. Extraocular movements normal  NOSE: normal, 40 L and FiO2 1.0 on Vapotherm, NRB  NECK: jugular veins nondistended  CHEST: no paradox, no retractions.  No respiratory distress.   CARDIAC: Sinus rhythm, rate 73  ABDOMEN: nondistended, obese  :  Tadeo  EXTREMITIES: No edema.  NEURO: Awake, alert, calm, sitting up  PSYCH: no agitation  SKIN: no jaundice.  No rash    Electronically signed by CHANDLER Fuentes, 1/19/2022, 08:38 CST        Physician Substantive Portion: Medical Decision Making    Laboratory Data:  Results from last 7 days   Lab Units 01/19/22  0406 01/18/22  0310 01/17/22  0323   WBC 10*3/mm3 7.71 7.11 4.16   HEMOGLOBIN g/dL 11.8* 12.1* 12.4*   PLATELETS 10*3/mm3 401 423 379     Results from last 7 days   Lab Units 01/19/22  0406 01/18/22  0310 01/17/22  0323   SODIUM mmol/L 135* 136 134*   POTASSIUM mmol/L 5.4* 4.9 5.3*   BUN mg/dL 34* 34* 25*   CREATININE mg/dL 1.30* 1.41* 1.70*     Results from last 7 days   Lab Units 01/16/22  1416   PH, ARTERIAL pH units 7.440   PCO2, ARTERIAL mm Hg 37.0   PO2 ART mm Hg 50.2*   FIO2 % 100     Blood Culture   Date Value Ref Range Status   01/16/2022 No growth at 2 days  Preliminary   01/16/2022 No growth at 2 days  Preliminary     Results from last 7 days   Lab Units 01/19/22  0406 01/18/22  0310 01/17/22  0323 01/17/22  0323 01/16/22  1522 01/16/22  1522   CRP mg/dL 2.21* 6.58*   < > 16.62*   < > 15.83*   PROCALCITONIN ng/mL 0.39*  --   --  0.81*   < > 0.81*   FERRITIN ng/mL 933.90* 1,209.00*  --   --    < > 1,432.00*   D DIMER QUANT mg/L (FEU)  --   --   --   --   --  1.44*    < > = values in this interval not displayed.      Recent films:  No radiology results for the last day     My radiograph interpretation/independent review of imaging:  Reviewed and agree with current interpretation.  Pulmonary Assessment:     1. Acute hypoxic respiratory failure  2. SARS-COV2 bilateral viral pneumonia  3. Down's syndrome  4. Morbid obesity  5. Acute respiratory distress syndrome with increased oxygen demand  6. Patient is DNR status.     Recommend/plan:   · Patient is stable from respiratory standpoint and is currently on Vapotherm 40 L 100% FiO2 and also requiring nonrebreather from time to time.  We will try to wean off the nonrebreather and continue on the Vapotherm and transition to regular oxygen if possible.  · Father was present in the room.  Patient was seen by palliative care and he is a DNR now.  · He appears better and comfortable and did not have any acute distress this morning.  · Continue current treatment plan for COVID with dexamethasone day # 4.    · He is on remdesivir and also on adjunct treatment for COVID-19.  · Continue routine respiratory care and pulmonary toilet.  Patient is unable to do much incentive spirometry.  · He has morbid obesity and obstructive sleep apnea and will benefit from BiPAP but he is unable to tolerate BiPAP.  · Continue DVT prophylaxis with Lovenox and stress ulcer prophylaxis  · Self proning preferable but due to his mental status he may not be able to do it.  · Continue nutritional support.  Repeat labs and imaging studies and monitor inflammatory markers.  · Infectious disease team following  · CODE STATUS: Patient is DNR/Perative care following.  · Overall prognosis: Guarded.  · Discussed care plan with Dr. Koch  · We will follow.      This visit was performed by both a physician and an [facility] RN.  I personally evaluated and examined the patient.  I performed all aspects of the medical decision making as documented.    Electronically signed by     Monae Rebollar MD,  Pulmonologist/intensivist   1/19/2022, 12:18 CST

## 2022-01-19 NOTE — PROGRESS NOTES
Palliative Care Daily Progress Note   Chief complaint-follow up support for patient/family    Code Status:   Code Status and Medical Interventions:   Ordered at: 01/17/22 1044     Medical Intervention Limits:    NO intubation (DNI)    NO cardioversion     Level Of Support Discussed With:    Patient     Code Status (Patient has no pulse and is not breathing):    No CPR (Do Not Attempt to Resuscitate)     Medical Interventions (Patient has pulse or is breathing):    Limited Support      Advanced Directives: Advance Directive Status: Patient does not have advance directive   Goals of Care: Ongoing.     S: Medical record reviewed. Events noted.  Laying in bed without apparent distress.  Remains on Vapotherm and nonrebreather mask.  Plans per nursing to trial discontinuation of nonrebreather.  Received tocilizumab yesterday.  Patient's father remains at bedside and remains optimistic.  Denies any needs.  States that his daughter is bringing him a change of clothes up today.     Review of Systems   Unable to perform ROS: acuity of condition     Pain Assessment  CPOT and PAINAD Scales: CPOT (Critical-Care Pain Observation Tool)  CPOT Facial Expression: 0-->relaxed, neutral  CPOT Body Movements: 0-->absence of movements  CPOT Muscle Tension: 0-->relaxed  Ventilator Compliance/Vocalization: 0-->talking in normal tone or no sound  CPOT Score: 0    O:     Intake/Output Summary (Last 24 hours) at 1/19/2022 1251  Last data filed at 1/19/2022 1000  Gross per 24 hour   Intake 423.62 ml   Output 1800 ml   Net -1376.38 ml       Diagnostics: Reviewed    Current Facility-Administered Medications   Medication Dose Route Frequency Provider Last Rate Last Admin   • acetaminophen (TYLENOL) tablet 650 mg  650 mg Oral Q4H PRN Billy Koch MD   650 mg at 01/19/22 0022    Or   • acetaminophen (TYLENOL) suppository 650 mg  650 mg Rectal Q4H PRN Billy Koch MD       • albuterol sulfate HFA (PROVENTIL HFA;VENTOLIN HFA;PROAIR HFA) inhaler  2 puff  2 puff Inhalation 4x Daily - RT Billy Koch MD   2 puff at 01/19/22 1153   • allopurinol (ZYLOPRIM) tablet 100 mg  100 mg Oral Daily Billy Koch MD   100 mg at 01/19/22 0821   • ascorbic acid (VITAMIN C) tablet 500 mg  500 mg Oral Daily Billy Koch MD   500 mg at 01/19/22 0822   • benzonatate (TESSALON) capsule 100 mg  100 mg Oral TID PRN Billy Koch MD   100 mg at 01/18/22 2023   • budesonide-formoterol (SYMBICORT) 160-4.5 MCG/ACT inhaler 2 puff  2 puff Inhalation BID - RT Billy Koch MD   2 puff at 01/19/22 0715   • cefepime (MAXIPIME) 2 g/100 mL 0.9% NS (mbp)  2 g Intravenous Q8H Billy Koch MD   2 g at 01/19/22 0537   • cholecalciferol (VITAMIN D3) tablet 2,000 Units  2,000 Units Oral Daily Billy Koch MD   2,000 Units at 01/19/22 0820   • dexamethasone (DECADRON) tablet 6 mg  6 mg Oral Daily Billy Koch MD   6 mg at 01/18/22 0945    Or   • dexamethasone (DECADRON) injection 6 mg  6 mg Intravenous Daily Billy Koch MD   6 mg at 01/19/22 0824   • dextromethorphan polistirex ER (DELSYM) 30 MG/5ML oral suspension 60 mg  60 mg Oral Q12H PRN Billy Koch MD       • enoxaparin (LOVENOX) syringe 40 mg  40 mg Subcutaneous Q24H Billy Koch MD   40 mg at 01/18/22 2023   • famotidine (PEPCID) tablet 20 mg  20 mg Oral BID AC Billy Koch MD   20 mg at 01/19/22 0823   • guaiFENesin (MUCINEX) 12 hr tablet 600 mg  600 mg Oral Q12H Billy Koch MD   600 mg at 01/19/22 0822   • sodium chloride 0.9 % flush 10 mL  10 mL Intravenous PRN Billy Koch MD       • sodium zirconium cyclosilicate (LOKELMA) pack 10 g  10 g Oral BID Billy Koch MD   10 g at 01/19/22 1014   • tamsulosin (FLOMAX) 24 hr capsule 0.4 mg  0.4 mg Oral Daily Billy Koch MD   0.4 mg at 01/19/22 0823   • zinc sulfate (ZINCATE) capsule 220 mg  220 mg Oral Daily Billy Koch MD   220 mg at 01/19/22 0823   • ziprasidone (GEODON) injection 10 mg  10 mg Intramuscular Once Billy Koch MD           "  •  acetaminophen **OR** acetaminophen  •  benzonatate  •  dextromethorphan polistirex ER  •  [COMPLETED] Insert peripheral IV **AND** sodium chloride    A:    /75 (BP Location: Right arm, Patient Position: Lying)   Pulse 72   Temp 97.3 °F (36.3 °C) (Axillary)   Resp 20   Ht 152.4 cm (60\")   Wt 124 kg (273 lb 12.8 oz)   SpO2 100%   BMI 53.47 kg/m²     Vitals and nursing note reviewed.   Constitutional:       Appearance: Ill-appearing and acutely ill-appearing.   Eyes:      General: Lids are normal.   HENT:      Head: Normocephalic.   Pulmonary:      Comments: Vapotherm 40 L FiO2 100%, nonrebreather mask also in place  Cardiovascular:      Normal rate.   Musculoskeletal:      Cervical back: Neck supple.   Skin:     Comments: No documented skin injuries   Genitourinary:     Comments: Voiding without difficulty  Neurological:      Mental Status: Mental status is at baseline.   Psychiatric:      Comments: Down syndrome      Patient status: Disease state: Controlled with current treatments.  Functional status: Palliative Performance Scale Score: Performance 40% based on the following measures: Ambulation: Mainly in bed, Activity and Evidence of Disease: Unable to do any work, extensive evidence of disease, Self-Care: Mainly assistance required,  Intake: Normal or reduced, LOC: Full, drowsy or confusion   Nutritional status: albumin 2.40. Body mass index is 52.34 kg/m².      Family support: The patient receives support from his father..  Advance Directives: no advance directive of file     POA/Healthcare surrogate-Kevin samayoa-next of kin     Impression/Problem List:     1.  Acute respiratory failure with hypoxia  2.  Pneumonia due to COVID-19 virus  3.  Morbid obesity  4.  Down syndrome  5.  Transaminitis     Recommendations/Plan:  1. plan: Goals of care include CODE STATUS No CPR/Limited support interventions.     2.  Palliative care encounter  -guarded prognosis secondary to acute respiratory failure " with hypoxia due to COVID-19 pneumonia, high rate of oxygenation needs as patient is currently maxed on Vapotherm and has a nonrebreather mask in place, and multiple co-morbidities.    -Plans to continue current aggressive interventions      -plans to de-escalate oxygen trial per nursing.     1/17-Discussed in the event of further respiratory decline may consider comfort based-care at that juncture.        Thank you for allowing us to participate in patient's plan of care. Palliative Care Team will continue to follow patient.     Time spent: 30 minutes spent reviewing medical and medication records, assessing and examining patient, discussing with family, answering questions, providing some guidance about a plan and documentation of care, and coordinating care with other healthcare members, with > 50% time spent face to face.     Rafaela Sanders, APRN  1/19/2022

## 2022-01-19 NOTE — PROGRESS NOTES
INFECTIOUS DISEASES PROGRESS NOTE    Patient:  Kevin Linder  YOB: 1980  MRN: [id number]   Admit date: 1/16/2022   Admitting Physician: Billy Koch MD  Primary Care Physician: Olimpia Stevens APRN    Chief Complaint: Not obtained from patient        Interval History: Patient's father at bedside.  He is doing better today.  His oxygen requirements have improved to 35 L and 70%.  He is actually currently saturating 97 to 99%.  DOMINGO Doty feels like he can wean him further    He was on maximum Vapotherm and a nonrebreather yesterday    Allergies: No Known Allergies    Current Meds:     Current Facility-Administered Medications:   •  acetaminophen (TYLENOL) tablet 650 mg, 650 mg, Oral, Q4H PRN, 650 mg at 01/19/22 0022 **OR** acetaminophen (TYLENOL) suppository 650 mg, 650 mg, Rectal, Q4H PRN, Billy Koch MD  •  albuterol sulfate HFA (PROVENTIL HFA;VENTOLIN HFA;PROAIR HFA) inhaler 2 puff, 2 puff, Inhalation, 4x Daily - RT, Billy Koch MD, 2 puff at 01/19/22 1153  •  allopurinol (ZYLOPRIM) tablet 100 mg, 100 mg, Oral, Daily, Billy Koch MD, 100 mg at 01/19/22 0821  •  ascorbic acid (VITAMIN C) tablet 500 mg, 500 mg, Oral, Daily, Billy Koch MD, 500 mg at 01/19/22 0822  •  benzonatate (TESSALON) capsule 100 mg, 100 mg, Oral, TID PRN, Billy Koch MD, 100 mg at 01/18/22 2023  •  budesonide-formoterol (SYMBICORT) 160-4.5 MCG/ACT inhaler 2 puff, 2 puff, Inhalation, BID - RT, Billy Koch MD, 2 puff at 01/19/22 0715  •  cefepime (MAXIPIME) 2 g/100 mL 0.9% NS (mbp), 2 g, Intravenous, Q8H, Billy Koch MD, 2 g at 01/19/22 1337  •  cholecalciferol (VITAMIN D3) tablet 2,000 Units, 2,000 Units, Oral, Daily, Billy Koch MD, 2,000 Units at 01/19/22 0820  •  dexamethasone (DECADRON) tablet 6 mg, 6 mg, Oral, Daily, 6 mg at 01/18/22 0945 **OR** dexamethasone (DECADRON) injection 6 mg, 6 mg, Intravenous, Daily, Billy Koch MD, 6 mg at 01/19/22 0824  •  dextromethorphan polistirex ER  "(DELSYM) 30 MG/5ML oral suspension 60 mg, 60 mg, Oral, Q12H PRN, Billy Koch MD  •  enoxaparin (LOVENOX) syringe 40 mg, 40 mg, Subcutaneous, Q24H, Billy Koch MD, 40 mg at 22  •  famotidine (PEPCID) tablet 20 mg, 20 mg, Oral, BID AC, Billy Koch MD, 20 mg at 22  •  guaiFENesin (MUCINEX) 12 hr tablet 600 mg, 600 mg, Oral, Q12H, Billy Koch MD, 600 mg at 22  •  [COMPLETED] Insert peripheral IV, , , Once **AND** sodium chloride 0.9 % flush 10 mL, 10 mL, Intravenous, PRN, Billy Koch MD  •  sodium zirconium cyclosilicate (LOKELMA) pack 10 g, 10 g, Oral, BID, Billy Koch MD, 10 g at 224  •  tamsulosin (FLOMAX) 24 hr capsule 0.4 mg, 0.4 mg, Oral, Daily, Billy Koch MD, 0.4 mg at 22  •  zinc sulfate (ZINCATE) capsule 220 mg, 220 mg, Oral, Daily, Billy Koch MD, 220 mg at 22  •  ziprasidone (GEODON) injection 10 mg, 10 mg, Intramuscular, Once, Billy Koch MD      Review of Systems    Not obtained directly from patient.    Objective     Vital Signs:  Temp (24hrs), Av.3 °F (36.3 °C), Min:96.9 °F (36.1 °C), Max:97.8 °F (36.6 °C)      /69 (BP Location: Right arm, Patient Position: Lying)   Pulse 69   Temp 97.8 °F (36.6 °C) (Axillary)   Resp 18   Ht 152.4 cm (60\")   Wt 124 kg (273 lb 12.8 oz)   SpO2 100%   BMI 53.47 kg/m²         Physical Exam     General: Patient is a morbidly obese male lying in bed in no acute distress.  His father is at bedside   HEENT: Nasal cannula in place and connected to Vapotherm.   Respiratory: Effort was even and minimally labored.  He is on Vapotherm at 35 L and 70% saturations of 97 and I am percent  Neuro: Patient was awake but did not respond directly to me.  His father was at bedside  Psych: Patient was not agitated      Results Review:    I reviewed the patient's new clinical results.    Lab Results:  CBC:   Lab Results   Lab 22  1522 22  0323 22  0310 " 01/19/22  0406   WBC 5.35 4.16 7.11 7.71   HEMOGLOBIN 11.8* 12.4* 12.1* 11.8*   HEMATOCRIT 35.9* 37.8 37.4* 36.1*   PLATELETS 331 379 423 401   LYMPHOCYTE %  --   --  4.1*  --    MONOCYTES %  --   --  13.3*  --          CMP:   Lab Results   Lab 01/17/22  0323 01/18/22  0310 01/19/22  0406   SODIUM 134* 136 135*   POTASSIUM 5.3* 4.9 5.4*   CHLORIDE 96* 99 101   CO2 24.0 23.0 27.0   BUN 25* 34* 34*   CREATININE 1.70* 1.41* 1.30*   CALCIUM 7.6* 8.2* 8.1*   BILIRUBIN 0.3 0.3 0.3   ALK PHOS 120* 102 92   ALT (SGPT) 126* 100* 83*   AST (SGOT) 147* 83* 62*   GLUCOSE 156* 130* 122*       Estimated Creatinine Clearance: 85 mL/min (A) (by C-G formula based on SCr of 1.3 mg/dL (H)).    COVID LABS:  Results From Last 14 Days   Lab Units 01/19/22  0406 01/18/22 0310 01/17/22 0323 01/16/22 2055 01/16/22 1522 01/16/22  1522 01/07/22 2123 01/07/22 2123   PROBNP pg/mL  --   --   --   --   --  272.4  --   --    CRP mg/dL 2.21* 6.58* 16.62*  --    < > 15.83*   < > 3.59*   D DIMER QUANT mg/L (FEU)  --   --   --   --   --  1.44*  --  0.70*   FERRITIN ng/mL 933.90* 1,209.00*  --   --   --  1,432.00*  --   --    LACTATE mmol/L  --   --   --  1.5  --  3.5*  --  1.7   PROCALCITONIN ng/mL 0.39*  --  0.81*  --   --  0.81*   < > 0.18   PROTIME Seconds  --   --   --   --   --   --   --  11.9   INR   --   --   --   --   --   --   --  0.91   TROPONIN T ng/mL  --   --   --   --   --  <0.010  --  <0.010    < > = values in this interval not displayed.           Culture Results:    Microbiology Results (last 10 days)     Procedure Component Value - Date/Time    Legionella Antigen, Urine - Urine, Urine, Clean Catch [[id number]]  (Normal) Collected: 01/17/22 0336    Lab Status: Final result Specimen: Urine, Clean Catch Updated: 01/17/22 0523     LEGIONELLA ANTIGEN, URINE Negative    S. Pneumo Ag Urine or CSF - Urine, Urine, Clean Catch [[id number]]  (Normal) Collected: 01/17/22 0336    Lab Status: Final result Specimen: Urine, Clean Catch Updated:  01/17/22 0523     Strep Pneumo Ag Negative    Blood Culture - Blood, Arm, Left [[id number]]  (Normal) Collected: 01/16/22 1523    Lab Status: Preliminary result Specimen: Blood from Arm, Left Updated: 01/18/22 1600     Blood Culture No growth at 2 days    Blood Culture - Blood, Arm, Left [[id number]]  (Normal) Collected: 01/16/22 1522    Lab Status: Preliminary result Specimen: Blood from Arm, Left Updated: 01/18/22 1600     Blood Culture No growth at 2 days    COVID-19,[facility]-HOU[facility],Nasal Swab No Transport Media 3-4 HR TAT - Swab, Nasal Cavity [[id number]]  (Abnormal) Collected: 01/16/22 1420    Lab Status: Final result Specimen: Swab from Nasal Cavity Updated: 01/16/22 1525     COVID19 Detected    Narrative:      Fact sheet for providers: https://www.fda.gov/media/163195/download     Fact sheet for patients: https://www.fda.gov/media/616022/download    Test performed by PCR.    Consider negative results in combination with clinical observations, patient history, and epidemiological information.               Radiology:           Imaging Results (Last 72 Hours)     Procedure Component Value Units Date/Time    CT Angiogram Chest [[id number]] Collected: 01/16/22 1652     Updated: 01/16/22 1657    Narrative:      EXAMINATION: CT ANGIOGRAM CHEST-      1/16/2022 4:13 PM CST     HISTORY: PE suspected, low/intermediate prob, positive D-dimer;  J96.01-Acute respiratory failure with hypoxia     In order to have a CT radiation dose as low as reasonably achievable  Automated Exposure Control was utilized for adjustment of the mA and/or  KV according to patient size.     DLP in mGycm= 642.     CT angiogram chest.  CT angiography protocol.   CT imaging with bolus IV contrast injection.   Under concurrent supervision axial, sagittal, coronal, and  three-dimensional data sets were constructed.     Comparison is made with January 7, 2022.     Normal heart size.  Normal thoracic aorta.  Symmetric and normally opacified pulmonary  arteries.  No pulmonary embolism.     Patchy infiltrate throughout both lungs compatible with COVID pneumonia.     No pneumothorax or pleural effusion.     Summary:  1. No pulmonary embolism.  2. Prominent bilateral pneumonia.                                   This report was finalized on 01/16/2022 16:54 by Dr. Fabio Johnson MD.    XR Chest 1 View [[id number]] Collected: 01/16/22 1516     Updated: 01/16/22 1521    Narrative:      EXAMINATION: Chest 1 view 1/16/2022     HISTORY: Shortness of air     FINDINGS: Upright frontal projection of chest is compared to prior exam  of 1/7/2022. Lungs are hypoventilated causing accentuation of the  bronchovascular markings and cardiac silhouette. There is diffuse  bilateral pneumonia with extensive consolidation within both lungs. No  effusion or free air present.       Impression:      1. Expiratory chest.  2. Diffuse consolidation within both lungs consistent with either  diffuse pneumonia or pulmonary edema.  This report was finalized on 01/16/2022 15:18 by Dr. Nawaf Hernandez MD.          Assessment/Plan     Active Hospital Problems    Diagnosis    • Acute respiratory failure with hypoxia (HCC)    • Pneumonia due to COVID-19 virus    • Down syndrome    • Obesity, morbid, BMI 50 or higher (HCC)        IMPRESSION:  Acute respiratory failure secondary to COVID-19 infection with pneumonia-patient is currently maxed on Vapotherm with nonrebreather.  Dr. Koch has spoken with the patient's dad at least twice today after discussion with family and patient is now no CPR.  Original test was January 7 (day #12 from positive test) and patient had symptoms 3 days prior according to emergency department visit that day.  (Day #15 from symptom onset) patient currently on dexamethasone, remdesivir started January 16 but discontinued January 17 given lack of benefit., tocilizumab given today.     Elevated transaminases-improved      Elevated CRP-improved     Acute kidney injury  -creatinine was 0.9 on 1/12/2021-improved    RECOMMENDATION:   · Continue dexamethasone 6 mg daily  · Encouraged proning, near proning, side to side-  · VTE prophylaxis per attending  · Adjuvant therapy per attending  · Continue empiric cefepime for now-no plan for prolonged course        Katy Diane MD  01/19/22  14:27 CST

## 2022-01-19 NOTE — PLAN OF CARE
Goal Outcome Evaluation:  Plan of Care Reviewed With: patient           Outcome Summary: Patient cooperative today. Father remains at bedside. Difficulty with I&O cath, started flomax and placed indwelling cath. VSS on vapotherm and nonrebreather. Desats quickly when nonrebreather removed.

## 2022-01-20 LAB
ALBUMIN SERPL-MCNC: 2.6 G/DL (ref 3.5–5.2)
ALBUMIN/GLOB SERPL: 0.7 G/DL
ALP SERPL-CCNC: 108 U/L (ref 39–117)
ALT SERPL W P-5'-P-CCNC: 117 U/L (ref 1–41)
ANION GAP SERPL CALCULATED.3IONS-SCNC: 11 MMOL/L (ref 5–15)
ANISOCYTOSIS BLD QL: ABNORMAL
AST SERPL-CCNC: 70 U/L (ref 1–40)
BILIRUB SERPL-MCNC: 0.3 MG/DL (ref 0–1.2)
BUN SERPL-MCNC: 33 MG/DL (ref 6–20)
BUN/CREAT SERPL: 26 (ref 7–25)
CALCIUM SPEC-SCNC: 8.4 MG/DL (ref 8.6–10.5)
CHLORIDE SERPL-SCNC: 98 MMOL/L (ref 98–107)
CO2 SERPL-SCNC: 26 MMOL/L (ref 22–29)
CREAT SERPL-MCNC: 1.27 MG/DL (ref 0.76–1.27)
CRP SERPL-MCNC: 1.08 MG/DL (ref 0–0.5)
DEPRECATED RDW RBC AUTO: 47.3 FL (ref 37–54)
ERYTHROCYTE [DISTWIDTH] IN BLOOD BY AUTOMATED COUNT: 14.7 % (ref 12.3–15.4)
FERRITIN SERPL-MCNC: 818.2 NG/ML (ref 30–400)
GFR SERPL CREATININE-BSD FRML MDRD: 63 ML/MIN/1.73
GIANT PLATELETS: ABNORMAL
GLOBULIN UR ELPH-MCNC: 3.9 GM/DL
GLUCOSE SERPL-MCNC: 108 MG/DL (ref 65–99)
HCT VFR BLD AUTO: 39.8 % (ref 37.5–51)
HGB BLD-MCNC: 13.3 G/DL (ref 13–17.7)
LYMPHOCYTES # BLD MANUAL: 0.62 10*3/MM3 (ref 0.7–3.1)
LYMPHOCYTES NFR BLD MANUAL: 8.1 % (ref 5–12)
MCH RBC QN AUTO: 29.5 PG (ref 26.6–33)
MCHC RBC AUTO-ENTMCNC: 33.4 G/DL (ref 31.5–35.7)
MCV RBC AUTO: 88.2 FL (ref 79–97)
METAMYELOCYTES NFR BLD MANUAL: 1 % (ref 0–0)
MONOCYTES # BLD: 0.62 10*3/MM3 (ref 0.1–0.9)
MYELOCYTES NFR BLD MANUAL: 1 % (ref 0–0)
NEUTROPHILS # BLD AUTO: 6.21 10*3/MM3 (ref 1.7–7)
NEUTROPHILS NFR BLD MANUAL: 77.8 % (ref 42.7–76)
NEUTS BAND NFR BLD MANUAL: 3 % (ref 0–5)
NRBC SPEC MANUAL: 1 /100 WBC (ref 0–0.2)
PLATELET # BLD AUTO: 442 10*3/MM3 (ref 140–450)
PMV BLD AUTO: 9.3 FL (ref 6–12)
POIKILOCYTOSIS BLD QL SMEAR: ABNORMAL
POLYCHROMASIA BLD QL SMEAR: ABNORMAL
POTASSIUM SERPL-SCNC: 4.3 MMOL/L (ref 3.5–5.2)
PROMYELOCYTES NFR BLD MANUAL: 1 % (ref 0–0)
PROT SERPL-MCNC: 6.5 G/DL (ref 6–8.5)
RBC # BLD AUTO: 4.51 10*6/MM3 (ref 4.14–5.8)
SODIUM SERPL-SCNC: 135 MMOL/L (ref 136–145)
VARIANT LYMPHS NFR BLD MANUAL: 8.1 % (ref 19.6–45.3)
WBC MORPH BLD: NORMAL
WBC NRBC COR # BLD: 7.69 10*3/MM3 (ref 3.4–10.8)

## 2022-01-20 PROCEDURE — 94799 UNLISTED PULMONARY SVC/PX: CPT

## 2022-01-20 PROCEDURE — 99233 SBSQ HOSP IP/OBS HIGH 50: CPT | Performed by: INTERNAL MEDICINE

## 2022-01-20 PROCEDURE — 82728 ASSAY OF FERRITIN: CPT | Performed by: FAMILY MEDICINE

## 2022-01-20 PROCEDURE — 85007 BL SMEAR W/DIFF WBC COUNT: CPT | Performed by: FAMILY MEDICINE

## 2022-01-20 PROCEDURE — 25010000002 CEFEPIME PER 500 MG: Performed by: FAMILY MEDICINE

## 2022-01-20 PROCEDURE — 25010000002 ENOXAPARIN PER 10 MG: Performed by: FAMILY MEDICINE

## 2022-01-20 PROCEDURE — 86140 C-REACTIVE PROTEIN: CPT | Performed by: FAMILY MEDICINE

## 2022-01-20 PROCEDURE — 85025 COMPLETE CBC W/AUTO DIFF WBC: CPT | Performed by: FAMILY MEDICINE

## 2022-01-20 PROCEDURE — 80053 COMPREHEN METABOLIC PANEL: CPT | Performed by: FAMILY MEDICINE

## 2022-01-20 PROCEDURE — 63710000001 DEXAMETHASONE PER 0.25 MG: Performed by: FAMILY MEDICINE

## 2022-01-20 RX ADMIN — BUDESONIDE AND FORMOTEROL FUMARATE DIHYDRATE 2 PUFF: 160; 4.5 AEROSOL RESPIRATORY (INHALATION) at 20:06

## 2022-01-20 RX ADMIN — FAMOTIDINE 20 MG: 20 TABLET, FILM COATED ORAL at 09:08

## 2022-01-20 RX ADMIN — ALBUTEROL SULFATE 2 PUFF: 90 AEROSOL, METERED RESPIRATORY (INHALATION) at 20:06

## 2022-01-20 RX ADMIN — ZINC SULFATE 220 MG (50 MG) CAPSULE 220 MG: CAPSULE at 09:09

## 2022-01-20 RX ADMIN — ALBUTEROL SULFATE 2 PUFF: 90 AEROSOL, METERED RESPIRATORY (INHALATION) at 11:05

## 2022-01-20 RX ADMIN — ALLOPURINOL 100 MG: 100 TABLET ORAL at 09:07

## 2022-01-20 RX ADMIN — CEFEPIME 2 G: 2 INJECTION, POWDER, FOR SOLUTION INTRAVENOUS at 05:56

## 2022-01-20 RX ADMIN — Medication 2000 UNITS: at 09:07

## 2022-01-20 RX ADMIN — ENOXAPARIN SODIUM 40 MG: 40 INJECTION SUBCUTANEOUS at 21:00

## 2022-01-20 RX ADMIN — DEXAMETHASONE 6 MG: 4 TABLET ORAL at 09:08

## 2022-01-20 RX ADMIN — CEFEPIME 2 G: 2 INJECTION, POWDER, FOR SOLUTION INTRAVENOUS at 21:01

## 2022-01-20 RX ADMIN — SODIUM ZIRCONIUM CYCLOSILICATE 10 G: 10 POWDER, FOR SUSPENSION ORAL at 09:10

## 2022-01-20 RX ADMIN — OXYCODONE HYDROCHLORIDE AND ACETAMINOPHEN 500 MG: 500 TABLET ORAL at 09:09

## 2022-01-20 RX ADMIN — GUAIFENESIN 600 MG: 600 TABLET, EXTENDED RELEASE ORAL at 21:00

## 2022-01-20 RX ADMIN — ALBUTEROL SULFATE 2 PUFF: 90 AEROSOL, METERED RESPIRATORY (INHALATION) at 07:14

## 2022-01-20 RX ADMIN — TAMSULOSIN HYDROCHLORIDE 0.4 MG: 0.4 CAPSULE ORAL at 09:09

## 2022-01-20 RX ADMIN — CEFEPIME 2 G: 2 INJECTION, POWDER, FOR SOLUTION INTRAVENOUS at 16:36

## 2022-01-20 RX ADMIN — GUAIFENESIN 600 MG: 600 TABLET, EXTENDED RELEASE ORAL at 09:09

## 2022-01-20 RX ADMIN — FAMOTIDINE 20 MG: 20 TABLET, FILM COATED ORAL at 17:22

## 2022-01-20 RX ADMIN — BUDESONIDE AND FORMOTEROL FUMARATE DIHYDRATE 2 PUFF: 160; 4.5 AEROSOL RESPIRATORY (INHALATION) at 07:14

## 2022-01-20 NOTE — CASE MANAGEMENT/SOCIAL WORK
Continued Stay Note  JALYN Lucia     Patient Name: Kevin Linder  MRN: [id number]  Today's Date: 1/20/2022    Admit Date: 1/16/2022     Discharge Plan     Row Name 01/20/22 1210       Plan    Plan Home    Plan Comments Patient is improving, has orders to move to medical floor.  Patient will return home with his father as before.               Discharge Codes    No documentation.                     NADIA Domínguez

## 2022-01-20 NOTE — PROGRESS NOTES
[facility] Medicine Services  INPATIENT PROGRESS NOTE    Length of Stay: 4  Date of Admission: 1/16/2022  Primary Care Physician: Olimpia Stevens APRN    Subjective   Chief Complaint: COVID-pneumonia.  Hypoxia.  Down syndrome.    HPI   Patient is on 3 L of oxygen today.  Plan transfer the floor.  Afebrile.  Blood pressure stable.  Good urine output.  Father is at bedside.    Review of Systems   Unable to obtain because patient does not talk.  Most information is from the dad.       All pertinent negatives and positives are as above. All other systems have been reviewed and are negative unless otherwise stated.     Objective    Temp:  [97.5 °F (36.4 °C)-98.1 °F (36.7 °C)] 97.6 °F (36.4 °C)  Heart Rate:  [] 85  Resp:  [17-21] 17  BP: (101-124)/(59-97) 105/64    Intake/Output Summary (Last 24 hours) at 1/20/2022 0948  Last data filed at 1/20/2022 0920  Gross per 24 hour   Intake 993.4 ml   Output 4400 ml   Net -3406.6 ml     Physical Exam  Vitals and nursing note reviewed.   Constitutional:       General: He is not in acute distress.     Appearance: He is well-developed. He is not toxic-appearing.      Comments: Down syndrome features.  Patient does not talk.   HENT:      Head: Normocephalic.      Mouth/Throat:      Mouth: Mucous membranes are moist.      Pharynx: Oropharynx is clear.   Eyes:      General: No scleral icterus.     Extraocular Movements: Extraocular movements intact.      Conjunctiva/sclera: Conjunctivae normal.      Pupils: Pupils are equal, round, and reactive to light.   Neck:      Thyroid: No thyromegaly.      Vascular: No carotid bruit or JVD.      Trachea: No tracheal deviation.   Cardiovascular:      Rate and Rhythm: Normal rate and regular rhythm.      Pulses: Normal pulses.      Heart sounds: No murmur heard.  No friction rub. No gallop.    Pulmonary:      Effort: No respiratory distress.      Breath sounds: No wheezing, rhonchi or rales.       Comments: Vapotherm/nonrebreather above that.  Diminished breath sound bilateral, clear.  Chest:      Chest wall: No tenderness.   Abdominal:      General: Bowel sounds are normal. There is no distension.      Palpations: Abdomen is soft.      Tenderness: There is no abdominal tenderness.      Comments: Morbid obesity.  BMI is 52.   Musculoskeletal:         General: No swelling or tenderness. Normal range of motion.      Cervical back: Normal range of motion and neck supple. No muscular tenderness.   Skin:     General: Skin is warm and dry.      Capillary Refill: Capillary refill takes 2 to 3 seconds.      Findings: No erythema or rash.   Neurological:      Mental Status: He is alert.      Cranial Nerves: No cranial nerve deficit.      Motor: Weakness present.      Coordination: Coordination abnormal.      Gait: Gait abnormal.   Results Review:  Lab Results (last 24 hours)     Procedure Component Value Units Date/Time    Manual Differential [[id number]]  (Abnormal) Collected: 01/20/22 0402    Specimen: Blood Updated: 01/20/22 0506     Neutrophil % 77.8 %      Lymphocyte % 8.1 %      Monocyte % 8.1 %      Bands %  3.0 %      Metamyelocyte % 1.0 %      Myelocyte % 1.0 %      Promyelocyte % 1.0 %      Neutrophils Absolute 6.21 10*3/mm3      Lymphocytes Absolute 0.62 10*3/mm3      Monocytes Absolute 0.62 10*3/mm3      nRBC 1.0 /100 WBC      Anisocytosis Slight/1+     Poikilocytes Slight/1+     Polychromasia Slight/1+     WBC Morphology Normal     Giant Platelets Slight/1+    CBC & Differential [[id number]]  (Normal) Collected: 01/20/22 0402    Specimen: Blood Updated: 01/20/22 0506    Narrative:      The following orders were created for panel order CBC & Differential.  Procedure                               Abnormality         Status                     ---------                               -----------         ------                     CBC Auto Differential[[id number]]        Normal              Final result                  Please view results for these tests on the individual orders.    CBC Auto Differential [[id number]]  (Normal) Collected: 01/20/22 0402    Specimen: Blood Updated: 01/20/22 0506     WBC 7.69 10*3/mm3      RBC 4.51 10*6/mm3      Hemoglobin 13.3 g/dL      Hematocrit 39.8 %      MCV 88.2 fL      MCH 29.5 pg      MCHC 33.4 g/dL      RDW 14.7 %      RDW-SD 47.3 fl      MPV 9.3 fL      Platelets 442 10*3/mm3     Comprehensive Metabolic Panel [[id number]]  (Abnormal) Collected: 01/20/22 0402    Specimen: Blood Updated: 01/20/22 0458     Glucose 108 mg/dL      BUN 33 mg/dL      Creatinine 1.27 mg/dL      Sodium 135 mmol/L      Potassium 4.3 mmol/L      Chloride 98 mmol/L      CO2 26.0 mmol/L      Calcium 8.4 mg/dL      Total Protein 6.5 g/dL      Albumin 2.60 g/dL      ALT (SGPT) 117 U/L      AST (SGOT) 70 U/L      Alkaline Phosphatase 108 U/L      Total Bilirubin 0.3 mg/dL      eGFR Non African Amer 63 mL/min/1.73      Globulin 3.9 gm/dL      A/G Ratio 0.7 g/dL      BUN/Creatinine Ratio 26.0     Anion Gap 11.0 mmol/L     Narrative:      GFR Normal >60  Chronic Kidney Disease <60  Kidney Failure <15      C-reactive Protein [[id number]]  (Abnormal) Collected: 01/20/22 0402    Specimen: Blood Updated: 01/20/22 0458     C-Reactive Protein 1.08 mg/dL     Ferritin [[id number]]  (Abnormal) Collected: 01/20/22 0402    Specimen: Blood Updated: 01/20/22 0458     Ferritin 818.20 ng/mL     Narrative:      Results may be falsely decreased if patient taking Biotin.      Respiratory Panel PCR w/COVID-19(SARS-CoV-2) SURYA/MAU/LINDA/PAD/COR/MAD/NEEL In-House, NP Swab in [facility], 3-4 HR TAT - Swab, Nasopharynx [[id number]]  (Abnormal) Collected: 01/19/22 1559    Specimen: Swab from Nasopharynx Updated: 01/19/22 6529     ADENOVIRUS, PCR Not Detected     Coronavirus 229E Not Detected     Coronavirus HKU1 Not Detected     Coronavirus NL63 Not Detected     Coronavirus OC43 Not Detected     COVID19 Detected     Human Metapneumovirus Not Detected      Human Rhinovirus/Enterovirus Not Detected     Influenza A PCR Not Detected     Influenza B PCR Not Detected     Parainfluenza Virus 1 Not Detected     Parainfluenza Virus 2 Not Detected     Parainfluenza Virus 3 Not Detected     Parainfluenza Virus 4 Not Detected     RSV, PCR Not Detected     Bordetella pertussis pcr Not Detected     Bordetella parapertussis PCR Not Detected     Chlamydophila pneumoniae PCR Not Detected     Mycoplasma pneumo by PCR Not Detected    Narrative:      In the setting of a positive respiratory panel with a viral infection PLUS a negative procalcitonin without other underlying concern for bacterial infection, consider observing off antibiotics or discontinuation of antibiotics and continue supportive care. If the respiratory panel is positive for atypical bacterial infection (Bordetella pertussis, Chlamydophila pneumoniae, or Mycoplasma pneumoniae), consider antibiotic de-escalation to target atypical bacterial infection.    Blood Culture - Blood, Arm, Left [[id number]]  (Normal) Collected: 01/16/22 1523    Specimen: Blood from Arm, Left Updated: 01/19/22 1600     Blood Culture No growth at 3 days    Blood Culture - Blood, Arm, Left [[id number]]  (Normal) Collected: 01/16/22 1522    Specimen: Blood from Arm, Left Updated: 01/19/22 1600     Blood Culture No growth at 3 days           Cultures:  Blood Culture   Date Value Ref Range Status   01/16/2022 No growth at 3 days  Preliminary   01/16/2022 No growth at 3 days  Preliminary       Radiology Data:    Imaging Results (Last 24 Hours)     ** No results found for the last 24 hours. **          No Known Allergies    Scheduled meds:   albuterol sulfate HFA, 2 puff, Inhalation, 4x Daily - RT  allopurinol, 100 mg, Oral, Daily  ascorbic acid, 500 mg, Oral, Daily  budesonide-formoterol, 2 puff, Inhalation, BID - RT  cefepime, 2 g, Intravenous, Q8H  cholecalciferol, 2,000 Units, Oral, Daily  dexamethasone, 6 mg, Oral, Daily   Or  dexamethasone, 6  mg, Intravenous, Daily  enoxaparin, 40 mg, Subcutaneous, Q24H  famotidine, 20 mg, Oral, BID AC  guaiFENesin, 600 mg, Oral, Q12H  sodium zirconium cyclosilicate, 10 g, Oral, BID  tamsulosin, 0.4 mg, Oral, Daily  zinc sulfate, 220 mg, Oral, Daily  ziprasidone, 10 mg, Intramuscular, Once        PRN meds:  •  acetaminophen **OR** acetaminophen  •  benzonatate  •  dextromethorphan polistirex ER  •  [COMPLETED] Insert peripheral IV **AND** sodium chloride    Assessment/Plan       Acute respiratory failure with hypoxia (HCC)    Pneumonia due to COVID-19 virus    Down syndrome    Obesity, morbid, BMI 50 or higher (HCC)      Plan:  Acute respiratory failure/hypoxia/COVID-pneumonia.  Symptoms of COVID started January 8.  Consult pulmonary for Tocilizumab.  Incentive spirometer . flutter valve.  Vitamin C.  Zinc.  Vitamin D . consult.  Infectious disease.  Cefepime due to elevated procalcitonin.  Consult . Decadron.  Consult pharmacy for remdesivir.  Tocilizumab 1/17/2022.  Chest x-ray-Expiratory chest, Diffuse consolidation within both lungs consistent with either  diffuse pneumonia or pulmonary edema.  CTA of the chest-No pulmonary embolism, prominent bilateral pneumonia.  Patient is on 3 L of oxygen.     Hypotension.    Improving.    History of Down syndrome.  Echocardiogram-ejection fraction 60%, mild to moderate concentric hypertrophy, diastolic function is normal.     Gout.  Continue allopurinol.     Hyponatremia.  Stable..      Hyperkalemia.    Resolved.     Renal insufficiency.    Creatinine improving.  Slow IV hydration     Elevated liver enzyme.   Worsening liver function.  Acute hepatitis pendin-negative     Lovenox prophylaxis renal protocol     Gross morbid obesity/BMI is 42.     Blood cultures-no growth in 3 days.  COVID-19- positive.  Legionella-negative.  Strep pneumo-negative.     Discharge Planning:  3-6 days.  DNR.  DNI.  Prognosis guarded.  Palliative has been consulted.    Electronically signed by Billy PETERSEN  MD August, 01/20/22, 9:48 AM CST.

## 2022-01-20 NOTE — PROGRESS NOTES
INFECTIOUS DISEASES PROGRESS NOTE    Patient:  Kevin Linder  YOB: 1980  MRN: [id number]   Admit date: 1/16/2022   Admitting Physician: Billy Koch MD  Primary Care Physician: Olimpia Stevens APRN    Chief Complaint: Not obtained from patient        Interval History: Patient's father at bedside.  He feels like he is continuing to do better.  The patient is on 3 L nasal cannula.  He was on max Vapotherm just 2 days ago        Allergies: No Known Allergies    Current Meds:     Current Facility-Administered Medications:   •  acetaminophen (TYLENOL) tablet 650 mg, 650 mg, Oral, Q4H PRN, 650 mg at 01/19/22 0022 **OR** acetaminophen (TYLENOL) suppository 650 mg, 650 mg, Rectal, Q4H PRN, Billy Koch MD  •  albuterol sulfate HFA (PROVENTIL HFA;VENTOLIN HFA;PROAIR HFA) inhaler 2 puff, 2 puff, Inhalation, 4x Daily - RT, Billy Koch MD, 2 puff at 01/20/22 0714  •  allopurinol (ZYLOPRIM) tablet 100 mg, 100 mg, Oral, Daily, Billy Koch MD, 100 mg at 01/20/22 0907  •  ascorbic acid (VITAMIN C) tablet 500 mg, 500 mg, Oral, Daily, Billy Koch MD, 500 mg at 01/20/22 0909  •  benzonatate (TESSALON) capsule 100 mg, 100 mg, Oral, TID PRN, Billy Koch MD, 100 mg at 01/18/22 2023  •  budesonide-formoterol (SYMBICORT) 160-4.5 MCG/ACT inhaler 2 puff, 2 puff, Inhalation, BID - RT, Billy Koch MD, 2 puff at 01/20/22 0714  •  cefepime (MAXIPIME) 2 g/100 mL 0.9% NS (mbp), 2 g, Intravenous, Q8H, Billy Koch MD, 2 g at 01/20/22 0556  •  cholecalciferol (VITAMIN D3) tablet 2,000 Units, 2,000 Units, Oral, Daily, Billy Koch MD, 2,000 Units at 01/20/22 0907  •  dexamethasone (DECADRON) tablet 6 mg, 6 mg, Oral, Daily, 6 mg at 01/20/22 0908 **OR** dexamethasone (DECADRON) injection 6 mg, 6 mg, Intravenous, Daily, Billy Koch MD, 6 mg at 01/19/22 0824  •  dextromethorphan polistirex ER (DELSYM) 30 MG/5ML oral suspension 60 mg, 60 mg, Oral, Q12H PRN, Billy Koch MD  •  enoxaparin  "(LOVENOX) syringe 40 mg, 40 mg, Subcutaneous, Q24H, Billy Koch MD, 40 mg at 22  •  famotidine (PEPCID) tablet 20 mg, 20 mg, Oral, BID AC, Billy Koch MD, 20 mg at 22  •  guaiFENesin (MUCINEX) 12 hr tablet 600 mg, 600 mg, Oral, Q12H, Billy Koch MD, 600 mg at 22  •  [COMPLETED] Insert peripheral IV, , , Once **AND** sodium chloride 0.9 % flush 10 mL, 10 mL, Intravenous, PRN, Billy Koch MD  •  sodium zirconium cyclosilicate (LOKELMA) pack 10 g, 10 g, Oral, BID, Billy Koch MD, 10 g at 22  •  tamsulosin (FLOMAX) 24 hr capsule 0.4 mg, 0.4 mg, Oral, Daily, Billy Koch MD, 0.4 mg at 22  •  zinc sulfate (ZINCATE) capsule 220 mg, 220 mg, Oral, Daily, Billy Koch MD, 220 mg at 22  •  ziprasidone (GEODON) injection 10 mg, 10 mg, Intramuscular, Once, Billy Koch MD      Review of Systems    Patient did not offer any complaints verbally.    Objective     Vital Signs:  Temp (24hrs), Av.8 °F (36.6 °C), Min:97.5 °F (36.4 °C), Max:98.1 °F (36.7 °C)      /64   Pulse 85   Temp 97.6 °F (36.4 °C) (Oral)   Resp 17   Ht 152.4 cm (60\")   Wt 123 kg (271 lb 9.7 oz)   SpO2 90%   BMI 53.04 kg/m²         Physical Exam     General: Patient is a morbidly obese male lying in bed in no acute distress.  His father is at bedside   HEENT: Nasal cannula in place at 3 L.  Respiratory: Effort was even and not labored.  O2 sats were 93 to 95%.  Neuro: Patient was awake, was not verbal however did tend to follow the conversation back-and-forth between his dad and myself.  Psych: Patient was not agitated      Results Review:    I reviewed the patient's new clinical results.    Lab Results:  CBC:   Lab Results   Lab 22  1522 22  0323 22  0310 22  0406 22  0402   WBC 5.35 4.16 7.11 7.71 7.69   HEMOGLOBIN 11.8* 12.4* 12.1* 11.8* 13.3   HEMATOCRIT 35.9* 37.8 37.4* 36.1* 39.8   PLATELETS 331 379 423 401 442 "   LYMPHOCYTE %  --   --  4.1*  --  8.1*   MONOCYTES %  --   --  13.3*  --  8.1         CMP:   Lab Results   Lab 01/18/22 0310 01/19/22 0406 01/20/22 0402   SODIUM 136 135* 135*   POTASSIUM 4.9 5.4* 4.3   CHLORIDE 99 101 98   CO2 23.0 27.0 26.0   BUN 34* 34* 33*   CREATININE 1.41* 1.30* 1.27   CALCIUM 8.2* 8.1* 8.4*   BILIRUBIN 0.3 0.3 0.3   ALK PHOS 102 92 108   ALT (SGPT) 100* 83* 117*   AST (SGOT) 83* 62* 70*   GLUCOSE 130* 122* 108*       Estimated Creatinine Clearance: 86.6 mL/min (by C-G formula based on SCr of 1.27 mg/dL).    COVID LABS:  Results From Last 14 Days   Lab Units 01/20/22 0402 01/19/22  0406 01/18/22 0310 01/17/22 0323 01/17/22 0323 01/16/22 2055 01/16/22 1522 01/16/22  1522 01/07/22 2123 01/07/22 2123   PROBNP pg/mL  --   --   --   --   --   --   --  272.4  --   --    CRP mg/dL 1.08* 2.21* 6.58*   < > 16.62*  --    < > 15.83*   < > 3.59*   D DIMER QUANT mg/L (FEU)  --   --   --   --   --   --   --  1.44*  --  0.70*   FERRITIN ng/mL 818.20* 933.90* 1,209.00*  --   --   --    < > 1,432.00*  --   --    LACTATE mmol/L  --   --   --   --   --  1.5  --  3.5*  --  1.7   PROCALCITONIN ng/mL  --  0.39*  --   --  0.81*  --   --  0.81*   < > 0.18   PROTIME Seconds  --   --   --   --   --   --   --   --   --  11.9   INR   --   --   --   --   --   --   --   --   --  0.91   TROPONIN T ng/mL  --   --   --   --   --   --   --  <0.010  --  <0.010    < > = values in this interval not displayed.           Culture Results:    Microbiology Results (last 10 days)     Procedure Component Value - Date/Time    Respiratory Panel PCR w/COVID-19(SARS-CoV-2) [facility]YA/MAU/LINDA/PAD/COR/MAD/NEEL In-House, NP Swab in UTM/VTM, 3-4 HR TAT - Swab, Nasopharynx [[id number]]  (Abnormal) Collected: 01/19/22 1888    Lab Status: Final result Specimen: Swab from Nasopharynx Updated: 01/19/22 5032     ADENOVIRUS, PCR Not Detected     Coronavirus 229E Not Detected     Coronavirus HKU1 Not Detected     Coronavirus NL63 Not Detected      Coronavirus OC43 Not Detected     COVID19 Detected     Human Metapneumovirus Not Detected     Human Rhinovirus/Enterovirus Not Detected     Influenza A PCR Not Detected     Influenza B PCR Not Detected     Parainfluenza Virus 1 Not Detected     Parainfluenza Virus 2 Not Detected     Parainfluenza Virus 3 Not Detected     Parainfluenza Virus 4 Not Detected     RSV, PCR Not Detected     Bordetella pertussis pcr Not Detected     Bordetella parapertussis PCR Not Detected     Chlamydophila pneumoniae PCR Not Detected     Mycoplasma pneumo by PCR Not Detected    Narrative:      In the setting of a positive respiratory panel with a viral infection PLUS a negative procalcitonin without other underlying concern for bacterial infection, consider observing off antibiotics or discontinuation of antibiotics and continue supportive care. If the respiratory panel is positive for atypical bacterial infection (Bordetella pertussis, Chlamydophila pneumoniae, or Mycoplasma pneumoniae), consider antibiotic de-escalation to target atypical bacterial infection.    Legionella Antigen, Urine - Urine, Urine, Clean Catch [[id number]]  (Normal) Collected: 01/17/22 0336    Lab Status: Final result Specimen: Urine, Clean Catch Updated: 01/17/22 0523     LEGIONELLA ANTIGEN, URINE Negative    S. Pneumo Ag Urine or CSF - Urine, Urine, Clean Catch [[id number]]  (Normal) Collected: 01/17/22 0336    Lab Status: Final result Specimen: Urine, Clean Catch Updated: 01/17/22 0523     Strep Pneumo Ag Negative    Blood Culture - Blood, Arm, Left [[id number]]  (Normal) Collected: 01/16/22 1523    Lab Status: Preliminary result Specimen: Blood from Arm, Left Updated: 01/19/22 1600     Blood Culture No growth at 3 days    Blood Culture - Blood, Arm, Left [[id number]]  (Normal) Collected: 01/16/22 1522    Lab Status: Preliminary result Specimen: Blood from Arm, Left Updated: 01/19/22 1600     Blood Culture No growth at 3 days    COVID-19,[facility] IN-HOUSE,Nasal  Swab No Transport Media 3-4 HR TAT - Swab, Nasal Cavity [[id number]]  (Abnormal) Collected: 01/16/22 1420    Lab Status: Final result Specimen: Swab from Nasal Cavity Updated: 01/16/22 1525     COVID19 Detected    Narrative:      Fact sheet for providers: https://www.fda.gov/media/247759/download     Fact sheet for patients: https://www.fda.gov/media/148335/download    Test performed by PCR.    Consider negative results in combination with clinical observations, patient history, and epidemiological information.               Radiology:           Imaging Results (Last 72 Hours)     ** No results found for the last 72 hours. **          Assessment/Plan     Active Hospital Problems    Diagnosis    • Acute respiratory failure with hypoxia (HCC)    • Pneumonia due to COVID-19 virus    • Down syndrome    • Obesity, morbid, BMI 50 or higher (HCC)        IMPRESSION:  Acute respiratory failure secondary to COVID-19 infection with pneumonia-patient is currently maxed on Vapotherm with nonrebreather.  Dr. Koch has spoken with the patient's dad at least twice today after discussion with family and patient is now no CPR.  Original test was January 7 (day #14 from positive test) and patient had symptoms 3 days prior according to emergency department visit that day.  (Day #17 from symptom onset) patient currently on dexamethasone, remdesivir started January 16 but discontinued January 17 given lack of benefit., tocilizumab given      Elevated transaminases-     Elevated CRP-improved     Acute kidney injury -creatinine was 0.9 on 1/12/2021- continues to improve    RECOMMENDATION:   · Continue dexamethasone 6 mg daily  · Encouraged proning, near proning, side to side-  · VTE prophylaxis per attending  · Adjuvant therapy per attending  · Continue empiric cefepime-plan for possible discontinuation tomorrow        Katy Diane MD  01/20/22  09:37 CST

## 2022-01-20 NOTE — NURSING NOTE
Pt. to be transferred to room 464. Receiving nurse, Mansi, off the floor. Report called to Denise Hamlin RN.

## 2022-01-20 NOTE — PROGRESS NOTES
PULMONARY AND CRITICAL CARE PROGRESS NOTE - [facility]    Patient: Kevin Linder  1980   MR# [id number]   Acct# [id number]  01/20/22   09:21 CST  Referring Provider: Billy Koch MD    Chief Complaint: Covid-19     Interval history: He remains in COVID isolation.  He is afebrile.  CRP normalized.  Dad reports he did not get much sleep last night but clinically doing much better.  Current saturation 91 on 2 L.  Respiratory rate 16.  Tadeo catheter in place.    Okay to the floor from a pulmonary perspective.  Wean off O2 if able.  We will sign off.  Continue Mucinex for mucus clearing.  Symbicort albuterol for bronchodilation.  Pepcid while on steroids.  He is dexamethasone D#5.  Continue through day 10.  DVT prophylaxis, Lovenox 40 daily.  Continue to encourage proning, nutrition, hydration and mobilization.    Meds:  albuterol sulfate HFA, 2 puff, Inhalation, 4x Daily - RT  allopurinol, 100 mg, Oral, Daily  ascorbic acid, 500 mg, Oral, Daily  budesonide-formoterol, 2 puff, Inhalation, BID - RT  cefepime, 2 g, Intravenous, Q8H  cholecalciferol, 2,000 Units, Oral, Daily  dexamethasone, 6 mg, Oral, Daily   Or  dexamethasone, 6 mg, Intravenous, Daily  enoxaparin, 40 mg, Subcutaneous, Q24H  famotidine, 20 mg, Oral, BID AC  guaiFENesin, 600 mg, Oral, Q12H  sodium zirconium cyclosilicate, 10 g, Oral, BID  tamsulosin, 0.4 mg, Oral, Daily  zinc sulfate, 220 mg, Oral, Daily  ziprasidone, 10 mg, Intramuscular, Once         Review of Systems:   Review of Systems   Unable to perform ROS: Other   Patient has Down syndrome    Physical Exam:  SpO2 Percentage    01/20/22 0800 01/20/22 0815 01/20/22 0900   SpO2: (!) 89% 92% 90%     Temp:  [97.5 °F (36.4 °C)-98.1 °F (36.7 °C)] 97.6 °F (36.4 °C)  Heart Rate:  [] 85  Resp:  [17-21] 17  BP: (101-124)/(59-97) 105/64    Intake/Output Summary (Last 24 hours) at 1/20/2022 0921  Last data filed at 1/20/2022 0920  Gross per 24 hour   Intake 993.4 ml   Output 4400  ml   Net -3406.6 ml     Body mass index is 53.04 kg/m².     GENERAL/CONSTITUTIONAL: No distress.  Down syndrome features  HEENT: atraumatic, normocephalic. Extraocular movements normal  NOSE: normal, 2 L  NECK: jugular veins nondistended  CHEST: no paradox, no retractions.  No respiratory distress.   CARDIAC: Sinus rhythm, rate 82  ABDOMEN: nondistended, obese  :  Tadeo  EXTREMITIES: No edema.  NEURO: Awake, alert, calm, sitting up  PSYCH: no agitation  SKIN: no jaundice.  No rash    Electronically signed by CHANDLER Fuentes, 1/20/2022, 09:21 CST        Physician Substantive Portion: Medical Decision Making    Laboratory Data:  Results from last 7 days   Lab Units 01/20/22  0402 01/19/22  0406 01/18/22  0310   WBC 10*3/mm3 7.69 7.71 7.11   HEMOGLOBIN g/dL 13.3 11.8* 12.1*   PLATELETS 10*3/mm3 442 401 423     Results from last 7 days   Lab Units 01/20/22  0402 01/19/22  0406 01/18/22  0310   SODIUM mmol/L 135* 135* 136   POTASSIUM mmol/L 4.3 5.4* 4.9   BUN mg/dL 33* 34* 34*   CREATININE mg/dL 1.27 1.30* 1.41*     Results from last 7 days   Lab Units 01/16/22  1416   PH, ARTERIAL pH units 7.440   PCO2, ARTERIAL mm Hg 37.0   PO2 ART mm Hg 50.2*   FIO2 % 100     Blood Culture   Date Value Ref Range Status   01/16/2022 No growth at 3 days  Preliminary   01/16/2022 No growth at 3 days  Preliminary     Results from last 7 days   Lab Units 01/20/22  0402 01/19/22  0406 01/18/22  0310 01/17/22  0323 01/16/22  1522 01/16/22  1522   CRP mg/dL 1.08* 2.21*   < > 16.62*   < > 15.83*   PROCALCITONIN ng/mL  --  0.39*  --  0.81*   < > 0.81*   FERRITIN ng/mL 818.20* 933.90*   < >  --   --  1,432.00*   D DIMER QUANT mg/L (FEU)  --   --   --   --   --  1.44*    < > = values in this interval not displayed.      Recent films:  No radiology results for the last day     My radiograph interpretation/independent review of imaging: Reviewed and agree with current interpretation    Pulmonary Assessment:     1. Acute hypoxic respiratory  failure  2. SARS-COV2 bilateral viral pneumonia  3. Down's syndrome  4. Morbid obesity  5. Acute respiratory distress syndrome with increased oxygen demand  6. Patient is DNR status.     Recommend/plan:   · Patient is stable from respiratory standpoint and has significant improvement overnight.   · Patient currently off BiPAP and Vapotherm and nonrebreather mask and is on 3 L oxygen.    · No acute respiratory complaints Father was present in the room.   · He is getting transferred to medical floor today.    · He appears better and comfortable and did not have any acute distress this morning.  · Continue current treatment plan for COVID with dexamethasone day # 5.  · Plan for 10 days of dexamethasone therapy.  · He is on remdesivir and also on adjunct treatment for COVID-19.  · He received a dose of Tocilizumab.  · Continue routine respiratory care and pulmonary toilet.  Patient is unable to do much incentive spirometry.  · He has morbid obesity and obstructive sleep apnea and will benefit from BiPAP but he is unable to tolerate BiPAP.  · Continue DVT prophylaxis with Lovenox and stress ulcer prophylaxis  · Continue nutritional support.  Repeat labs and imaging studies and monitor inflammatory markers.  · Physical activity as tolerated.  · CODE STATUS: Patient is DNR/DNI.  · Overall prognosis: Guarded.  However he has significant improvement overnight  · We will sign off.  Please call us if needed.  · We appreciate the consult.     This visit was performed by both a physician and an Advanced Practice RN.  I personally evaluated and examined the patient.  I performed all aspects of the medical decision making as documented.    Electronically signed by     Monae Rebollar MD,  Pulmonologist/intensivist.   1/20/2022, 13:23 CST

## 2022-01-21 ENCOUNTER — READMISSION MANAGEMENT (OUTPATIENT)
Dept: CALL CENTER | Facility: HOSPITAL | Age: 42
End: 2022-01-21

## 2022-01-21 VITALS
BODY MASS INDEX: 53.41 KG/M2 | TEMPERATURE: 97.6 F | RESPIRATION RATE: 16 BRPM | WEIGHT: 272.06 LBS | DIASTOLIC BLOOD PRESSURE: 72 MMHG | HEIGHT: 60 IN | OXYGEN SATURATION: 94 % | HEART RATE: 108 BPM | SYSTOLIC BLOOD PRESSURE: 120 MMHG

## 2022-01-21 PROBLEM — D89.832 CYTOKINE RELEASE SYNDROME, GRADE 2: Status: ACTIVE | Noted: 2022-01-21

## 2022-01-21 LAB
ALBUMIN SERPL-MCNC: 2.3 G/DL (ref 3.5–5.2)
ALBUMIN/GLOB SERPL: 0.6 G/DL
ALP SERPL-CCNC: 87 U/L (ref 39–117)
ALT SERPL W P-5'-P-CCNC: 84 U/L (ref 1–41)
ANION GAP SERPL CALCULATED.3IONS-SCNC: 13 MMOL/L (ref 5–15)
ANISOCYTOSIS BLD QL: ABNORMAL
AST SERPL-CCNC: 36 U/L (ref 1–40)
BACTERIA SPEC AEROBE CULT: NORMAL
BACTERIA SPEC AEROBE CULT: NORMAL
BILIRUB SERPL-MCNC: 0.2 MG/DL (ref 0–1.2)
BUN SERPL-MCNC: 32 MG/DL (ref 6–20)
BUN/CREAT SERPL: 25.6 (ref 7–25)
CALCIUM SPEC-SCNC: 8.3 MG/DL (ref 8.6–10.5)
CHLORIDE SERPL-SCNC: 101 MMOL/L (ref 98–107)
CO2 SERPL-SCNC: 22 MMOL/L (ref 22–29)
CREAT SERPL-MCNC: 1.25 MG/DL (ref 0.76–1.27)
CRP SERPL-MCNC: 0.56 MG/DL (ref 0–0.5)
DEPRECATED RDW RBC AUTO: 53.2 FL (ref 37–54)
ERYTHROCYTE [DISTWIDTH] IN BLOOD BY AUTOMATED COUNT: 15.6 % (ref 12.3–15.4)
FERRITIN SERPL-MCNC: 538.5 NG/ML (ref 30–400)
GFR SERPL CREATININE-BSD FRML MDRD: 64 ML/MIN/1.73
GLOBULIN UR ELPH-MCNC: 3.6 GM/DL
GLUCOSE SERPL-MCNC: 110 MG/DL (ref 65–99)
HCT VFR BLD AUTO: 42.5 % (ref 37.5–51)
HGB BLD-MCNC: 13.3 G/DL (ref 13–17.7)
LYMPHOCYTES # BLD MANUAL: 1.24 10*3/MM3 (ref 0.7–3.1)
LYMPHOCYTES NFR BLD MANUAL: 8 % (ref 5–12)
MCH RBC QN AUTO: 29.3 PG (ref 26.6–33)
MCHC RBC AUTO-ENTMCNC: 31.3 G/DL (ref 31.5–35.7)
MCV RBC AUTO: 93.6 FL (ref 79–97)
METAMYELOCYTES NFR BLD MANUAL: 2.3 % (ref 0–0)
MONOCYTES # BLD: 0.63 10*3/MM3 (ref 0.1–0.9)
MYELOCYTES NFR BLD MANUAL: 1.1 % (ref 0–0)
NEUTROPHILS # BLD AUTO: 5.69 10*3/MM3 (ref 1.7–7)
NEUTROPHILS NFR BLD MANUAL: 70.5 % (ref 42.7–76)
NEUTS BAND NFR BLD MANUAL: 2.3 % (ref 0–5)
PLAT MORPH BLD: NORMAL
PLATELET # BLD AUTO: 329 10*3/MM3 (ref 140–450)
PMV BLD AUTO: 10.5 FL (ref 6–12)
POIKILOCYTOSIS BLD QL SMEAR: ABNORMAL
POLYCHROMASIA BLD QL SMEAR: ABNORMAL
POTASSIUM SERPL-SCNC: 3.9 MMOL/L (ref 3.5–5.2)
PROCALCITONIN SERPL-MCNC: 0.17 NG/ML (ref 0–0.25)
PROT SERPL-MCNC: 5.9 G/DL (ref 6–8.5)
RBC # BLD AUTO: 4.54 10*6/MM3 (ref 4.14–5.8)
SODIUM SERPL-SCNC: 136 MMOL/L (ref 136–145)
TARGETS BLD QL SMEAR: ABNORMAL
VARIANT LYMPHS NFR BLD MANUAL: 12.5 % (ref 19.6–45.3)
VARIANT LYMPHS NFR BLD MANUAL: 3.4 % (ref 0–5)
WBC MORPH BLD: NORMAL
WBC NRBC COR # BLD: 7.82 10*3/MM3 (ref 3.4–10.8)

## 2022-01-21 PROCEDURE — 94799 UNLISTED PULMONARY SVC/PX: CPT

## 2022-01-21 PROCEDURE — 82728 ASSAY OF FERRITIN: CPT | Performed by: FAMILY MEDICINE

## 2022-01-21 PROCEDURE — 25010000002 CEFEPIME PER 500 MG: Performed by: FAMILY MEDICINE

## 2022-01-21 PROCEDURE — 94618 PULMONARY STRESS TESTING: CPT

## 2022-01-21 PROCEDURE — 36415 COLL VENOUS BLD VENIPUNCTURE: CPT | Performed by: FAMILY MEDICINE

## 2022-01-21 PROCEDURE — 63710000001 DEXAMETHASONE PER 0.25 MG: Performed by: FAMILY MEDICINE

## 2022-01-21 PROCEDURE — 85025 COMPLETE CBC W/AUTO DIFF WBC: CPT | Performed by: FAMILY MEDICINE

## 2022-01-21 PROCEDURE — 85007 BL SMEAR W/DIFF WBC COUNT: CPT | Performed by: FAMILY MEDICINE

## 2022-01-21 PROCEDURE — 84145 PROCALCITONIN (PCT): CPT | Performed by: FAMILY MEDICINE

## 2022-01-21 PROCEDURE — 86140 C-REACTIVE PROTEIN: CPT | Performed by: FAMILY MEDICINE

## 2022-01-21 PROCEDURE — 80053 COMPREHEN METABOLIC PANEL: CPT | Performed by: FAMILY MEDICINE

## 2022-01-21 PROCEDURE — 99232 SBSQ HOSP IP/OBS MODERATE 35: CPT | Performed by: CLINICAL NURSE SPECIALIST

## 2022-01-21 RX ORDER — DEXAMETHASONE 6 MG/1
6 TABLET ORAL DAILY
Qty: 5 TABLET | Refills: 0 | Status: SHIPPED | OUTPATIENT
Start: 2022-01-22 | End: 2022-01-27

## 2022-01-21 RX ORDER — TAMSULOSIN HYDROCHLORIDE 0.4 MG/1
0.4 CAPSULE ORAL DAILY
Qty: 90 CAPSULE | Refills: 0 | Status: SHIPPED | OUTPATIENT
Start: 2022-01-22 | End: 2022-04-18 | Stop reason: SDUPTHER

## 2022-01-21 RX ORDER — GUAIFENESIN 600 MG/1
600 TABLET, EXTENDED RELEASE ORAL EVERY 12 HOURS SCHEDULED
Qty: 60 TABLET | Refills: 0 | Status: SHIPPED | OUTPATIENT
Start: 2022-01-21

## 2022-01-21 RX ORDER — FAMOTIDINE 20 MG/1
20 TABLET, FILM COATED ORAL 2 TIMES DAILY PRN
Qty: 60 TABLET | Refills: 2 | Status: SHIPPED | OUTPATIENT
Start: 2022-01-21 | End: 2022-05-09 | Stop reason: SDUPTHER

## 2022-01-21 RX ORDER — CEFDINIR 300 MG/1
300 CAPSULE ORAL 2 TIMES DAILY
Qty: 8 CAPSULE | Refills: 0 | Status: SHIPPED | OUTPATIENT
Start: 2022-01-21 | End: 2022-01-25

## 2022-01-21 RX ORDER — DEXTROMETHORPHAN POLISTIREX 30 MG/5ML
60 SUSPENSION ORAL EVERY 12 HOURS PRN
Qty: 188 ML | Refills: 2 | Status: SHIPPED | OUTPATIENT
Start: 2022-01-21

## 2022-01-21 RX ORDER — BENZONATATE 100 MG/1
100 CAPSULE ORAL 3 TIMES DAILY PRN
Qty: 30 CAPSULE | Refills: 2 | Status: SHIPPED | OUTPATIENT
Start: 2022-01-21 | End: 2022-05-13 | Stop reason: SDUPTHER

## 2022-01-21 RX ORDER — ZINC SULFATE 50(220)MG
220 CAPSULE ORAL DAILY
Qty: 30 CAPSULE | Refills: 0 | Status: SHIPPED | OUTPATIENT
Start: 2022-01-22

## 2022-01-21 RX ORDER — ASCORBIC ACID 500 MG
500 TABLET ORAL DAILY
Qty: 30 TABLET | Refills: 0 | Status: SHIPPED | OUTPATIENT
Start: 2022-01-22

## 2022-01-21 RX ORDER — ALBUTEROL SULFATE 90 UG/1
2 AEROSOL, METERED RESPIRATORY (INHALATION)
Qty: 8.5 G | Refills: 3 | Status: SHIPPED | OUTPATIENT
Start: 2022-01-21

## 2022-01-21 RX ORDER — BUDESONIDE AND FORMOTEROL FUMARATE DIHYDRATE 160; 4.5 UG/1; UG/1
2 AEROSOL RESPIRATORY (INHALATION)
Refills: 12
Start: 2022-01-21 | End: 2022-12-06

## 2022-01-21 RX ADMIN — FAMOTIDINE 20 MG: 20 TABLET, FILM COATED ORAL at 10:15

## 2022-01-21 RX ADMIN — DEXAMETHASONE 6 MG: 4 TABLET ORAL at 10:15

## 2022-01-21 RX ADMIN — ALBUTEROL SULFATE 2 PUFF: 90 AEROSOL, METERED RESPIRATORY (INHALATION) at 11:02

## 2022-01-21 RX ADMIN — GUAIFENESIN 600 MG: 600 TABLET, EXTENDED RELEASE ORAL at 10:15

## 2022-01-21 RX ADMIN — ALLOPURINOL 100 MG: 100 TABLET ORAL at 10:14

## 2022-01-21 RX ADMIN — OXYCODONE HYDROCHLORIDE AND ACETAMINOPHEN 500 MG: 500 TABLET ORAL at 10:15

## 2022-01-21 RX ADMIN — BUDESONIDE AND FORMOTEROL FUMARATE DIHYDRATE 2 PUFF: 160; 4.5 AEROSOL RESPIRATORY (INHALATION) at 07:49

## 2022-01-21 RX ADMIN — ALBUTEROL SULFATE 2 PUFF: 90 AEROSOL, METERED RESPIRATORY (INHALATION) at 07:49

## 2022-01-21 RX ADMIN — Medication 2000 UNITS: at 10:15

## 2022-01-21 RX ADMIN — ZINC SULFATE 220 MG (50 MG) CAPSULE 220 MG: CAPSULE at 10:14

## 2022-01-21 RX ADMIN — TAMSULOSIN HYDROCHLORIDE 0.4 MG: 0.4 CAPSULE ORAL at 10:15

## 2022-01-21 RX ADMIN — ALBUTEROL SULFATE 2 PUFF: 90 AEROSOL, METERED RESPIRATORY (INHALATION) at 15:18

## 2022-01-21 RX ADMIN — CEFEPIME 2 G: 2 INJECTION, POWDER, FOR SOLUTION INTRAVENOUS at 05:40

## 2022-01-21 NOTE — PROGRESS NOTES
Continued Stay Note  [facility]     Patient Name: Kevin Linder  MRN: [id number]  Today's Date: 1/21/2022    Admit Date: 1/16/2022     Discharge Plan     Row Name 01/21/22 1358       Plan    Plan Home Oxygen and Pulse Ox    Patient/Family in Agreement with Plan yes    Final Discharge Disposition Code 01 - home or self-care    Final Note Patient is discharged today with orders for home oxygen and pulse ox. AYLIN provided pulse ox from [facility]. Walking oximetry is pending but patient and his daughter did advise AYLIN that they would like to use [employer]. AYLIN has contacted Maddi with [employer] and told her that the walking oximetry is pending. Maddi as access to [facility] and can obtain orders and testing through [facility]. If patient qualifies for home o2, portable tank will be delivered to room.                Expected Discharge Date and Time     Expected Discharge Date Expected Discharge Time    Jan 21, 2022             NADIA Devlin

## 2022-01-21 NOTE — PROGRESS NOTES
Exercise Oximetry    Patient Name:Kevin Linder   MRN: [id number]   Date: 01/21/22             ROOM AIR BASELINE   SpO2% 91   Heart Rate 113   Blood Pressure      EXERCISE ON ROOM AIR SpO2% EXERCISE ON O2 @ 5 LPM SpO2%   1 MINUTE 86 1 MINUTE 92   2 MINUTES  2 MINUTES 93   3 MINUTES  3 MINUTES    4 MINUTES  4 MINUTES    5 MINUTES  5 MINUTES    6 MINUTES  6 MINUTES               Distance Walked   Distance Walked   Dyspnea (Mirza Scale)   Dyspnea (Mirza Scale)   Fatigue (Mirza Scale)   Fatigue (Mirza Scale)   SpO2% Post Exercise   SpO2% Post Exercise   HR Post Exercise   HR Post Exercise   Time to Recovery   Time to Recovery     Comments: Pt started on 3 lpm walking with O2, desat to 88% increased to 4 lpm with sat decreasing to 88%.  Pt placed on 5 lpm and sat remained at 91-92%.

## 2022-01-21 NOTE — DISCHARGE SUMMARY
[facility]  DISCHARGE SUMMARY       Date of Admission: 1/16/2022  Date of Discharge:  1/21/2022  Primary Care Physician: Olimpia Stevens APRN    Presenting Problem/History of Present Illness:  Acute respiratory failure with hypoxia (HCC) [J96.01]     Final Discharge Diagnoses:  Active Hospital Problems    Diagnosis    • Cytokine release syndrome, grade 2    • Acute respiratory failure with hypoxia (HCC)    • Pneumonia due to COVID-19 virus    • Down syndrome    • Obesity, morbid, BMI 50 or higher (HCC)        Consults: Infectious disease . palliative care . pulmonary .    Pertinent Test Results:   Results for orders placed during the hospital encounter of 01/16/22    Adult Transthoracic Echo Complete W/ Cont if Necessary Per Protocol    Interpretation Summary  · Left ventricular wall thickness is consistent with mild to moderate concentric hypertrophy.  · Estimated left ventricular EF = 60% Left ventricular systolic function is normal.  · Left ventricular diastolic function was normal.    .  Imaging Results (All)     Procedure Component Value Units Date/Time    CT Angiogram Chest [[id number]] Collected: 01/16/22 1652     Updated: 01/16/22 1657    Narrative:      EXAMINATION: CT ANGIOGRAM CHEST-      1/16/2022 4:13 PM CST     HISTORY: PE suspected, low/intermediate prob, positive D-dimer;  J96.01-Acute respiratory failure with hypoxia     In order to have a CT radiation dose as low as reasonably achievable  Automated Exposure Control was utilized for adjustment of the mA and/or  KV according to patient size.     DLP in mGycm= 642.     CT angiogram chest.  CT angiography protocol.   CT imaging with bolus IV contrast injection.   Under concurrent supervision axial, sagittal, coronal, and  three-dimensional data sets were constructed.     Comparison is made with January 7, 2022.     Normal heart size.  Normal thoracic aorta.  Symmetric and normally opacified  pulmonary arteries.  No pulmonary embolism.     Patchy infiltrate throughout both lungs compatible with COVID pneumonia.     No pneumothorax or pleural effusion.     Summary:  1. No pulmonary embolism.  2. Prominent bilateral pneumonia.                                   This report was finalized on 01/16/2022 16:54 by Dr. Fabio Johnson MD.    XR Chest 1 View [[id number]] Collected: 01/16/22 1516     Updated: 01/16/22 1521    Narrative:      EXAMINATION: Chest 1 view 1/16/2022     HISTORY: Shortness of air     FINDINGS: Upright frontal projection of chest is compared to prior exam  of 1/7/2022. Lungs are hypoventilated causing accentuation of the  bronchovascular markings and cardiac silhouette. There is diffuse  bilateral pneumonia with extensive consolidation within both lungs. No  effusion or free air present.       Impression:      1. Expiratory chest.  2. Diffuse consolidation within both lungs consistent with either  diffuse pneumonia or pulmonary edema.  This report was finalized on 01/16/2022 15:18 by Dr. Nawaf Hernandez MD.        Chief Complaint on Day of Discharge: none    History of Present Illness on Day of Discharge:  Patient is a 41-year-old black male presented ER with complaint of shortness of breath.  Patient has history of Down syndrome.  Patient symptoms started on January 8, patient was brought to ER by his dad.  Patient was evaluated sent home.  Patient did not get any bam because ER was out of it at that time.  Main problem was mainly increasing shortness of breath.  Dad also said he had fever night sweats chills symptoms off and on.  Patient loss in taste but for couple days.  Patient does also have symptoms of vomiting and diarrhea with it.  Patient has been vaccinated with [employer] in February.  No booster at this time.  Patient has not been fasting for the flu.     In ER patient was hypotensive, low sodium, renal insufficiency.  Elevated liver enzyme.  CT scan showed bilateral  "pneumonia, COVID positive.  Patient does not talk.    Hospital Course:  The patient is a 41 y.o. male who presented to [facility] with COVID-pneumonia/respiratory failure with hypoxia/Down syndrome.      Acute respiratory failure hypoxia/COVID-pneumonia.  Symptoms of COVID started January 8.  Consult pulmonary for Tocilizumab.  Incentive spirometer . flutter valve.  Vitamin C.  Zinc.  Vitamin D . consult.  Infectious disease.  Cefepime due to elevated procalcitonin.  Consult . Decadron.  Consult pharmacy for remdesivir.  Tocilizumab 1/17/2022.  Chest x-ray-Expiratory chest, Diffuse consolidation within both lungs consistent with either  diffuse pneumonia or pulmonary edema.  CTA of the chest-No pulmonary embolism, prominent bilateral pneumonia.  Patient is on 3 L of oxygen.     Hypotension.    Improving.     History of Down syndrome.  Echocardiogram-ejection fraction 60%, mild to moderate concentric hypertrophy, diastolic function is normal.     Gout.  Continue allopurinol.     Hyponatremia.  Stable..      Hyperkalemia.    Resolved.     Renal insufficiency.    Creatinine improving.  Slow IV hydration     Elevated liver enzyme.   Worsening liver function.  Acute hepatitis pendin-negative     Lovenox prophylaxis renal protocol     Gross morbid obesity/BMI is 42.     Blood cultures-no growth in 3 days.  COVID-19- positive.  Legionella-negative.  Strep pneumo-negative.     Vital signs stable, afebrile.  DNR.  DNI.  Plan to discharge patient home today.  Discharge home with family.  Patient go home with home health.  Follow-up with PCP 1 week.  Discussed with  to give patient home oximetry to go home with .     Condition on Discharge: Stable.    Physical Exam on Discharge:  /72 (BP Location: Right arm, Patient Position: Lying)   Pulse 96   Temp 97.6 °F (36.4 °C) (Oral)   Resp 16   Ht 152.4 cm (60\")   Wt 123 kg (272 lb 1 oz)   SpO2 94%   BMI 53.13 kg/m²   Physical Exam  Vitals and " nursing note reviewed.   Constitutional:       General: He is not in acute distress.     Appearance: He is well-developed. He is not toxic-appearing.      Comments: Down syndrome features.  Patient does not talk.   HENT:      Head: Normocephalic.      Mouth/Throat:      Mouth: Mucous membranes are moist.      Pharynx: Oropharynx is clear.   Eyes:      General: No scleral icterus.     Extraocular Movements: Extraocular movements intact.      Conjunctiva/sclera: Conjunctivae normal.      Pupils: Pupils are equal, round, and reactive to light.   Neck:      Thyroid: No thyromegaly.      Vascular: No carotid bruit or JVD.      Trachea: No tracheal deviation.   Cardiovascular:      Rate and Rhythm: Normal rate and regular rhythm.      Pulses: Normal pulses.      Heart sounds: No murmur heard.  No friction rub. No gallop.    Pulmonary:      Effort: No respiratory distress.      Breath sounds: No wheezing, rhonchi or rales.      Comments: Patient is on 3 L of oxygen.  Diminished breath sound bilateral, clear.  Chest:      Chest wall: No tenderness.   Abdominal:      General: Bowel sounds are normal. There is no distension.      Palpations: Abdomen is soft.      Tenderness: There is no abdominal tenderness.      Comments: Morbid obesity.  BMI is 52.   Musculoskeletal:         General: No swelling or tenderness. Normal range of motion.      Cervical back: Normal range of motion and neck supple. No muscular tenderness.   Skin:     General: Skin is warm and dry.      Capillary Refill: Capillary refill takes 2 to 3 seconds.      Findings: No erythema or rash.   Neurological:      Mental Status: He is alert.      Cranial Nerves: No cranial nerve deficit.      Motor: Weakness present.      Coordination: Coordination abnormal.      Gait: Gait abnormal.     Discharge Disposition:      Discharge Medications:     Discharge Medications      New Medications      Instructions Start Date   albuterol sulfate  (90 Base) MCG/ACT  inhaler  Commonly known as: PROVENTIL HFA;VENTOLIN HFA;PROAIR HFA   2 puffs, Inhalation, 4 Times Daily - RT      apixaban 2.5 MG tablet tablet  Commonly known as: ELIQUIS   2.5 mg, Oral, 2 Times Daily      ascorbic acid 500 MG tablet  Commonly known as: VITAMIN C   500 mg, Oral, Daily   Start Date: January 22, 2022     benzonatate 100 MG capsule  Commonly known as: TESSALON   100 mg, Oral, 3 Times Daily PRN      budesonide-formoterol 160-4.5 MCG/ACT inhaler  Commonly known as: SYMBICORT   2 puffs, Inhalation, 2 Times Daily - RT      cefdinir 300 MG capsule  Commonly known as: OMNICEF   300 mg, Oral, 2 Times Daily      cholecalciferol 25 MCG (1000 UT) tablet  Commonly known as: VITAMIN D3   2,000 Units, Oral, Daily   Start Date: January 22, 2022     dexamethasone 6 MG tablet  Commonly known as: DECADRON   6 mg, Oral, Daily   Start Date: January 22, 2022     dextromethorphan polistirex ER 30 MG/5ML Suspension Extended Release oral suspension  Commonly known as: DELSYM   60 mg, Oral, Every 12 Hours PRN      famotidine 20 MG tablet  Commonly known as: PEPCID   20 mg, Oral, 2 Times Daily PRN      guaiFENesin 600 MG 12 hr tablet  Commonly known as: MUCINEX   600 mg, Oral, Every 12 Hours Scheduled      tamsulosin 0.4 MG capsule 24 hr capsule  Commonly known as: FLOMAX   0.4 mg, Oral, Daily   Start Date: January 22, 2022     zinc sulfate 220 (50 Zn) MG capsule  Commonly known as: ZINCATE   220 mg, Oral, Daily   Start Date: January 22, 2022        Continue These Medications      Instructions Start Date   allopurinol 300 MG tablet  Commonly known as: ZYLOPRIM   Take 1 tablet by mouth daily      ammonium lactate 12 % lotion  Commonly known as: LAC-HYDRIN   Apply topically to affected area(s) daily.             Discharge Diet:   Diet Instructions     Advance Diet As Tolerated            Activity at Discharge:   Activity Instructions     Activity as Tolerated            Discharge Care Plan/Instructions: Discharge home with  family.  Patient go home with home health.    Follow-up Appointments:   Follow-up with PCP 1 week.  Patient go home with home health.    Electronically signed by Billy Koch MD, 01/21/22, 13:45 CST.    Time: Greater than 30 minutes.

## 2022-01-21 NOTE — PLAN OF CARE
Goal Outcome Evaluation:  Plan of Care Reviewed With: patient, caregiver, family        Progress: improving  Outcome Summary: Pt tranferred from ICU to room 464. VSS on 3L/NC, pt with hx of down syndrome, speaks very few words, does follow simple commands, father at bedisde, occasional NP cough, HR NSR 70's, IV abx continue, bed alarm in use

## 2022-01-22 ENCOUNTER — READMISSION MANAGEMENT (OUTPATIENT)
Dept: CALL CENTER | Facility: HOSPITAL | Age: 42
End: 2022-01-22

## 2022-01-22 ENCOUNTER — HOME HEALTH ADMISSION (OUTPATIENT)
Dept: HOME HEALTH SERVICES | Facility: HOME HEALTHCARE | Age: 42
End: 2022-01-22

## 2022-01-22 NOTE — CASE MANAGEMENT/SOCIAL WORK
Continued Stay Note  JAL[facility]     Patient Name: Kevin Linder  MRN: [id number]  Today's Date: 1/22/2022    Admit Date: 1/16/2022     Discharge Plan     Row Name 01/22/22 1507       Plan    Plan Comments Was informed that HH was ordered but was not addressed with pt/family. Called and spoke to pt father (Kevin) and he denies need for home health. Provided Kevin with number to reach SW if he decides HH is needed.               Discharge Codes    No documentation.               Expected Discharge Date and Time     Expected Discharge Date Expected Discharge Time    Jan 21, 2022             SESAR Charles

## 2022-01-22 NOTE — OUTREACH NOTE
Prep Survey      Responses   [facility] facility patient discharged from? [place]   Is LACE score < 7 ? No   Emergency Room discharge w/ pulse ox? No   Eligibility Readm Mgmt   Discharge diagnosis Cytokine release syndrome, grade Pneumonia due to COVID-19 virus   Does the patient have one of the following disease processes/diagnoses(primary or secondary)? COVID-19   Does the patient have Home health ordered? No   Is there a DME ordered? Yes   What DME was ordered? [employer]   Prep survey completed? Yes          Vi Ritchie RN

## 2022-01-22 NOTE — CASE MANAGEMENT/SOCIAL WORK
Continued Stay Note   [facility]     Patient Name: Kevin Linder  MRN: [id number]  Today's Date: 1/22/2022    Admit Date: 1/16/2022     Discharge Plan     Row Name 01/22/22 1514       Plan    Final Discharge Disposition Code 06 - home with home health care    Final Note Pt father (Kevin) [phone] called SW back and is requesting [facility]. Put HH orders in basket for central intake.    Row Name 01/22/22 2884       Plan    Plan Comments Was informed that HH was ordered but was not addressed with pt/family. Called and spoke to pt father (Kevin) and he denies need for home health. Provided Kevin with number to reach  if he decides HH is needed.               Discharge Codes    No documentation.               Expected Discharge Date and Time     Expected Discharge Date Expected Discharge Time    Jan 21, 2022             SESAR Charles

## 2022-01-22 NOTE — OUTREACH NOTE
COVID-19 Week 1 Survey      Responses   [facility] patient discharged from? [place]   Does the patient have one of the following disease processes/diagnoses(primary or secondary)? COVID-19   COVID-19 underlying condition? None   Call Number Call 1   Week 1 Call successful? Yes   Call start time [phone]   Call end time [phone]   Discharge diagnosis Cytokine release syndrome, grade Pneumonia due to COVID-19 virus   Is patient permission given to speak with other caregiver? Yes   List who call center can speak with father   Person spoke with today (if not patient) and relationship father   Meds reviewed with patient/caregiver? Yes   Is the patient having any side effects they believe may be caused by any medication additions or changes? No   Does the patient have all medications ordered at discharge? Yes   Is the patient taking all medications as directed (includes completed medication regime)? Yes   Does the patient have a primary care provider?  Yes   Does the patient have an appointment with their PCP or specialist within 7 days of discharge? No   What is preventing the patient from scheduling follow up appointments within 7 days of discharge? Haven't had time   Nursing Interventions Advised patient to make appointment   Has the patient kept scheduled appointments due by today? N/A   Has home health visited the patient within 72 hours of discharge? No   What DME was ordered? [employer]   Has all DME been delivered? Yes   Psychosocial issues? No   Did the patient receive a copy of their discharge instructions? Yes   Did the patient receive a copy of COVID-19 specific instructions? Yes   What is the patient's perception of their health status since discharge? Improving   Does the patient have any of the following symptoms? Cough,  Shortness of breath   Pulse Ox monitoring Intermittent   Pulse Ox device source Hospital   O2 Sat comments 39% 2L/NC   Is the patient/caregiver able to teach back steps to recovery at  home? Set small, achievable goals for return to baseline health,  Rest and rebuild strength, gradually increase activity   If the patient is a current smoker, are they able to teach back resources for cessation? Not a smoker   COVID-19 call completed? Yes   Wrap up additional comments Brief call, father and sister were in the process of bathing pt.  He is improving.            Hyacinth Handy, RN

## 2022-01-23 ENCOUNTER — READMISSION MANAGEMENT (OUTPATIENT)
Dept: CALL CENTER | Facility: HOSPITAL | Age: 42
End: 2022-01-23

## 2022-01-23 NOTE — OUTREACH NOTE
COVID-19 Week 1 Survey      Responses   [facility] patient discharged from? [place]   Does the patient have one of the following disease processes/diagnoses(primary or secondary)? COVID-19   COVID-19 underlying condition? None   Call Number Call 2   Week 1 Call successful? Yes   Call start time 1008   Discharge diagnosis Cytokine release syndrome, grade Pneumonia due to COVID-19 virus   Is patient permission given to speak with other caregiver? Yes   List who call center can speak with father   Person spoke with today (if not patient) and relationship father   Meds reviewed with patient/caregiver? Yes   Is the patient having any side effects they believe may be caused by any medication additions or changes? No   Does the patient have all medications ordered at discharge? Yes   Is the patient taking all medications as directed (includes completed medication regime)? Yes   Does the patient have a primary care provider?  Yes   Does the patient have an appointment with their PCP or specialist within 7 days of discharge? No   What is preventing the patient from scheduling follow up appointments within 7 days of discharge? Haven't had time   Nursing Interventions Educated patient on importance of making appointment,  Advised patient to make appointment   Has the patient kept scheduled appointments due by today? N/A   Has home health visited the patient within 72 hours of discharge? No   What DME was ordered? [employer]   Has all DME been delivered? Yes   Psychosocial issues? No   Did the patient receive a copy of their discharge instructions? Yes   Did the patient receive a copy of COVID-19 specific instructions? Yes   Does the patient have any of the following symptoms? Cough,  Shortness of breath   Pulse Ox monitoring Intermittent   Pulse Ox device source Hospital   O2 Sat comments 98% 3L/NC   O2 Sat: education provided Sat levels,  Monitoring frequency,  When to seek care   O2 Sat education comments <92% seek  urgent care   Is the patient/caregiver able to teach back steps to recovery at home? Rest and rebuild strength, gradually increase activity,  Practice good oral hygiene,  Eat a well-balance diet   If the patient is a current smoker, are they able to teach back resources for cessation? Not a smoker   Is the patient/caregiver able to teach back the hierarchy of who to call/visit for symptoms/problems? PCP, Specialist, Home health nurse, Urgent Care, ED, 911 Yes   COVID-19 call completed? Yes   Wrap up additional comments Spoke with father, patient resting, states he is improving.            Maria Teresa Kumar, RN

## 2022-01-24 ENCOUNTER — READMISSION MANAGEMENT (OUTPATIENT)
Dept: CALL CENTER | Facility: HOSPITAL | Age: 42
End: 2022-01-24

## 2022-01-24 NOTE — OUTREACH NOTE
COVID-19 Week 1 Survey      Responses   [facility] patient discharged from? [place]   Does the patient have one of the following disease processes/diagnoses(primary or secondary)? COVID-19   COVID-19 underlying condition? None   Call Number Call 3   Week 1 Call successful? Yes   Call start time 1213   Call end time 1215   General alerts for this patient Pt has Down syndrome   Discharge diagnosis Cytokine release syndrome, grade Pneumonia due to COVID-19 virus   Person spoke with today (if not patient) and relationship father   Meds reviewed with patient/caregiver? Yes   Is the patient having any side effects they believe may be caused by any medication additions or changes? No   Is the patient taking all medications as directed (includes completed medication regime)? Yes   Comments regarding PCP Pt has an appt with PCP    Has the patient kept scheduled appointments due by today? N/A   Nursing interventions Reviewed instructions with patient   What is the patient's perception of their health status since discharge? Improving   Does the patient have any of the following symptoms? Cough,  Shortness of breath   Nursing Interventions Nurse provided patient education   Pulse Ox monitoring Intermittent   O2 Sat comments 93% on 3 L of O2    Is the patient/caregiver able to teach back steps to recovery at home? Rest and rebuild strength, gradually increase activity,  Eat a well-balance diet,  Set small, achievable goals for return to baseline health   COVID-19 call completed? Yes          Nickie Ward RN

## 2022-01-25 ENCOUNTER — HOME CARE VISIT (OUTPATIENT)
Dept: HOME HEALTH SERVICES | Facility: CLINIC | Age: 42
End: 2022-01-25

## 2022-01-25 VITALS
OXYGEN SATURATION: 95 % | HEART RATE: 92 BPM | RESPIRATION RATE: 18 BRPM | TEMPERATURE: 98 F | SYSTOLIC BLOOD PRESSURE: 142 MMHG | DIASTOLIC BLOOD PRESSURE: 80 MMHG

## 2022-01-25 PROCEDURE — G0299 HHS/HOSPICE OF RN EA 15 MIN: HCPCS

## 2022-01-26 ENCOUNTER — HOME CARE VISIT (OUTPATIENT)
Dept: HOME HEALTH SERVICES | Facility: CLINIC | Age: 42
End: 2022-01-26

## 2022-01-26 ENCOUNTER — TELEPHONE (OUTPATIENT)
Dept: PRIMARY CARE CLINIC | Age: 42
End: 2022-01-26

## 2022-01-26 VITALS
DIASTOLIC BLOOD PRESSURE: 80 MMHG | TEMPERATURE: 96.6 F | HEART RATE: 80 BPM | RESPIRATION RATE: 18 BRPM | SYSTOLIC BLOOD PRESSURE: 118 MMHG | OXYGEN SATURATION: 99 %

## 2022-01-26 PROCEDURE — G0151 HHCP-SERV OF PT,EA 15 MIN: HCPCS

## 2022-01-26 NOTE — TELEPHONE ENCOUNTER
Pt called and LM on  1/25/2022 wanting to know if we had received [facility] paperwork. I called pt back after reviewing his chart and determined we have not yet received paperwork. I informed him we have received order from [employer] that will be addressed today, and I provided him with our fax number and requested he call his employer back and request they refax it to us.

## 2022-01-27 ENCOUNTER — READMISSION MANAGEMENT (OUTPATIENT)
Dept: CALL CENTER | Facility: HOSPITAL | Age: 42
End: 2022-01-27

## 2022-01-27 ENCOUNTER — HOME CARE VISIT (OUTPATIENT)
Dept: HOME HEALTH SERVICES | Facility: CLINIC | Age: 42
End: 2022-01-27

## 2022-01-27 ENCOUNTER — TELEPHONE (OUTPATIENT)
Dept: PRIMARY CARE CLINIC | Age: 42
End: 2022-01-27

## 2022-01-27 VITALS
SYSTOLIC BLOOD PRESSURE: 95 MMHG | TEMPERATURE: 97.2 F | HEART RATE: 63 BPM | OXYGEN SATURATION: 98 % | DIASTOLIC BLOOD PRESSURE: 76 MMHG | RESPIRATION RATE: 16 BRPM

## 2022-01-27 VITALS
DIASTOLIC BLOOD PRESSURE: 76 MMHG | RESPIRATION RATE: 20 BRPM | HEART RATE: 63 BPM | OXYGEN SATURATION: 98 % | SYSTOLIC BLOOD PRESSURE: 95 MMHG | TEMPERATURE: 60.8 F

## 2022-01-27 PROCEDURE — G0299 HHS/HOSPICE OF RN EA 15 MIN: HCPCS

## 2022-01-27 PROCEDURE — G0152 HHCP-SERV OF OT,EA 15 MIN: HCPCS

## 2022-01-27 NOTE — HOME HEALTH
REASON FOR REFERRAL: Patient referred for skilled OT following hospitalization due to Covid pneumonia.  PRIMARY DIAGNOSIS: COVID-19  SECONDARY DIAGNOSIS: Down syndrome (non-verbal), gout    SUBJECTIVE: Patient's father reports patient took off the O2 and pointed to his nose last night.  Patient's father reports patient has had blood in mucus when nose has been blown.    FALL PREVENTION EDUCATION: Patient verbalizes understanding of fall prevention education including recommendation of a grab bar by the tub/shower combination.    ASSISTIVE DEVICE/DME: home O2, cane; recommended a shower seat for energy conservation and grab bar by the tub/shower combination.    MEDICAL NECESSITY:  Skilled OT evaluation is reasonable and medically necessary to assess safety and independence with ADL tasks; patient's father education regarding DME to increase safety and independence with ADL/mobility.    TODAY'S INTERVENTIONS: OT evaluation, including upper body strength testing, activity tolerance testing, dressing, ADL transfers, patient education regarding benefit of shower seat and grab bar for the tub/shower combination.  ASSESSMENT OF ONGOING NEED FOR SKILLED OT: The patient was assessed using clinical observation, strength measurements, and endurance tolerance.  OT evaluation completed.  OT evaluation only at this time.  Patient's father will assist patient as needed with ADL.    DATE OF NEXT APPOINTMENT WITH DOCTOR: 2/2/22  ANTICIPATED DISCHARGE PLAN:  To family  PATIENT / CAREGIVER AGREE WITH DISCHARGE PLAN: Yes  COMMUNICATION / CARE COORDINATION: DOMINGO Abdi regarding scheduling; O2 company regarding humidifier for O2 concentrator

## 2022-01-27 NOTE — TELEPHONE ENCOUNTER
If patient is ambulatory and able to go to the restroom with minimal assistance it would be okay to d/c.

## 2022-01-27 NOTE — TELEPHONE ENCOUNTER
Lidia at [facility] called and wants to know when pt guaman should be removed? Pt has appt on 2/2/2022 they wanted to know if you wanted it removed before you see him.  She can be reached at [phone]

## 2022-01-27 NOTE — HOME HEALTH
42 yo male dx Covid -19 .Pt hospitalized per dx with generalized weakness SOB   ( 02 and ac new since dx )     Pmhx :Downs ( nonverbal ) - gout   PLOF : household - community amb   Easter 5 days week   Home environment : lives with family - 6 step entry  DME needs : na  Homebound status assessment : Pt presents with altered gait , balance , strength and endurance per dx

## 2022-01-27 NOTE — OUTREACH NOTE
COVID-19 Week 2 Survey      Responses   [facility] patient discharged from? [place]   Does the patient have one of the following disease processes/diagnoses(primary or secondary)? COVID-19   COVID-19 underlying condition? None   Call Number Call 1   COVID-19 Week 2: Call 1 attempt successful? Yes   Call start time 1246   Call end time 1250   General alerts for this patient Pt has Down syndrome   Discharge diagnosis Cytokine release syndrome, grade Pneumonia due to COVID-19 virus   Person spoke with today (if not patient) and relationship father   Meds reviewed with patient/caregiver? Yes   Is the patient having any side effects they believe may be caused by any medication additions or changes? No   Is the patient taking all medications as directed (includes completed medication regime)? Yes   Does the patient have a primary care provider?  Yes   Comments regarding PCP PCP appt on 2/2/22   Does the patient have an appointment with their PCP or specialist within 7 days of discharge? Greater than 7 days   What is preventing the patient from scheduling follow up appointments within 7 days of discharge? Earlier appointment not available   Nursing Interventions Verified appointment date/time/provider   Has the patient kept scheduled appointments due by today? N/A   What is the Home health agency?   [facility]   Has home health visited the patient within 72 hours of discharge? Yes   What DME was ordered? [employer]   Has all DME been delivered? Yes   Psychosocial issues? No   What is the patient's perception of their health status since discharge? Improving   Does the patient have any of the following symptoms? None   Nursing Interventions Nurse provided patient education   Pulse Ox monitoring Intermittent   Pulse Ox device source Hospital   O2 Sat comments % on 3LO2   O2 Sat: education provided Sat levels,  Monitoring frequency   Is the patient/caregiver able to teach back steps to recovery at home? Set small,  achievable goals for return to baseline health,  Rest and rebuild strength, gradually increase activity,  Eat a well-balance diet   Is the patient/caregiver able to teach back the hierarchy of who to call/visit for symptoms/problems? PCP, Specialist, Home health nurse, Urgent Care, ED, 911 Yes   COVID-19 call completed? Yes          MARGARITA MOORE RN

## 2022-01-28 ENCOUNTER — HOME CARE VISIT (OUTPATIENT)
Dept: HOME HEALTH SERVICES | Facility: CLINIC | Age: 42
End: 2022-01-28

## 2022-01-28 ENCOUNTER — HOME CARE VISIT (OUTPATIENT)
Dept: HOME HEALTH SERVICES | Facility: HOME HEALTHCARE | Age: 42
End: 2022-01-28

## 2022-01-28 VITALS — OXYGEN SATURATION: 99 % | RESPIRATION RATE: 18 BRPM | TEMPERATURE: 97.3 F | HEART RATE: 83 BPM

## 2022-01-28 PROCEDURE — G0157 HHC PT ASSISTANT EA 15: HCPCS

## 2022-01-28 NOTE — HOME HEALTH
Pt's family reports no new problems for pt today. Pt reports no falls, med changes, or insurance changes since the previous visit. Continue progressive gait training and ther ex at the next visit.

## 2022-01-28 NOTE — TELEPHONE ENCOUNTER
I attempted to reach [facility] and left detailed message on VM instructed her to call us back with any other questions.

## 2022-01-28 NOTE — HOME HEALTH
Cg educated on oxygen safety and precautions, bleeding precautions and the importance of er follow up should pt have a fall and hit his head, complete medication review. Merino cath care instructed. Pt has an md appt on 2/4 with PCP and states will address this at that appt since we have had not had a follow up return call after several attempts by 2 nurses. Pt is tolerating merino cath well. Merino is patent and draining clear yellow urine. O2 on at 3 lpm/nc and pt does not appear to be in any distress. Rest of assess wnl. Covid precautions, s/sx to report and home management educated along with pulmonary hygiene expectations. CG verb understanding of all teaching and can repeat back all education.

## 2022-01-31 ENCOUNTER — HOME CARE VISIT (OUTPATIENT)
Dept: HOME HEALTH SERVICES | Facility: CLINIC | Age: 42
End: 2022-01-31

## 2022-01-31 VITALS
DIASTOLIC BLOOD PRESSURE: 69 MMHG | TEMPERATURE: 97.9 F | HEART RATE: 78 BPM | RESPIRATION RATE: 20 BRPM | OXYGEN SATURATION: 99 % | SYSTOLIC BLOOD PRESSURE: 110 MMHG

## 2022-01-31 PROCEDURE — G0300 HHS/HOSPICE OF LPN EA 15 MIN: HCPCS

## 2022-02-01 ENCOUNTER — HOME CARE VISIT (OUTPATIENT)
Dept: HOME HEALTH SERVICES | Facility: CLINIC | Age: 42
End: 2022-02-01

## 2022-02-01 ENCOUNTER — HOME CARE VISIT (OUTPATIENT)
Dept: HOME HEALTH SERVICES | Facility: HOME HEALTHCARE | Age: 42
End: 2022-02-01

## 2022-02-01 VITALS
SYSTOLIC BLOOD PRESSURE: 102 MMHG | RESPIRATION RATE: 20 BRPM | TEMPERATURE: 98 F | HEART RATE: 86 BPM | OXYGEN SATURATION: 98 % | DIASTOLIC BLOOD PRESSURE: 70 MMHG

## 2022-02-01 PROCEDURE — G0157 HHC PT ASSISTANT EA 15: HCPCS

## 2022-02-01 NOTE — HOME HEALTH
Pt's father reports no new problems. Pt's father reports no falls, med changes, or insurance changes since the previous visit. Continue progressive gait training and ther ex at the next visit.

## 2022-02-02 ENCOUNTER — OFFICE VISIT (OUTPATIENT)
Dept: PRIMARY CARE CLINIC | Age: 42
End: 2022-02-02
Payer: MEDICAID

## 2022-02-02 VITALS
HEART RATE: 88 BPM | DIASTOLIC BLOOD PRESSURE: 68 MMHG | SYSTOLIC BLOOD PRESSURE: 118 MMHG | BODY MASS INDEX: 43.65 KG/M2 | WEIGHT: 262 LBS | TEMPERATURE: 97.9 F | OXYGEN SATURATION: 98 % | HEIGHT: 65 IN

## 2022-02-02 DIAGNOSIS — J18.9 PNEUMONIA OF BOTH LUNGS DUE TO INFECTIOUS ORGANISM, UNSPECIFIED PART OF LUNG: Primary | ICD-10-CM

## 2022-02-02 DIAGNOSIS — L30.9 ECZEMA, UNSPECIFIED TYPE: ICD-10-CM

## 2022-02-02 PROCEDURE — 99215 OFFICE O/P EST HI 40 MIN: CPT | Performed by: NURSE PRACTITIONER

## 2022-02-02 RX ORDER — GUAIFENESIN 600 MG/1
600 TABLET, EXTENDED RELEASE ORAL EVERY 12 HOURS SCHEDULED
COMMUNITY
Start: 2022-01-21 | End: 2022-06-13

## 2022-02-02 RX ORDER — TAMSULOSIN HYDROCHLORIDE 0.4 MG/1
0.4 CAPSULE ORAL DAILY
COMMUNITY
Start: 2022-01-22 | End: 2022-04-18

## 2022-02-02 RX ORDER — DEXTROMETHORPHAN POLISTIREX 30 MG/5ML
60 SUSPENSION ORAL EVERY 12 HOURS PRN
COMMUNITY
Start: 2022-01-21 | End: 2022-06-13

## 2022-02-02 RX ORDER — ALBUTEROL SULFATE 90 UG/1
2 AEROSOL, METERED RESPIRATORY (INHALATION) 4 TIMES DAILY
COMMUNITY

## 2022-02-02 RX ORDER — CEFDINIR 300 MG/1
300 CAPSULE ORAL 2 TIMES DAILY
COMMUNITY
End: 2022-06-13

## 2022-02-02 RX ORDER — FAMOTIDINE 20 MG/1
20 TABLET, FILM COATED ORAL 2 TIMES DAILY PRN
COMMUNITY
Start: 2022-01-21 | End: 2022-05-09

## 2022-02-02 RX ORDER — ASCORBIC ACID 500 MG
500 TABLET ORAL DAILY
COMMUNITY

## 2022-02-02 RX ORDER — ZINC SULFATE 50(220)MG
220 CAPSULE ORAL DAILY
COMMUNITY
Start: 2022-01-22

## 2022-02-02 RX ORDER — DEXAMETHASONE 6 MG/1
6 TABLET ORAL DAILY
COMMUNITY
End: 2022-06-13

## 2022-02-02 RX ORDER — AMMONIUM LACTATE 12 G/100G
LOTION TOPICAL
Qty: 400 G | Refills: 11 | Status: SHIPPED | OUTPATIENT
Start: 2022-02-02

## 2022-02-02 RX ORDER — BUDESONIDE AND FORMOTEROL FUMARATE DIHYDRATE 160; 4.5 UG/1; UG/1
2 AEROSOL RESPIRATORY (INHALATION)
COMMUNITY
Start: 2022-01-21 | End: 2022-06-13 | Stop reason: SDUPTHER

## 2022-02-02 RX ORDER — BENZONATATE 100 MG/1
100 CAPSULE ORAL 3 TIMES DAILY PRN
COMMUNITY
End: 2022-05-13

## 2022-02-02 RX ORDER — DEXAMETHASONE 6 MG/1
6 TABLET ORAL DAILY
Status: CANCELLED | OUTPATIENT
Start: 2022-02-02

## 2022-02-02 SDOH — ECONOMIC STABILITY: FOOD INSECURITY: WITHIN THE PAST 12 MONTHS, THE FOOD YOU BOUGHT JUST DIDN'T LAST AND YOU DIDN'T HAVE MONEY TO GET MORE.: NEVER TRUE

## 2022-02-02 SDOH — ECONOMIC STABILITY: FOOD INSECURITY: WITHIN THE PAST 12 MONTHS, YOU WORRIED THAT YOUR FOOD WOULD RUN OUT BEFORE YOU GOT MONEY TO BUY MORE.: NEVER TRUE

## 2022-02-02 ASSESSMENT — ENCOUNTER SYMPTOMS
RHINORRHEA: 0
NAUSEA: 0
BACK PAIN: 0
PHOTOPHOBIA: 0
VOMITING: 0
COUGH: 0
COLOR CHANGE: 0
SHORTNESS OF BREATH: 0
VOICE CHANGE: 0

## 2022-02-02 ASSESSMENT — SOCIAL DETERMINANTS OF HEALTH (SDOH): HOW HARD IS IT FOR YOU TO PAY FOR THE VERY BASICS LIKE FOOD, HOUSING, MEDICAL CARE, AND HEATING?: NOT HARD AT ALL

## 2022-02-02 NOTE — PROGRESS NOTES
[address] [facility] CARE  [address] [address]  [address] [place] [address]  Dept: [phone]  Dept Fax: [phone]  Loc: [phone]    Herminia Harry is a 37 y.o. male who presents today for his medical conditions/complaints as noted below. Herminia Harry is c/o of Follow-up (post covid , father states Therapy nurse wanted to check and see if he can turn Qx 2lt ) and Other (father states [facility] took out his catheter 02/1)        HPI:     HPI   Chief Complaint   Patient presents with    Follow-up     post covid , father states Therapy nurse wanted to check and see if he can turn Qx 2lt     Other     father states [facility] took out his catheter 02/1     Patient presents today for hospital follow-up. (This visit does not qualify for TCM; no phone call within 48 hours made). Patient was admitted to [facility] for acute respiratory failure and bilateral pneumonia r/t covid19. He was discharged on 3L O2 and guaman catheter;  has removed catheter and he is voiding well on his own. Patient has completed antibiotics and steroids he was given at discharge. Per his father he is doing well at home. No fever or shortness of breath.  has been coming to do therapy.      Past Medical History:   Diagnosis Date    Back stiffness     Down's syndrome     Osteoarthritis     Stiffness in joint     bilateral legs      Past Surgical History:   Procedure Laterality Date    CARDIAC SURGERY      septal repair       Vitals 2/2/2022 12/15/2021 7/1/2021 1/12/2021 1/6/2020 2/61/1413   SYSTOLIC 622 [phone]   DIASTOLIC 68 74 82 70 68 70   Site Left Upper Arm - - - - -   Position Sitting - - - - -   Cuff Size - - - - - -   Pulse 88 88 82 111 88 70   Temp 97.9 97.4 97 97.3 98 97.2   Resp - - 18 20 - -   SpO2 98 98 98 100 98 98   Weight 262 lb 275 lb 3.2 oz 265 lb 267 lb 256 lb 258 lb   Height 5' 5\" 5' 5\" 5' 5\" 5' 3\" 5' 1\" 5' 0\"   Body mass index 43.59 kg/m2 45.79 kg/m2 44.09 kg/m2 47.29 kg/m2 48.37 kg/m2 50.38 kg/m2   Some recent data might be hidden       Family History   Problem Relation Age of Onset    Other Father         gout    High Blood Pressure Mother        Social History     Tobacco Use    Smoking status: Never Smoker    Smokeless tobacco: Never Used   Substance Use Topics    Alcohol use: No      Current Outpatient Medications on File Prior to Visit   Medication Sig Dispense Refill    cefdinir (OMNICEF) 300 MG capsule Take 300 mg by mouth 2 times daily For 4 days      albuterol sulfate  (90 Base) MCG/ACT inhaler Inhale 2 puffs into the lungs 4 times daily      vitamin C (ASCORBIC ACID) 500 MG tablet Take 500 mg by mouth daily      benzonatate (TESSALON) 100 MG capsule Take 100 mg by mouth 3 times daily as needed      budesonide-formoterol (SYMBICORT) 160-4.5 MCG/ACT AERO Inhale 2 puffs into the lungs Twice daily      vitamin D 25 MCG (1000 UT) CAPS Take 2,000 Units by mouth daily      dextromethorphan (DELSYM) 30 MG/5ML extended release liquid Take 60 mg by mouth every 12 hours as needed      famotidine (PEPCID) 20 MG tablet Take 20 mg by mouth 2 times daily as needed      guaiFENesin (MUCINEX) 600 MG extended release tablet Take 600 mg by mouth every 12 hours      tamsulosin (FLOMAX) 0.4 MG capsule Take 0.4 mg by mouth daily      zinc sulfate (ZINCATE) 220 (50 Zn) MG capsule Take 220 mg by mouth daily      dexamethasone (DECADRON) 6 MG tablet Take 6 mg by mouth daily For 5 days      allopurinol (ZYLOPRIM) 300 MG tablet Take 1 tablet by mouth daily 30 tablet 1    indomethacin (INDOCIN) 25 MG capsule Take 1-2 capsules by mouth three times daily with food as needed for inflammation and pain. 60 capsule 1     No current facility-administered medications on file prior to visit.      Allergies   Allergen Reactions    Seasonal        Health Maintenance   Topic Date Due    Hepatitis C screen  Never done    Varicella vaccine (1 of 2 - 2-dose childhood series) Never done    Flu vaccine (1) 09/01/2021    COVID-19 Vaccine (2 - Booster for Radha series) 09/04/2021    A1C test (Diabetic or Prediabetic)  01/12/2022    Depression Screen  12/15/2022    Lipid screen  01/12/2026    DTaP/Tdap/Td vaccine (2 - Td or Tdap) 03/08/2027    HIV screen  Completed    Hepatitis A vaccine  Aged Out    Hepatitis B vaccine  Aged Out    Hib vaccine  Aged Out    Meningococcal (ACWY) vaccine  Aged Out    Pneumococcal 0-64 years Vaccine  Aged Out       Subjective:      Review of Systems   Constitutional: Negative for chills and fever. HENT: Negative for ear pain, hearing loss, rhinorrhea and voice change. Eyes: Negative for photophobia and visual disturbance. Respiratory: Negative for cough and shortness of breath. Cardiovascular: Negative for chest pain and palpitations. Gastrointestinal: Negative for nausea and vomiting. Endocrine: Negative. Negative for cold intolerance and heat intolerance. Genitourinary: Negative for difficulty urinating and flank pain. Musculoskeletal: Negative for back pain and neck pain. Skin: Negative for color change and rash. Allergic/Immunologic: Negative for environmental allergies and food allergies. Neurological: Negative for dizziness, speech difficulty and headaches. Hematological: Does not bruise/bleed easily. Psychiatric/Behavioral: Negative for sleep disturbance and suicidal ideas. Objective:     Physical Exam  Vitals and nursing note reviewed. Constitutional:       Appearance: He is well-developed. HENT:      Head: Atraumatic. Right Ear: External ear normal.      Left Ear: External ear normal.      Nose: Nose normal.   Eyes:      Conjunctiva/sclera: Conjunctivae normal.      Pupils: Pupils are equal, round, and reactive to light. Cardiovascular:      Rate and Rhythm: Normal rate and regular rhythm.       Heart sounds: Normal heart sounds, S1 normal and S2 normal.   Pulmonary:      Effort: Pulmonary effort is normal.      Breath sounds: Normal breath sounds. Abdominal:      General: Bowel sounds are normal.      Palpations: Abdomen is soft. Musculoskeletal:         General: Normal range of motion. Cervical back: Normal range of motion and neck supple. Skin:     General: Skin is warm and dry. Neurological:      Mental Status: He is alert and oriented to person, place, and time. Psychiatric:         Behavior: Behavior normal.       /68 (Site: Left Upper Arm, Position: Sitting)   Pulse 88   Temp 97.9 °F (36.6 °C) (Temporal)   Ht 5' 5\" (1.651 m)   Wt 262 lb (118.8 kg)   SpO2 98%   BMI 43.60 kg/m²     Assessment:       Diagnosis Orders   1. Pneumonia of both lungs due to infectious organism, unspecified part of lung  XR CHEST STANDARD (2 VW)   2. Eczema, unspecified type  ammonium lactate (LAC-HYDRIN) 12 % lotion         Plan:   More than 50% of the time was spent counseling and coordinating care for a total time of 40 min face to face. Decrease O2 to 2L; discussed weaning him off over the next few weeks. Will recheck CXR in 3 weeks to ensure resolution of pneumonia. Follow-up if not improving. PDMP Monitoring:    Last PDMP [place] as Reviewed [facility]):  Review User Review Instant Review Result            Urine Drug Screenings (1 yr)    No resulted procedures found. Medication Contract and Consent for Opioid Use Documents Filed      No documents found                 Patient given educational materials -see patient instructions. Discussed use, benefit, and side effects of prescribed medications. All patient questions answered. Pt voiced understanding. Reviewed health maintenance. Instructed to continue currentmedications, diet and exercise. Patient agreed with treatment plan. Follow up as directed. MEDICATIONS:  Orders Placed This Encounter   Medications    ammonium lactate (LAC-HYDRIN) 12 % lotion     Sig: Apply topically to affected area(s) daily.      Dispense:  400 g     Refill:  11 ORDERS:  Orders Placed This Encounter   Procedures    XR CHEST STANDARD (2 VW)       Follow-up:  Return if symptoms worsen or fail to improve. PATIENT INSTRUCTIONS:  There are no Patient Instructions on file for this visit. Electronically signed by VANESA Drummond on 2/2/2022 at 2:46 PM    EMR Dragon/transcription disclaimer:  Much of thisencounter note is electronic transcription/translation of spoken language to printed texts. The electronic translation of spoken language may be erroneous, or at times, nonsensical words or phrases may be inadvertentlytranscribed.   Although I have reviewed the note for such errors, some may still exist.

## 2022-02-02 NOTE — HOME HEALTH
MONACO CATH REMOVED PER MD ORDERS, 10 CC BULB DEFLATED PT [place] PANCHO CARE GIVEN PT [place],600 CC CLEAR PALE YELLOW URINE IN MONACO BAG,  PT IS WEARING O2 PER NC AT 3 LMP, PTS CG DENIES ANY ISSUES AT THIS TIME.    1930 - CALLED PTS CG TO CHECK IF PT HAS VOIDED, CG STATES PT WAS ABLE TO VOID 3 HOURS AFTER MONACO REMOVAL, 300 CC CLEAR YELLOW URINE

## 2022-02-03 ENCOUNTER — HOME CARE VISIT (OUTPATIENT)
Dept: HOME HEALTH SERVICES | Facility: CLINIC | Age: 42
End: 2022-02-03

## 2022-02-03 ENCOUNTER — HOME CARE VISIT (OUTPATIENT)
Dept: HOME HEALTH SERVICES | Facility: HOME HEALTHCARE | Age: 42
End: 2022-02-03

## 2022-02-04 ENCOUNTER — READMISSION MANAGEMENT (OUTPATIENT)
Dept: CALL CENTER | Facility: HOSPITAL | Age: 42
End: 2022-02-04

## 2022-02-04 NOTE — OUTREACH NOTE
COVID-19 Week 3 Survey      Responses   [facility] patient discharged from? [place]   Does the patient have one of the following disease processes/diagnoses(primary or secondary)? COVID-19   COVID-19 underlying condition? None   Call Number Call 1   COVID-19 Week 3: Call 1 attempt successful? Yes   Call start time 1412   Call end time 1413   General alerts for this patient Pt has Down syndrome   Discharge diagnosis Cytokine release syndrome, grade Pneumonia due to COVID-19 virus   Is patient permission given to speak with other caregiver? Yes   List who call center can speak with father- Kevin   Person spoke with today (if not patient) and relationship father   Is the patient taking all medications as directed (includes completed medication regime)? Yes   Does the patient have a primary care provider?  Yes   Comments regarding PCP saw PCP on 2/2   Has the patient kept scheduled appointments due by today? Yes   What is the Home health agency?  [facility]   Home health comments Home health is still seeing patient   What is the patient's perception of their health status since discharge? Improving   Is the patient/caregiver able to teach back the hierarchy of who to call/visit for symptoms/problems? PCP, Specialist, Home health nurse, Urgent Care, ED, 911 Yes   COVID-19 call completed? Yes   Wrap up additional comments Per father, patient is doing well, no questions.          Fernanda Camilo RN

## 2022-02-04 NOTE — HOME HEALTH
Visit from [facility] was cancelled on 02/3/2022 due to travel conditions.     Patient contacted by phone and the following items were addressed:  -Caregiver available to provide care as needed.  -Adequate supplies in the home (e.g. food, meds)  -Patient has heat/electricity in the home    For your records only.  As per home health protocol, MD must be notified of missed/cancelled visits; therefore the prescribed frequency was not met.

## 2022-02-07 ENCOUNTER — HOME CARE VISIT (OUTPATIENT)
Dept: HOME HEALTH SERVICES | Facility: CLINIC | Age: 42
End: 2022-02-07

## 2022-02-07 PROCEDURE — G0300 HHS/HOSPICE OF LPN EA 15 MIN: HCPCS

## 2022-02-08 ENCOUNTER — HOME CARE VISIT (OUTPATIENT)
Dept: HOME HEALTH SERVICES | Facility: CLINIC | Age: 42
End: 2022-02-08

## 2022-02-08 VITALS — HEART RATE: 95 BPM | RESPIRATION RATE: 18 BRPM | TEMPERATURE: 97.2 F | OXYGEN SATURATION: 97 %

## 2022-02-08 VITALS
TEMPERATURE: 97.9 F | RESPIRATION RATE: 20 BRPM | SYSTOLIC BLOOD PRESSURE: 120 MMHG | DIASTOLIC BLOOD PRESSURE: 77 MMHG | OXYGEN SATURATION: 99 % | HEART RATE: 87 BPM

## 2022-02-08 PROCEDURE — G0157 HHC PT ASSISTANT EA 15: HCPCS

## 2022-02-08 NOTE — HOME HEALTH
Pt's father says that pt has been doing well. Pt's father reports no falls, med changes, or insurance changes since the previous visit.

## 2022-02-11 ENCOUNTER — READMISSION MANAGEMENT (OUTPATIENT)
Dept: CALL CENTER | Facility: HOSPITAL | Age: 42
End: 2022-02-11

## 2022-02-11 NOTE — OUTREACH NOTE
"COVID-19 Week 4 Survey      Responses   [facility] patient discharged from? [place]   Does the patient have one of the following disease processes/diagnoses(primary or secondary)? COVID-19   COVID-19 underlying condition? None   Call Number Call 1   COVID-19 Week 4: Call 1 attempt successful? Yes   Call start time 1605   Call end time 1607   General alerts for this patient Pt has Down syndrome   Discharge diagnosis Cytokine release syndrome, grade Pneumonia due to COVID-19 virus   Is patient permission given to speak with other caregiver? Yes   List who call center can speak with father- Kevin   Person spoke with today (if not patient) and relationship father   Is the patient taking all medications as directed (includes completed medication regime)? Yes   Has the patient kept scheduled appointments due by today? Yes   Is the patient still receiving Home Health Services? Yes   What is the patient's perception of their health status since discharge? Improving   Pulse Ox monitoring Intermittent   Pulse Ox device source Hospital   O2 Sat comments \"registering good\" on 2L   O2 Sat: education provided Sat levels,  When to seek care   O2 Sat education comments <90% seek urgent care   Is the patient/caregiver able to teach back steps to recovery at home? Rest and rebuild strength, gradually increase activity   Is the patient/caregiver able to teach back the hierarchy of who to call/visit for symptoms/problems? PCP, Specialist, Home health nurse, Urgent Care, ED, 911 Yes   Is the patient interested in additional calls from an ambulatory ?  NOTE:  applies to high risk patients requiring additional follow-up. No   Did the patient feel the follow up calls were helpful during their recovery period? Yes   Was the number of calls appropriate? Yes   Wrap up additional comments Per father, patient is doing well, no questions. Pt still on oxg=tgen but having no breathing difficulties.           Asuncion Cao, " RN

## 2022-02-18 ENCOUNTER — HOME CARE VISIT (OUTPATIENT)
Dept: HOME HEALTH SERVICES | Facility: CLINIC | Age: 42
End: 2022-02-18

## 2022-02-18 PROCEDURE — G0300 HHS/HOSPICE OF LPN EA 15 MIN: HCPCS

## 2022-02-21 VITALS
SYSTOLIC BLOOD PRESSURE: 128 MMHG | DIASTOLIC BLOOD PRESSURE: 74 MMHG | RESPIRATION RATE: 20 BRPM | OXYGEN SATURATION: 99 % | TEMPERATURE: 97.9 F | HEART RATE: 78 BPM

## 2022-02-25 ENCOUNTER — HOME CARE VISIT (OUTPATIENT)
Dept: HOME HEALTH SERVICES | Facility: CLINIC | Age: 42
End: 2022-02-25

## 2022-02-25 PROCEDURE — G0300 HHS/HOSPICE OF LPN EA 15 MIN: HCPCS

## 2022-02-28 VITALS
SYSTOLIC BLOOD PRESSURE: 132 MMHG | OXYGEN SATURATION: 95 % | HEART RATE: 87 BPM | DIASTOLIC BLOOD PRESSURE: 78 MMHG | TEMPERATURE: 97.9 F | RESPIRATION RATE: 20 BRPM

## 2022-03-04 ENCOUNTER — HOME CARE VISIT (OUTPATIENT)
Dept: HOME HEALTH SERVICES | Facility: CLINIC | Age: 42
End: 2022-03-04

## 2022-03-04 VITALS
TEMPERATURE: 97.1 F | SYSTOLIC BLOOD PRESSURE: 110 MMHG | OXYGEN SATURATION: 98 % | DIASTOLIC BLOOD PRESSURE: 62 MMHG | RESPIRATION RATE: 18 BRPM | HEART RATE: 86 BPM

## 2022-03-04 PROCEDURE — G0299 HHS/HOSPICE OF RN EA 15 MIN: HCPCS

## 2022-03-04 NOTE — HOME HEALTH
FOCUS OF CARE/SKILLED NEED: DC FROM SERVICES IF GOALS MET.     TEACHING/INTERVENTIONS:  COMPLETE MEDICATION REVIEW.     PROGRESS TOWARD GOALS: ALL GOALS MET    PHYSICIAN CONTACT:  DC FROM SERVICES THIS DATE    INSURANCE CHANGES? DENIES    FALLS SINCE LAST VISIT? DENIES    MEDICATION CHANGES SINCE LAST VISIT? DENIES    PLAN FOR NEXT VISIT: N/A

## 2022-04-04 DIAGNOSIS — M10.9 GOUT OF BIG TOE: ICD-10-CM

## 2022-04-04 RX ORDER — ALLOPURINOL 300 MG/1
TABLET ORAL
Qty: 30 TABLET | Refills: 1 | Status: SHIPPED | OUTPATIENT
Start: 2022-04-04 | End: 2022-06-03

## 2022-04-18 RX ORDER — TAMSULOSIN HYDROCHLORIDE 0.4 MG/1
CAPSULE ORAL
Qty: 90 CAPSULE | Refills: 0 | Status: SHIPPED | OUTPATIENT
Start: 2022-04-18 | End: 2022-06-13

## 2022-05-09 RX ORDER — FAMOTIDINE 20 MG/1
TABLET, FILM COATED ORAL
Qty: 60 TABLET | Refills: 2 | Status: SHIPPED | OUTPATIENT
Start: 2022-05-09 | End: 2022-06-13

## 2022-05-13 RX ORDER — BENZONATATE 100 MG/1
CAPSULE ORAL
Qty: 30 CAPSULE | Refills: 2 | Status: SHIPPED | OUTPATIENT
Start: 2022-05-13 | End: 2022-10-24 | Stop reason: ALTCHOICE

## 2022-05-20 ENCOUNTER — TELEPHONE (OUTPATIENT)
Dept: PRIMARY CARE CLINIC | Age: 42
End: 2022-05-20

## 2022-05-20 NOTE — TELEPHONE ENCOUNTER
Dad called In to see where the paperwork for his sons oxygen machine was he stated he is not having to use it as much but the company is needing the forms returned so they wont bill the PT i asked for him to call back on Monday and have a fax # where we can resend the paperwork if they still have not received it l.

## 2022-06-03 DIAGNOSIS — M10.9 GOUT OF BIG TOE: ICD-10-CM

## 2022-06-03 RX ORDER — ALLOPURINOL 300 MG/1
TABLET ORAL
Qty: 30 TABLET | Refills: 1 | Status: SHIPPED | OUTPATIENT
Start: 2022-06-03 | End: 2022-08-15

## 2022-06-13 ENCOUNTER — OFFICE VISIT (OUTPATIENT)
Dept: PRIMARY CARE CLINIC | Age: 42
End: 2022-06-13
Payer: MEDICAID

## 2022-06-13 VITALS
WEIGHT: 258 LBS | HEIGHT: 65 IN | TEMPERATURE: 96.8 F | SYSTOLIC BLOOD PRESSURE: 118 MMHG | OXYGEN SATURATION: 81 % | BODY MASS INDEX: 42.99 KG/M2 | HEART RATE: 100 BPM | DIASTOLIC BLOOD PRESSURE: 70 MMHG

## 2022-06-13 DIAGNOSIS — Q90.9 DOWN'S SYNDROME: Primary | Chronic | ICD-10-CM

## 2022-06-13 DIAGNOSIS — M10.9 GOUT OF BIG TOE: ICD-10-CM

## 2022-06-13 DIAGNOSIS — Z13.1 SCREENING FOR DIABETES MELLITUS: ICD-10-CM

## 2022-06-13 DIAGNOSIS — Q90.9 DOWN'S SYNDROME: Chronic | ICD-10-CM

## 2022-06-13 LAB
ALBUMIN SERPL-MCNC: 3.8 G/DL (ref 3.5–5.2)
ALP BLD-CCNC: 120 U/L (ref 40–130)
ALT SERPL-CCNC: 18 U/L (ref 5–41)
ANION GAP SERPL CALCULATED.3IONS-SCNC: 16 MMOL/L (ref 7–19)
AST SERPL-CCNC: 22 U/L (ref 5–40)
BASOPHILS ABSOLUTE: 0.1 K/UL (ref 0–0.2)
BASOPHILS RELATIVE PERCENT: 1.5 % (ref 0–1)
BILIRUB SERPL-MCNC: 0.3 MG/DL (ref 0.2–1.2)
BUN BLDV-MCNC: 24 MG/DL (ref 6–20)
CALCIUM SERPL-MCNC: 9.4 MG/DL (ref 8.6–10)
CHLORIDE BLD-SCNC: 103 MMOL/L (ref 98–111)
CHOLESTEROL, TOTAL: 255 MG/DL (ref 160–199)
CO2: 21 MMOL/L (ref 22–29)
CREAT SERPL-MCNC: 1.1 MG/DL (ref 0.5–1.2)
EOSINOPHILS ABSOLUTE: 0 K/UL (ref 0–0.6)
EOSINOPHILS RELATIVE PERCENT: 0.7 % (ref 0–5)
GFR AFRICAN AMERICAN: >59
GFR NON-AFRICAN AMERICAN: >60
GLUCOSE BLD-MCNC: 98 MG/DL (ref 74–109)
HBA1C MFR BLD: 5.6 %
HBA1C MFR BLD: 6 % (ref 4–6)
HCT VFR BLD CALC: 41.1 % (ref 42–52)
HDLC SERPL-MCNC: 63 MG/DL (ref 55–121)
HEMOGLOBIN: 13.7 G/DL (ref 14–18)
IMMATURE GRANULOCYTES #: 0 K/UL
LDL CHOLESTEROL CALCULATED: 171 MG/DL
LYMPHOCYTES ABSOLUTE: 1.4 K/UL (ref 1.1–4.5)
LYMPHOCYTES RELATIVE PERCENT: 35 % (ref 20–40)
MCH RBC QN AUTO: 30.5 PG (ref 27–31)
MCHC RBC AUTO-ENTMCNC: 33.3 G/DL (ref 33–37)
MCV RBC AUTO: 91.5 FL (ref 80–94)
MONOCYTES ABSOLUTE: 0.5 K/UL (ref 0–0.9)
MONOCYTES RELATIVE PERCENT: 13.1 % (ref 0–10)
NEUTROPHILS ABSOLUTE: 2 K/UL (ref 1.5–7.5)
NEUTROPHILS RELATIVE PERCENT: 49.5 % (ref 50–65)
PDW BLD-RTO: 14.9 % (ref 11.5–14.5)
PLATELET # BLD: 341 K/UL (ref 130–400)
PMV BLD AUTO: 9.5 FL (ref 9.4–12.4)
POTASSIUM SERPL-SCNC: 4.9 MMOL/L (ref 3.5–5)
RBC # BLD: 4.49 M/UL (ref 4.7–6.1)
SODIUM BLD-SCNC: 140 MMOL/L (ref 136–145)
TOTAL PROTEIN: 7.8 G/DL (ref 6.6–8.7)
TRIGL SERPL-MCNC: 107 MG/DL (ref 0–149)
TSH SERPL DL<=0.05 MIU/L-ACNC: 2.11 UIU/ML (ref 0.27–4.2)
URIC ACID, SERUM: 10.1 MG/DL (ref 3.4–7)
WBC # BLD: 4.1 K/UL (ref 4.8–10.8)

## 2022-06-13 PROCEDURE — 99214 OFFICE O/P EST MOD 30 MIN: CPT | Performed by: NURSE PRACTITIONER

## 2022-06-13 PROCEDURE — 83036 HEMOGLOBIN GLYCOSYLATED A1C: CPT | Performed by: NURSE PRACTITIONER

## 2022-06-13 RX ORDER — BUDESONIDE AND FORMOTEROL FUMARATE DIHYDRATE 160; 4.5 UG/1; UG/1
2 AEROSOL RESPIRATORY (INHALATION) 2 TIMES DAILY
Qty: 10.2 G | Refills: 2 | Status: SHIPPED | OUTPATIENT
Start: 2022-06-13

## 2022-06-13 ASSESSMENT — ENCOUNTER SYMPTOMS
SHORTNESS OF BREATH: 0
COUGH: 0
COLOR CHANGE: 0
RHINORRHEA: 0
BACK PAIN: 0
NAUSEA: 0
PHOTOPHOBIA: 0
VOICE CHANGE: 0
VOMITING: 0

## 2022-06-13 ASSESSMENT — PATIENT HEALTH QUESTIONNAIRE - PHQ9
2. FEELING DOWN, DEPRESSED OR HOPELESS: 0
SUM OF ALL RESPONSES TO PHQ QUESTIONS 1-9: 0
SUM OF ALL RESPONSES TO PHQ QUESTIONS 1-9: 0
SUM OF ALL RESPONSES TO PHQ9 QUESTIONS 1 & 2: 0
SUM OF ALL RESPONSES TO PHQ QUESTIONS 1-9: 0
1. LITTLE INTEREST OR PLEASURE IN DOING THINGS: 0
SUM OF ALL RESPONSES TO PHQ QUESTIONS 1-9: 0

## 2022-06-13 NOTE — PROGRESS NOTES
[address] [facility]  [id number] [place] [address] [place] [address]  Dept: [phone]  Dept Fax: [phone]  Loc: [phone]    Santos Gandhi is a 37 y.o. male who presents today for his medical conditions/complaints as noted below. Santos Gandhi is c/o of Follow-up (4 mo)        HPI:     HPI   Chief Complaint   Patient presents with    Follow-up     4 mo     Patient's father reports no concerns or complaints at this time. Patient's father reports occasional cough due to seasonal allergies, and takes OTC medications for symptom relief. Denies chest pain, SOA, or abdominal pain. Reports no issues or concerns with medications. Requesting refill on medications.        Past Medical History:   Diagnosis Date    Back stiffness     Down's syndrome     Osteoarthritis     Stiffness in joint     bilateral legs      Past Surgical History:   Procedure Laterality Date    CARDIAC SURGERY      septal repair       Vitals 6/13/2022 2/2/2022 12/15/2021 7/1/2021 1/12/2021 3/8/8444   SYSTOLIC 670 [phone] 283 396   DIASTOLIC 70 68 74 82 70 68   Site Left Lower Arm Left Upper Arm - - - -   Position Sitting Sitting - - - -   Cuff Size - - - - - -   Pulse 100 88 88 82 111 88   Temp 96.8 97.9 97.4 97 97.3 98   Resp - - - 18 20 -   SpO2 81 98 98 98 100 98   Weight 258 lb 262 lb 275 lb 3.2 oz 265 lb 267 lb 256 lb   Height 5' 5\" 5' 5\" 5' 5\" 5' 5\" 5' 3\" 5' 1\"   Body mass index 42.93 kg/m2 43.59 kg/m2 45.79 kg/m2 44.09 kg/m2 47.29 kg/m2 48.37 kg/m2   Some recent data might be hidden       Family History   Problem Relation Age of Onset    Other Father         gout    High Blood Pressure Mother        Social History     Tobacco Use    Smoking status: Never Smoker    Smokeless tobacco: Never Used   Substance Use Topics    Alcohol use: No      Current Outpatient Medications on File Prior to Visit   Medication Sig Dispense Refill    allopurinol (ZYLOPRIM) 300 MG tablet Take 1 tablet by mouth daily 30 tablet 1    benzonatate (TESSALON) 100 MG capsule Take 1 capsule by mouth 3 (Three) Times a Day As Needed for Cough. 30 capsule 2    albuterol sulfate  (90 Base) MCG/ACT inhaler Inhale 2 puffs into the lungs 4 times daily      vitamin C (ASCORBIC ACID) 500 MG tablet Take 500 mg by mouth daily      vitamin D 25 MCG (1000 UT) CAPS Take 2,000 Units by mouth daily      zinc sulfate (ZINCATE) 220 (50 Zn) MG capsule Take 220 mg by mouth daily      ammonium lactate (LAC-HYDRIN) 12 % lotion Apply topically to affected area(s) daily. 400 g 11    indomethacin (INDOCIN) 25 MG capsule Take 1-2 capsules by mouth three times daily with food as needed for inflammation and pain. 60 capsule 1     No current facility-administered medications on file prior to visit. Allergies   Allergen Reactions    Seasonal        Health Maintenance   Topic Date Due    Varicella vaccine (1 of 2 - 2-dose childhood series) Never done    Hepatitis C screen  Never done    COVID-19 Vaccine (2 - Booster for [employer] series) 09/04/2021    A1C test (Diabetic or Prediabetic)  01/12/2022    Flu vaccine (Season Ended) 09/01/2022    Depression Screen  12/15/2022    Lipids  01/12/2026    DTaP/Tdap/Td vaccine (2 - Td or Tdap) 03/08/2027    HIV screen  Completed    Hepatitis A vaccine  Aged Out    Hepatitis B vaccine  Aged Out    Hib vaccine  Aged Out    Meningococcal (ACWY) vaccine  Aged Out    Pneumococcal 0-64 years Vaccine  Aged Out       Subjective:      Review of Systems   Constitutional: Negative for chills and fever. HENT: Negative for ear pain, hearing loss, rhinorrhea and voice change. Eyes: Negative for photophobia and visual disturbance. Respiratory: Negative for cough and shortness of breath. Cardiovascular: Negative for chest pain and palpitations. Gastrointestinal: Negative for nausea and vomiting. Endocrine: Negative. Negative for cold intolerance and heat intolerance.    Genitourinary: Negative for difficulty urinating and flank pain. Musculoskeletal: Negative for back pain and neck pain. Skin: Negative for color change and rash. Allergic/Immunologic: Negative for environmental allergies and food allergies. Neurological: Negative for dizziness, speech difficulty and headaches. Hematological: Does not bruise/bleed easily. Psychiatric/Behavioral: Negative for sleep disturbance and suicidal ideas. All other systems reviewed and are negative. Objective:     Physical Exam  Vitals and nursing note reviewed. Constitutional:       Appearance: He is well-developed. Comments: Down's appearance   HENT:      Head: Atraumatic. Right Ear: External ear normal.      Left Ear: External ear normal.      Nose: Nose normal.   Eyes:      Conjunctiva/sclera: Conjunctivae normal.      Pupils: Pupils are equal, round, and reactive to light. Cardiovascular:      Rate and Rhythm: Normal rate and regular rhythm. Heart sounds: Normal heart sounds, S1 normal and S2 normal.   Pulmonary:      Effort: Pulmonary effort is normal.      Breath sounds: Normal breath sounds. Abdominal:      General: Bowel sounds are normal.      Palpations: Abdomen is soft. Musculoskeletal:         General: Normal range of motion. Cervical back: Normal range of motion and neck supple. Skin:     General: Skin is warm and dry. Neurological:      Mental Status: He is alert and oriented to person, place, and time. Psychiatric:         Behavior: Behavior normal.       /70 (Site: Left Lower Arm, Position: Sitting)   Pulse 100   Temp 96.8 °F (36 °C) (Temporal)   Ht 5' 5\" (1.651 m)   Wt 258 lb (117 kg)   SpO2 (!) 81%   BMI 42.93 kg/m²     Assessment:       Diagnosis Orders   1. Down's syndrome     2.  Screening for diabetes mellitus  POCT glycosylated hemoglobin (Hb A1C)         Plan:   More than 50% of the time was spent counseling and coordinating care for a total time of 30 min face to face.    Patient and patient's father voice no acute concerns or issues at this time. Patient did not speak during examination, but did nod/shake his head to answer questions. HbA1C 5.6. 1. Continue all medications as prescribed. 2. Increase physical activity. 3. Go to [address] prior to next follow-up visit. 4. Follow-up in 6 months or sooner if needed. PDMP Monitoring:    Last PDMP [place] as Reviewed [facility]):  Review User Review Instant Review Result            Urine Drug Screenings (1 yr)    No resulted procedures found. Medication Contract and Consent for Opioid Use Documents Filed      No documents found                 Patient given educational materials -see patient instructions. Discussed use, benefit, and side effects of prescribed medications. All patient questions answered. Pt voiced understanding. Reviewed health maintenance. Instructed to continue currentmedications, diet and exercise. Patient agreed with treatment plan. Follow up as directed. MEDICATIONS:  Orders Placed This Encounter   Medications    budesonide-formoterol (SYMBICORT) 160-4.5 MCG/ACT AERO     Sig: Inhale 2 puffs into the lungs 2 times daily     Dispense:  10.2 g     Refill:  2         ORDERS:  Orders Placed This Encounter   Procedures    POCT glycosylated hemoglobin (Hb A1C)       Follow-up:  Return in about 6 months (around 12/13/2022) for in office. PATIENT INSTRUCTIONS:  There are no Patient Instructions on file for this visit. Electronically signed by VANESA Quintanilla on 6/13/2022 at 11:20 AM    EMR Dragon/transcription disclaimer:  Much of thisencounter note is electronic transcription/translation of spoken language to printed texts. The electronic translation of spoken language may be erroneous, or at times, nonsensical words or phrases may be inadvertentlytranscribed.   Although I have reviewed the note for such errors, some may still exist.

## 2022-07-08 ENCOUNTER — TELEPHONE (OUTPATIENT)
Dept: PRIMARY CARE CLINIC | Age: 42
End: 2022-07-08

## 2022-07-08 RX ORDER — SIMVASTATIN 20 MG
20 TABLET ORAL NIGHTLY
Qty: 30 TABLET | Refills: 5 | Status: SHIPPED | OUTPATIENT
Start: 2022-07-08

## 2022-07-08 NOTE — TELEPHONE ENCOUNTER
----- Message from VANESA Jeffrey sent at 7/7/2022  5:08 PM CDT -----  Please inform patient (his guardian) that lipids are elevated and I would like to start him on medication. Begin 20 mg simvastatin nightly. Will recheck labs in 6 months.

## 2022-07-20 RX ORDER — TAMSULOSIN HYDROCHLORIDE 0.4 MG/1
CAPSULE ORAL
Qty: 90 CAPSULE | Refills: 0 | OUTPATIENT
Start: 2022-07-20

## 2022-08-15 DIAGNOSIS — M10.9 GOUT OF BIG TOE: ICD-10-CM

## 2022-08-15 RX ORDER — ALLOPURINOL 300 MG/1
TABLET ORAL
Qty: 30 TABLET | Refills: 1 | Status: SHIPPED | OUTPATIENT
Start: 2022-08-15

## 2022-08-15 NOTE — TELEPHONE ENCOUNTER
Imani Garsia called to request a refill on his medication.       Last office visit : 6/13/2022   Next office visit : 9/13/2022     Requested Prescriptions     Pending Prescriptions Disp Refills    allopurinol (ZYLOPRIM) 300 MG tablet [Pharmacy Med Name: Allopurinol 300 MG Oral Tablet (ZYLOPRIM)] 30 tablet 1     Sig: Take 1 tablet by mouth daily            Melanie Brower MA

## 2022-08-25 ENCOUNTER — TELEPHONE (OUTPATIENT)
Dept: PRIMARY CARE CLINIC | Age: 42
End: 2022-08-25

## 2022-08-25 NOTE — TELEPHONE ENCOUNTER
Left voicemail for return call to reschedule as provider will be out of the office. Advised to call [phone].

## 2022-08-31 ENCOUNTER — TELEPHONE (OUTPATIENT)
Dept: PRIMARY CARE CLINIC | Age: 42
End: 2022-08-31

## 2022-08-31 NOTE — TELEPHONE ENCOUNTER
Left voicemail for return call to reschedule as provider will be out of the office. Advised to call [phone].

## 2022-10-24 ENCOUNTER — OFFICE VISIT (OUTPATIENT)
Dept: PRIMARY CARE CLINIC | Age: 42
End: 2022-10-24
Payer: MEDICAID

## 2022-10-24 VITALS
HEART RATE: 84 BPM | DIASTOLIC BLOOD PRESSURE: 72 MMHG | OXYGEN SATURATION: 99 % | HEIGHT: 65 IN | WEIGHT: 255 LBS | BODY MASS INDEX: 42.49 KG/M2 | TEMPERATURE: 97.4 F | SYSTOLIC BLOOD PRESSURE: 118 MMHG

## 2022-10-24 DIAGNOSIS — E78.2 MIXED HYPERLIPIDEMIA: ICD-10-CM

## 2022-10-24 DIAGNOSIS — Q90.9 DOWN'S SYNDROME: ICD-10-CM

## 2022-10-24 DIAGNOSIS — Z23 NEED FOR VACCINATION: Primary | ICD-10-CM

## 2022-10-24 DIAGNOSIS — M10.9 GOUT OF BIG TOE: ICD-10-CM

## 2022-10-24 PROCEDURE — 90471 IMMUNIZATION ADMIN: CPT | Performed by: NURSE PRACTITIONER

## 2022-10-24 PROCEDURE — 99214 OFFICE O/P EST MOD 30 MIN: CPT | Performed by: NURSE PRACTITIONER

## 2022-10-24 PROCEDURE — 90674 CCIIV4 VAC NO PRSV 0.5 ML IM: CPT | Performed by: NURSE PRACTITIONER

## 2022-10-24 ASSESSMENT — ENCOUNTER SYMPTOMS
NAUSEA: 0
SHORTNESS OF BREATH: 0
COUGH: 0
PHOTOPHOBIA: 0
RHINORRHEA: 0
BACK PAIN: 0
VOICE CHANGE: 0
VOMITING: 0
COLOR CHANGE: 0

## 2022-10-24 NOTE — PATIENT INSTRUCTIONS
Fasting labs in [address] after Dec 13, 2022 to recheck lipids. Continue all medications as directed. Flu vaccin today. Follow-up in 6 months or sooner if needed.

## 2022-10-24 NOTE — PROGRESS NOTES
[address] [facility]  [address] [address] [place] [address]  Dept: [phone]  Dept Fax: [phone]  Loc: [phone]    Sushil Ayala is a 40 y.o. male who presents today for his medical conditions/complaints as noted below. Sushil Number is c/o of Follow-up (No new issues or concerns)        HPI:     HPI   Chief Complaint   Patient presents with    Follow-up     No new issues or concerns     Patient presents today for follow-up hyperlipidemia and gout. He was started on statin therapy last visit. He will be due to repeat labs in December. Gout has been stable without any new flare ups. He ozzy complaints today. He would like flu vaccine today.      Lab Results   Component Value Date    CHOL 255 (H) 06/13/2022    CHOL 205 (H) 01/12/2021    CHOL 226 (H) 01/06/2020     Lab Results   Component Value Date    TRIG 107 06/13/2022    TRIG 77 01/12/2021    TRIG 90 01/06/2020     Lab Results   Component Value Date    HDL 63 06/13/2022    HDL 58 01/12/2021    HDL 60 01/06/2020     Lab Results   Component Value Date    LDLCALC 171 06/13/2022    LDLCALC 132 01/12/2021    LDLCALC 148 01/06/2020     No results found for: LABVLDL, VLDL  No results found for: [facility]  Lab Results   Component Value Date    LABURIC 10.1 (H) 06/13/2022    URICACID 9.0 (H) 06/12/2012     Lab Results   Component Value Date    WBC 4.1 (L) 06/13/2022    HGB 13.7 (L) 06/13/2022    HCT 41.1 (L) 06/13/2022    MCV 91.5 06/13/2022     06/13/2022     Lab Results   Component Value Date     06/13/2022    K 4.9 06/13/2022     06/13/2022    CO2 21 (L) 06/13/2022    BUN 24 (H) 06/13/2022    CREATININE 1.1 06/13/2022    GLUCOSE 98 06/13/2022    CALCIUM 9.4 06/13/2022    PROT 7.8 06/13/2022    LABALBU 3.8 06/13/2022    BILITOT 0.3 06/13/2022    ALKPHOS 120 06/13/2022    AST 22 06/13/2022    ALT 18 06/13/2022    LABGLOM >60 06/13/2022    GFRAA >59 06/13/2022    GLOB 3.8 02/23/2017 Lab Results   Component Value Date    TSH 2.110 06/13/2022           Past Medical History:   Diagnosis Date    Back stiffness     Down's syndrome     Osteoarthritis     Stiffness in joint     bilateral legs      Past Surgical History:   Procedure Laterality Date    CARDIAC SURGERY      septal repair       Vitals 10/24/2022 6/13/2022 2/2/2022 12/15/2021 7/1/2021 1/02/4440   SYSTOLIC 575 130 511 508 448 522   DIASTOLIC 72 70 68 74 82 70   Site - Left Lower Arm Left Upper Arm - - -   Position - Sitting Sitting - - -   Cuff Size - - - - - -   Pulse 84 100 88 88 82 111   Temp 97.4 96.8 97.9 97.4 97 97.3   Resp - - - - 18 20   SpO2 99 81 98 98 98 100   Weight 255 lb 258 lb 262 lb 275 lb 3.2 oz 265 lb 267 lb   Height 5' 5\" 5' 5\" 5' 5\" 5' 5\" 5' 5\" 5' 3\"   Body mass index 42.43 kg/m2 42.93 kg/m2 43.59 kg/m2 45.79 kg/m2 44.09 kg/m2 47.29 kg/m2   Some recent data might be hidden       Family History   Problem Relation Age of Onset    Other Father         gout    High Blood Pressure Mother        Social History     Tobacco Use    Smoking status: Never    Smokeless tobacco: Never   Substance Use Topics    Alcohol use: No      Current Outpatient Medications on File Prior to Visit   Medication Sig Dispense Refill    allopurinol (ZYLOPRIM) 300 MG tablet Take 1 tablet by mouth daily 30 tablet 1    simvastatin (ZOCOR) 20 MG tablet Take 1 tablet by mouth nightly 30 tablet 5    budesonide-formoterol (SYMBICORT) 160-4.5 MCG/ACT AERO Inhale 2 puffs into the lungs 2 times daily 10.2 g 2    albuterol sulfate  (90 Base) MCG/ACT inhaler Inhale 2 puffs into the lungs 4 times daily      vitamin C (ASCORBIC ACID) 500 MG tablet Take 500 mg by mouth daily      vitamin D 25 MCG (1000 UT) CAPS Take 2,000 Units by mouth daily      zinc sulfate (ZINCATE) 220 (50 Zn) MG capsule Take 220 mg by mouth daily      ammonium lactate (LAC-HYDRIN) 12 % lotion Apply topically to affected area(s) daily.  400 g 11    indomethacin (INDOCIN) 25 MG capsule Take 1-2 capsules by mouth three times daily with food as needed for inflammation and pain. 60 capsule 1     No current facility-administered medications on file prior to visit. Allergies   Allergen Reactions    Seasonal        Health Maintenance   Topic Date Due    Varicella vaccine (1 of 2 - 2-dose childhood series) Never done    COVID-19 Vaccine (2 - Booster for [employer] series) 09/04/2021    Flu vaccine (1) 08/01/2022    A1C test (Diabetic or Prediabetic)  06/13/2023    Lipids  06/13/2023    Depression Screen  06/13/2023    DTaP/Tdap/Td vaccine (2 - Td or Tdap) 03/08/2027    Hepatitis C screen  Completed    HIV screen  Completed    Hepatitis A vaccine  Aged Out    Hib vaccine  Aged Out    Meningococcal (ACWY) vaccine  Aged Out    Pneumococcal 0-64 years Vaccine  Aged Out       Subjective:      Review of Systems   Constitutional:  Negative for chills and fever. HENT:  Negative for ear pain, hearing loss, rhinorrhea and voice change. Eyes:  Negative for photophobia and visual disturbance. Respiratory:  Negative for cough and shortness of breath. Cardiovascular:  Negative for chest pain and palpitations. Gastrointestinal:  Negative for nausea and vomiting. Endocrine: Negative. Negative for cold intolerance and heat intolerance. Genitourinary:  Negative for difficulty urinating and flank pain. Musculoskeletal:  Negative for back pain and neck pain. Skin:  Negative for color change and rash. Allergic/Immunologic: Negative for environmental allergies and food allergies. Neurological:  Negative for dizziness, speech difficulty and headaches. Hematological:  Does not bruise/bleed easily. Psychiatric/Behavioral:  Negative for sleep disturbance and suicidal ideas. Objective:     Physical Exam  Vitals and nursing note reviewed. Constitutional:       Appearance: He is well-developed. Comments: Down's appearance   HENT:      Head: Atraumatic.       Right Ear: External ear normal.      Left Ear: External ear normal.      Nose: Nose normal.   Eyes:      Conjunctiva/sclera: Conjunctivae normal.      Pupils: Pupils are equal, round, and reactive to light. Cardiovascular:      Rate and Rhythm: Normal rate and regular rhythm. Heart sounds: Normal heart sounds, S1 normal and S2 normal.   Pulmonary:      Effort: Pulmonary effort is normal.      Breath sounds: Normal breath sounds. Abdominal:      General: Bowel sounds are normal.      Palpations: Abdomen is soft. Musculoskeletal:         General: Normal range of motion. Cervical back: Normal range of motion and neck supple. Skin:     General: Skin is warm and dry. Neurological:      Mental Status: He is alert and oriented to person, place, and time. Psychiatric:         Behavior: Behavior normal.     /72   Pulse 84   Temp 97.4 °F (36.3 °C) (Temporal)   Ht 5' 5\" (1.651 m)   Wt 255 lb (115.7 kg)   SpO2 99%   BMI 42.43 kg/m²     Assessment:       Diagnosis Orders   1. Need for vaccination  Influenza, FLUCELVAX, (age 10 mo+), IM, Preservative Free, 0.5 mL      2. Down's syndrome  CBC with Auto Differential    Comprehensive Metabolic Panel    Lipid Panel    Hemoglobin A1C    TSH    Uric Acid      3. Mixed hyperlipidemia  CBC with Auto Differential    Comprehensive Metabolic Panel    Lipid Panel    Hemoglobin A1C    TSH    Uric Acid      4. Gout of big toe  CBC with Auto Differential    Comprehensive Metabolic Panel    Lipid Panel    Hemoglobin A1C    TSH    Uric Acid            Plan:   More than 50% of the time was spent counseling and coordinating care for a total time of 30 min face to face. Fasting labs in [address] after Dec 13, 2022 to recheck lipids. Continue all medications as directed. Flu vaccin today. Follow-up in 6 months or sooner if needed.      PDMP Monitoring:    Last PDMP [place] as Reviewed:  Review User Review Instant Review Result            Urine Drug Screenings (1 yr)    No resulted procedures found. Medication Contract and Consent for Opioid Use Documents Filed        No documents found                     Patient given educational materials -see patient instructions. Discussed use, benefit, and side effects of prescribed medications. All patient questions answered. Pt voiced understanding. Reviewed health maintenance. Instructed to continue currentmedications, diet and exercise. Patient agreed with treatment plan. Follow up as directed. MEDICATIONS:  No orders of the defined types were placed in this encounter. ORDERS:  Orders Placed This Encounter   Procedures    Influenza, FLUCELVAX, (age 10 mo+), IM, Preservative Free, 0.5 mL    CBC with Auto Differential    Comprehensive Metabolic Panel    Lipid Panel    Hemoglobin A1C    TSH    Uric Acid       Follow-up:  No follow-ups on file. PATIENT INSTRUCTIONS:  Patient Instructions   Fasting labs in [address] after Dec 13, 2022 to recheck lipids. Continue all medications as directed. Flu vaccin today. Follow-up in 6 months or sooner if needed. Electronically signed by VANESA Zhao on 10/24/2022 at 9:36 AM    EMR Dragon/transcription disclaimer:  Much of thisencounter note is electronic transcription/translation of spoken language to printed texts. The electronic translation of spoken language may be erroneous, or at times, nonsensical words or phrases may be inadvertentlytranscribed.   Although I have reviewed the note for such errors, some may still exist.

## 2022-12-06 ENCOUNTER — TELEPHONE (OUTPATIENT)
Dept: URGENT CARE | Facility: CLINIC | Age: 42
End: 2022-12-06

## 2022-12-06 DIAGNOSIS — B34.9 VIRAL ILLNESS: Primary | ICD-10-CM

## 2022-12-06 PROCEDURE — 87636 SARSCOV2 & INF A&B AMP PRB: CPT | Performed by: NURSE PRACTITIONER

## 2022-12-06 RX ORDER — BROMPHENIRAMINE MALEATE, PSEUDOEPHEDRINE HYDROCHLORIDE, AND DEXTROMETHORPHAN HYDROBROMIDE 2; 30; 10 MG/5ML; MG/5ML; MG/5ML
5 SYRUP ORAL 4 TIMES DAILY PRN
Qty: 118 ML | Refills: 0 | Status: SHIPPED | OUTPATIENT
Start: 2022-12-06

## 2022-12-14 ENCOUNTER — OFFICE VISIT (OUTPATIENT)
Dept: PRIMARY CARE CLINIC | Age: 42
End: 2022-12-14
Payer: MEDICAID

## 2022-12-14 VITALS
OXYGEN SATURATION: 99 % | BODY MASS INDEX: 42.32 KG/M2 | HEART RATE: 76 BPM | TEMPERATURE: 97.6 F | SYSTOLIC BLOOD PRESSURE: 116 MMHG | HEIGHT: 65 IN | WEIGHT: 254 LBS | DIASTOLIC BLOOD PRESSURE: 74 MMHG

## 2022-12-14 DIAGNOSIS — Q90.9 DOWN'S SYNDROME: ICD-10-CM

## 2022-12-14 DIAGNOSIS — M10.9 GOUT OF BIG TOE: ICD-10-CM

## 2022-12-14 DIAGNOSIS — E78.2 MIXED HYPERLIPIDEMIA: Primary | ICD-10-CM

## 2022-12-14 DIAGNOSIS — R05.1 ACUTE COUGH: ICD-10-CM

## 2022-12-14 DIAGNOSIS — Z23 NEED FOR VACCINATION: ICD-10-CM

## 2022-12-14 DIAGNOSIS — E78.2 MIXED HYPERLIPIDEMIA: ICD-10-CM

## 2022-12-14 LAB
ALBUMIN SERPL-MCNC: 3.9 G/DL (ref 3.5–5.2)
ALP BLD-CCNC: 109 U/L (ref 40–130)
ALT SERPL-CCNC: 22 U/L (ref 5–41)
ANION GAP SERPL CALCULATED.3IONS-SCNC: 11 MMOL/L (ref 7–19)
AST SERPL-CCNC: 20 U/L (ref 5–40)
BASOPHILS ABSOLUTE: 0.1 K/UL (ref 0–0.2)
BASOPHILS RELATIVE PERCENT: 1.1 % (ref 0–1)
BILIRUB SERPL-MCNC: <0.2 MG/DL (ref 0.2–1.2)
BUN BLDV-MCNC: 30 MG/DL (ref 6–20)
CALCIUM SERPL-MCNC: 9.1 MG/DL (ref 8.6–10)
CHLORIDE BLD-SCNC: 104 MMOL/L (ref 98–111)
CHOLESTEROL, TOTAL: 210 MG/DL (ref 160–199)
CO2: 24 MMOL/L (ref 22–29)
CREAT SERPL-MCNC: 1.2 MG/DL (ref 0.5–1.2)
EOSINOPHILS ABSOLUTE: 0 K/UL (ref 0–0.6)
EOSINOPHILS RELATIVE PERCENT: 0.3 % (ref 0–5)
GFR SERPL CREATININE-BSD FRML MDRD: >60 ML/MIN/{1.73_M2}
GLUCOSE BLD-MCNC: 98 MG/DL (ref 74–109)
HBA1C MFR BLD: 5.9 % (ref 4–6)
HCT VFR BLD CALC: 40.7 % (ref 42–52)
HDLC SERPL-MCNC: 70 MG/DL (ref 55–121)
HEMOGLOBIN: 13.3 G/DL (ref 14–18)
IMMATURE GRANULOCYTES #: 0 K/UL
LDL CHOLESTEROL CALCULATED: 128 MG/DL
LYMPHOCYTES ABSOLUTE: 2.1 K/UL (ref 1.1–4.5)
LYMPHOCYTES RELATIVE PERCENT: 32.2 % (ref 20–40)
MCH RBC QN AUTO: 30.5 PG (ref 27–31)
MCHC RBC AUTO-ENTMCNC: 32.7 G/DL (ref 33–37)
MCV RBC AUTO: 93.3 FL (ref 80–94)
MONOCYTES ABSOLUTE: 0.8 K/UL (ref 0–0.9)
MONOCYTES RELATIVE PERCENT: 12.9 % (ref 0–10)
NEUTROPHILS ABSOLUTE: 3.4 K/UL (ref 1.5–7.5)
NEUTROPHILS RELATIVE PERCENT: 53.2 % (ref 50–65)
PDW BLD-RTO: 15.4 % (ref 11.5–14.5)
PLATELET # BLD: 318 K/UL (ref 130–400)
PMV BLD AUTO: 9.6 FL (ref 9.4–12.4)
POTASSIUM SERPL-SCNC: 4.3 MMOL/L (ref 3.5–5)
RBC # BLD: 4.36 M/UL (ref 4.7–6.1)
SODIUM BLD-SCNC: 139 MMOL/L (ref 136–145)
TOTAL PROTEIN: 7.2 G/DL (ref 6.6–8.7)
TRIGL SERPL-MCNC: 60 MG/DL (ref 0–149)
TSH SERPL DL<=0.05 MIU/L-ACNC: 2.49 UIU/ML (ref 0.27–4.2)
URIC ACID, SERUM: 4.6 MG/DL (ref 3.4–7)
WBC # BLD: 6.4 K/UL (ref 4.8–10.8)

## 2022-12-14 PROCEDURE — 99214 OFFICE O/P EST MOD 30 MIN: CPT | Performed by: NURSE PRACTITIONER

## 2022-12-14 RX ORDER — BENZONATATE 200 MG/1
200 CAPSULE ORAL 3 TIMES DAILY PRN
Qty: 30 CAPSULE | Refills: 0 | Status: SHIPPED | OUTPATIENT
Start: 2022-12-14 | End: 2022-12-24

## 2022-12-14 RX ORDER — ALBUTEROL SULFATE 90 UG/1
2 AEROSOL, METERED RESPIRATORY (INHALATION) 4 TIMES DAILY PRN
Qty: 18 G | Refills: 0 | Status: SHIPPED | OUTPATIENT
Start: 2022-12-14

## 2022-12-14 NOTE — PROGRESS NOTES
[address] [facility] CARE  [address] [address] [place] [address]  Dept: [phone]  Dept Fax: [phone]  Loc: [phone]    Randall Roldan is a 40 y.o. male who presents today for his medical conditions/complaints as noted below. Randall Roldan is c/o of Follow-up (Has had a cough for about 3 weeks)        HPI:     HPI   Chief Complaint   Patient presents with    Follow-up     Has had a cough for about 3 weeks     Patient presents today for follow-up hyperlipidemia, gout. He is due for fasting labs; he will do this today. He did have have cough and this has been going on for 3 weeks. His father reports it started with possible flu like symptoms. He has been taking theraflu. He has had tessalon but needs refill. He uses symbicort daily. He occasionally has SOB but does not have a rescue inhaler.         Past Medical History:   Diagnosis Date    Back stiffness     Down's syndrome     Osteoarthritis     Stiffness in joint     bilateral legs      Past Surgical History:   Procedure Laterality Date    CARDIAC SURGERY      septal repair       Vitals 12/14/2022 10/24/2022 6/13/2022 2/2/2022 12/15/2021 6/9/3790   SYSTOLIC 292 636 637 508 161 443   DIASTOLIC 74 72 70 68 74 82   Site - - Left Lower Arm Left Upper Arm - -   Position - - Sitting Sitting - -   Cuff Size - - - - - -   Pulse 76 84 100 88 88 82   Temp 97.6 97.4 96.8 97.9 97.4 97   Resp - - - - - 18   SpO2 99 99 81 98 98 98   Weight 254 lb 255 lb 258 lb 262 lb 275 lb 3.2 oz 265 lb   Height 5' 5\" 5' 5\" 5' 5\" 5' 5\" 5' 5\" 5' 5\"   Body mass index 42.26 kg/m2 42.43 kg/m2 42.93 kg/m2 43.59 kg/m2 45.79 kg/m2 44.09 kg/m2   Some recent data might be hidden       Family History   Problem Relation Age of Onset    Other Father         gout    High Blood Pressure Mother        Social History     Tobacco Use    Smoking status: Never    Smokeless tobacco: Never   Substance Use Topics    Alcohol use: No      Current Outpatient Medications on File Prior to Visit   Medication Sig Dispense Refill    allopurinol (ZYLOPRIM) 300 MG tablet Take 1 tablet by mouth daily 30 tablet 1    simvastatin (ZOCOR) 20 MG tablet Take 1 tablet by mouth nightly 30 tablet 5    budesonide-formoterol (SYMBICORT) 160-4.5 MCG/ACT AERO Inhale 2 puffs into the lungs 2 times daily 10.2 g 2    albuterol sulfate  (90 Base) MCG/ACT inhaler Inhale 2 puffs into the lungs 4 times daily      vitamin C (ASCORBIC ACID) 500 MG tablet Take 500 mg by mouth daily      vitamin D 25 MCG (1000 UT) CAPS Take 2,000 Units by mouth daily      zinc sulfate (ZINCATE) 220 (50 Zn) MG capsule Take 220 mg by mouth daily      ammonium lactate (LAC-HYDRIN) 12 % lotion Apply topically to affected area(s) daily. 400 g 11    indomethacin (INDOCIN) 25 MG capsule Take 1-2 capsules by mouth three times daily with food as needed for inflammation and pain. 60 capsule 1     No current facility-administered medications on file prior to visit. Allergies   Allergen Reactions    Seasonal        Health Maintenance   Topic Date Due    Varicella vaccine (1 of 2 - 2-dose childhood series) Never done    COVID-19 Vaccine (2 - Booster for Radha series) 09/04/2021    A1C test (Diabetic or Prediabetic)  06/13/2023    Lipids  06/13/2023    Depression Screen  06/13/2023    DTaP/Tdap/Td vaccine (2 - Td or Tdap) 03/08/2027    Flu vaccine  Completed    Hepatitis C screen  Completed    HIV screen  Completed    Hepatitis A vaccine  Aged Out    Hib vaccine  Aged Out    Meningococcal (ACWY) vaccine  Aged Out    Pneumococcal 0-64 years Vaccine  Aged Out       Subjective:      Review of Systems   Constitutional:  Negative for chills and fever. HENT:  Negative for ear pain, hearing loss, rhinorrhea and voice change. Eyes:  Negative for photophobia and visual disturbance. Respiratory:  Negative for cough and shortness of breath. Cardiovascular:  Negative for chest pain and palpitations.    Gastrointestinal: Negative for nausea and vomiting. Endocrine: Negative. Negative for cold intolerance and heat intolerance. Genitourinary:  Negative for difficulty urinating and flank pain. Musculoskeletal:  Negative for back pain and neck pain. Skin:  Negative for color change and rash. Allergic/Immunologic: Negative for environmental allergies and food allergies. Neurological:  Negative for dizziness, speech difficulty and headaches. Hematological:  Does not bruise/bleed easily. Psychiatric/Behavioral:  Negative for sleep disturbance and suicidal ideas. Objective:     Physical Exam  Vitals and nursing note reviewed. Constitutional:       Appearance: He is well-developed. HENT:      Head: Atraumatic. Right Ear: External ear normal.      Left Ear: External ear normal.      Nose: Nose normal.   Eyes:      Conjunctiva/sclera: Conjunctivae normal.      Pupils: Pupils are equal, round, and reactive to light. Cardiovascular:      Rate and Rhythm: Normal rate and regular rhythm. Heart sounds: Normal heart sounds, S1 normal and S2 normal.   Pulmonary:      Effort: Pulmonary effort is normal.      Breath sounds: Normal breath sounds. Abdominal:      General: Bowel sounds are normal.      Palpations: Abdomen is soft. Musculoskeletal:         General: Normal range of motion. Cervical back: Normal range of motion and neck supple. Skin:     General: Skin is warm and dry. Neurological:      Mental Status: He is alert and oriented to person, place, and time. Psychiatric:         Behavior: Behavior normal.     /74   Pulse 76   Temp 97.6 °F (36.4 °C) (Temporal)   Ht 5' 5\" (1.651 m)   Wt 254 lb (115.2 kg)   SpO2 99%   BMI 42.27 kg/m²     Assessment:       Diagnosis Orders   1. Mixed hyperlipidemia        2. Down's syndrome        3. Need for vaccination        4. Gout of big toe        5.  Acute cough              Plan:   More than 50% of the time was spent counseling and coordinating care for a total time of 30 min face to face. Albuterol inhaler as needed every 4-6 hours for shortness of breath/wheezing. Tessalon every 8 hours for cough; may also use delsym over-the-counter. Continue symbicort daily. Covid booster today. Follow-up in 6 months or sooner if needed. PDMP Monitoring:    Last PDMP [place] as Reviewed:  Review User Review Instant Review Result            Urine Drug Screenings (1 yr)    No resulted procedures found. Medication Contract and Consent for Opioid Use Documents Filed        No documents found                     Patient given educational materials -see patient instructions. Discussed use, benefit, and side effects of prescribed medications. All patient questions answered. Pt voiced understanding. Reviewed health maintenance. Instructed to continue currentmedications, diet and exercise. Patient agreed with treatment plan. Follow up as directed. MEDICATIONS:  Orders Placed This Encounter   Medications    benzonatate (TESSALON) 200 MG capsule     Sig: Take 1 capsule by mouth 3 times daily as needed for Cough     Dispense:  30 capsule     Refill:  0    albuterol sulfate HFA (VENTOLIN HFA) 108 (90 Base) MCG/ACT inhaler     Sig: Inhale 2 puffs into the lungs 4 times daily as needed for Wheezing     Dispense:  18 g     Refill:  0         ORDERS:  No orders of the defined types were placed in this encounter. Follow-up:  No follow-ups on file. PATIENT INSTRUCTIONS:  Patient Instructions   Albuterol inhaler as needed every 4-6 hours for shortness of breath/wheezing. Tessalon every 8 hours for cough; may also use delsym over-the-counter. Continue symbicort daily. Covid booster today. Follow-up in 6 months or sooner if needed. Electronically signed by VANESA Tafoya on 12/14/2022 at 9:54 AM    EMR Dragon/transcription disclaimer:  Much of thisencounter note is electronic transcription/translation of spoken language to printed texts.   The electronic translation of spoken language may be erroneous, or at times, nonsensical words or phrases may be inadvertentlytranscribed.   Although I have reviewed the note for such errors, some may still exist.

## 2022-12-14 NOTE — PATIENT INSTRUCTIONS
Albuterol inhaler as needed every 4-6 hours for shortness of breath/wheezing. Tessalon every 8 hours for cough; may also use delsym over-the-counter. Continue symbicort daily. Covid booster today. Follow-up in 6 months or sooner if needed.

## 2022-12-15 ENCOUNTER — TELEPHONE (OUTPATIENT)
Dept: PRIMARY CARE CLINIC | Age: 42
End: 2022-12-15

## 2022-12-23 ASSESSMENT — ENCOUNTER SYMPTOMS
RHINORRHEA: 0
PHOTOPHOBIA: 0
COLOR CHANGE: 0
BACK PAIN: 0
VOICE CHANGE: 0
VOMITING: 0
COUGH: 0
NAUSEA: 0
SHORTNESS OF BREATH: 0

## 2023-03-10 RX ORDER — SIMVASTATIN 20 MG
TABLET ORAL
Qty: 30 TABLET | Refills: 5 | Status: SHIPPED | OUTPATIENT
Start: 2023-03-10

## 2023-11-21 ENCOUNTER — TELEPHONE (OUTPATIENT)
Dept: PRIMARY CARE CLINIC | Age: 43
End: 2023-11-21

## 2023-11-21 NOTE — TELEPHONE ENCOUNTER
Amaris with [facility] called checking on status of plan of treatment. She is re faxing today 11-21-23. Please advise.
1932
100% of the time

## 2023-12-14 ENCOUNTER — OFFICE VISIT (OUTPATIENT)
Dept: PRIMARY CARE CLINIC | Age: 43
End: 2023-12-14
Payer: MEDICAID

## 2023-12-14 VITALS
OXYGEN SATURATION: 96 % | HEART RATE: 79 BPM | SYSTOLIC BLOOD PRESSURE: 128 MMHG | HEIGHT: 62 IN | WEIGHT: 252 LBS | BODY MASS INDEX: 46.38 KG/M2 | DIASTOLIC BLOOD PRESSURE: 80 MMHG

## 2023-12-14 DIAGNOSIS — M10.9 GOUT OF BIG TOE: ICD-10-CM

## 2023-12-14 DIAGNOSIS — Z00.00 ROUTINE GENERAL MEDICAL EXAMINATION AT A HEALTH CARE FACILITY: ICD-10-CM

## 2023-12-14 DIAGNOSIS — L30.9 ECZEMA, UNSPECIFIED TYPE: ICD-10-CM

## 2023-12-14 LAB
ALBUMIN SERPL-MCNC: 3.9 G/DL (ref 3.5–5.2)
ALP SERPL-CCNC: 122 U/L (ref 40–130)
ALT SERPL-CCNC: 25 U/L (ref 5–41)
ANION GAP SERPL CALCULATED.3IONS-SCNC: 12 MMOL/L (ref 7–19)
AST SERPL-CCNC: 20 U/L (ref 5–40)
BASOPHILS # BLD: 0.1 K/UL (ref 0–0.2)
BASOPHILS NFR BLD: 1.5 % (ref 0–1)
BILIRUB SERPL-MCNC: <0.2 MG/DL (ref 0.2–1.2)
BUN SERPL-MCNC: 26 MG/DL (ref 6–20)
CALCIUM SERPL-MCNC: 9.5 MG/DL (ref 8.6–10)
CHLORIDE SERPL-SCNC: 107 MMOL/L (ref 98–111)
CHOLEST SERPL-MCNC: 223 MG/DL (ref 160–199)
CO2 SERPL-SCNC: 24 MMOL/L (ref 22–29)
CREAT SERPL-MCNC: 1.1 MG/DL (ref 0.5–1.2)
EOSINOPHIL # BLD: 0 K/UL (ref 0–0.6)
EOSINOPHIL NFR BLD: 0.6 % (ref 0–5)
ERYTHROCYTE [DISTWIDTH] IN BLOOD BY AUTOMATED COUNT: 14.4 % (ref 11.5–14.5)
GLUCOSE SERPL-MCNC: 97 MG/DL (ref 74–109)
HBA1C MFR BLD: 5.8 % (ref 4–6)
HCT VFR BLD AUTO: 42.6 % (ref 42–52)
HDLC SERPL-MCNC: 60 MG/DL (ref 55–121)
HGB BLD-MCNC: 13.9 G/DL (ref 14–18)
IMM GRANULOCYTES # BLD: 0 K/UL
LDLC SERPL CALC-MCNC: 144 MG/DL
LYMPHOCYTES # BLD: 1.3 K/UL (ref 1.1–4.5)
LYMPHOCYTES NFR BLD: 41 % (ref 20–40)
MCH RBC QN AUTO: 30.2 PG (ref 27–31)
MCHC RBC AUTO-ENTMCNC: 32.6 G/DL (ref 33–37)
MCV RBC AUTO: 92.6 FL (ref 80–94)
MONOCYTES # BLD: 0.4 K/UL (ref 0–0.9)
MONOCYTES NFR BLD: 12.8 % (ref 0–10)
NEUTROPHILS # BLD: 1.4 K/UL (ref 1.5–7.5)
NEUTS SEG NFR BLD: 43.8 % (ref 50–65)
PLATELET # BLD AUTO: 330 K/UL (ref 130–400)
PMV BLD AUTO: 9.7 FL (ref 9.4–12.4)
POTASSIUM SERPL-SCNC: 4.3 MMOL/L (ref 3.5–5)
PROT SERPL-MCNC: 7.5 G/DL (ref 6.6–8.7)
RBC # BLD AUTO: 4.6 M/UL (ref 4.7–6.1)
SODIUM SERPL-SCNC: 143 MMOL/L (ref 136–145)
TRIGL SERPL-MCNC: 93 MG/DL (ref 0–149)
TSH SERPL DL<=0.005 MIU/L-ACNC: 2.02 UIU/ML (ref 0.27–4.2)
URATE SERPL-MCNC: 9.9 MG/DL (ref 3.4–7)
WBC # BLD AUTO: 3.3 K/UL (ref 4.8–10.8)

## 2023-12-14 PROCEDURE — 99213 OFFICE O/P EST LOW 20 MIN: CPT | Performed by: NURSE PRACTITIONER

## 2023-12-14 RX ORDER — INDOMETHACIN 25 MG/1
CAPSULE ORAL
Qty: 60 CAPSULE | Refills: 1 | Status: SHIPPED | OUTPATIENT
Start: 2023-12-14

## 2023-12-14 RX ORDER — ALLOPURINOL 300 MG/1
300 TABLET ORAL DAILY
Qty: 30 TABLET | Refills: 1 | Status: SHIPPED | OUTPATIENT
Start: 2023-12-14

## 2023-12-14 RX ORDER — AMMONIUM LACTATE 12 G/100G
LOTION TOPICAL
Qty: 400 G | Refills: 11 | Status: SHIPPED | OUTPATIENT
Start: 2023-12-14

## 2023-12-14 RX ORDER — BUDESONIDE AND FORMOTEROL FUMARATE DIHYDRATE 160; 4.5 UG/1; UG/1
2 AEROSOL RESPIRATORY (INHALATION) 2 TIMES DAILY
Qty: 10.2 G | Refills: 2 | Status: SHIPPED | OUTPATIENT
Start: 2023-12-14

## 2023-12-14 RX ORDER — ALBUTEROL SULFATE 90 UG/1
2 AEROSOL, METERED RESPIRATORY (INHALATION) 4 TIMES DAILY PRN
Qty: 18 G | Refills: 1 | Status: SHIPPED | OUTPATIENT
Start: 2023-12-14

## 2023-12-14 RX ORDER — SIMVASTATIN 20 MG
20 TABLET ORAL NIGHTLY
Qty: 30 TABLET | Refills: 5 | Status: SHIPPED | OUTPATIENT
Start: 2023-12-14

## 2024-02-08 DIAGNOSIS — M10.9 GOUT OF BIG TOE: ICD-10-CM

## 2024-02-12 RX ORDER — ALLOPURINOL 300 MG/1
300 TABLET ORAL DAILY
Qty: 30 TABLET | Refills: 1 | Status: SHIPPED | OUTPATIENT
Start: 2024-02-12

## 2024-06-18 ENCOUNTER — OFFICE VISIT (OUTPATIENT)
Dept: PRIMARY CARE CLINIC | Age: 44
End: 2024-06-18
Payer: MEDICAID

## 2024-06-18 VITALS
OXYGEN SATURATION: 96 % | HEART RATE: 78 BPM | WEIGHT: 252.6 LBS | BODY MASS INDEX: 46.2 KG/M2 | DIASTOLIC BLOOD PRESSURE: 84 MMHG | TEMPERATURE: 97 F | SYSTOLIC BLOOD PRESSURE: 130 MMHG

## 2024-06-18 DIAGNOSIS — E55.9 VITAMIN D DEFICIENCY: ICD-10-CM

## 2024-06-18 DIAGNOSIS — M10.9 GOUT OF BIG TOE: ICD-10-CM

## 2024-06-18 DIAGNOSIS — Z00.00 ENCOUNTER FOR ANNUAL PHYSICAL EXAM: Primary | ICD-10-CM

## 2024-06-18 DIAGNOSIS — Q90.9 DOWN'S SYNDROME: ICD-10-CM

## 2024-06-18 DIAGNOSIS — E78.2 MIXED HYPERLIPIDEMIA: ICD-10-CM

## 2024-06-18 DIAGNOSIS — R79.9 ABNORMAL BLOOD CHEMISTRY: ICD-10-CM

## 2024-06-18 DIAGNOSIS — L30.9 ECZEMA, UNSPECIFIED TYPE: ICD-10-CM

## 2024-06-18 LAB
25(OH)D3 SERPL-MCNC: 17.3 NG/ML
ALBUMIN SERPL-MCNC: 3.8 G/DL (ref 3.5–5.2)
ALP SERPL-CCNC: 115 U/L (ref 40–130)
ALT SERPL-CCNC: 17 U/L (ref 5–41)
ANION GAP SERPL CALCULATED.3IONS-SCNC: 19 MMOL/L (ref 7–19)
AST SERPL-CCNC: 20 U/L (ref 5–40)
BASOPHILS # BLD: 0.1 K/UL (ref 0–0.2)
BASOPHILS NFR BLD: 1.7 % (ref 0–1)
BILIRUB SERPL-MCNC: <0.2 MG/DL (ref 0.2–1.2)
BUN SERPL-MCNC: 30 MG/DL (ref 6–20)
CALCIUM SERPL-MCNC: 9.3 MG/DL (ref 8.6–10)
CHLORIDE SERPL-SCNC: 101 MMOL/L (ref 98–111)
CHOLEST SERPL-MCNC: 206 MG/DL (ref 160–199)
CO2 SERPL-SCNC: 20 MMOL/L (ref 22–29)
CREAT SERPL-MCNC: 1.2 MG/DL (ref 0.5–1.2)
EOSINOPHIL # BLD: 0.1 K/UL (ref 0–0.6)
EOSINOPHIL NFR BLD: 2.6 % (ref 0–5)
ERYTHROCYTE [DISTWIDTH] IN BLOOD BY AUTOMATED COUNT: 14.7 % (ref 11.5–14.5)
GLUCOSE SERPL-MCNC: 96 MG/DL (ref 74–109)
HBA1C MFR BLD: 5.6 % (ref 4–6)
HCT VFR BLD AUTO: 40.7 % (ref 42–52)
HCV AB SERPL QL IA: NORMAL
HDLC SERPL-MCNC: 62 MG/DL (ref 55–121)
HGB BLD-MCNC: 13.2 G/DL (ref 14–18)
IMM GRANULOCYTES # BLD: 0 K/UL
LDLC SERPL CALC-MCNC: 129 MG/DL
LYMPHOCYTES # BLD: 0.9 K/UL (ref 1.1–4.5)
LYMPHOCYTES NFR BLD: 22.2 % (ref 20–40)
MCH RBC QN AUTO: 30.1 PG (ref 27–31)
MCHC RBC AUTO-ENTMCNC: 32.4 G/DL (ref 33–37)
MCV RBC AUTO: 92.9 FL (ref 80–94)
MONOCYTES # BLD: 0.8 K/UL (ref 0–0.9)
MONOCYTES NFR BLD: 18.4 % (ref 0–10)
NEUTROPHILS # BLD: 2.3 K/UL (ref 1.5–7.5)
NEUTS SEG NFR BLD: 54.6 % (ref 50–65)
PLATELET # BLD AUTO: 332 K/UL (ref 130–400)
PMV BLD AUTO: 9.8 FL (ref 9.4–12.4)
POTASSIUM SERPL-SCNC: 4.5 MMOL/L (ref 3.5–5)
PROT SERPL-MCNC: 7.7 G/DL (ref 6.6–8.7)
RBC # BLD AUTO: 4.38 M/UL (ref 4.7–6.1)
SODIUM SERPL-SCNC: 140 MMOL/L (ref 136–145)
TRIGL SERPL-MCNC: 76 MG/DL (ref 0–149)
TSH SERPL DL<=0.005 MIU/L-ACNC: 2.04 UIU/ML (ref 0.27–4.2)
URATE SERPL-MCNC: 9.3 MG/DL (ref 3.4–7)
WBC # BLD AUTO: 4.2 K/UL (ref 4.8–10.8)

## 2024-06-18 PROCEDURE — 99396 PREV VISIT EST AGE 40-64: CPT | Performed by: NURSE PRACTITIONER

## 2024-06-18 RX ORDER — AMMONIUM LACTATE 12 G/100G
LOTION TOPICAL
Qty: 400 G | Refills: 11 | Status: SHIPPED | OUTPATIENT
Start: 2024-06-18

## 2024-06-18 SDOH — ECONOMIC STABILITY: HOUSING INSECURITY
IN THE LAST 12 MONTHS, WAS THERE A TIME WHEN YOU DID NOT HAVE A STEADY PLACE TO SLEEP OR SLEPT IN A SHELTER (INCLUDING NOW)?: NO

## 2024-06-18 SDOH — ECONOMIC STABILITY: INCOME INSECURITY: HOW HARD IS IT FOR YOU TO PAY FOR THE VERY BASICS LIKE FOOD, HOUSING, MEDICAL CARE, AND HEATING?: NOT HARD AT ALL

## 2024-06-18 SDOH — ECONOMIC STABILITY: FOOD INSECURITY: WITHIN THE PAST 12 MONTHS, THE FOOD YOU BOUGHT JUST DIDN'T LAST AND YOU DIDN'T HAVE MONEY TO GET MORE.: NEVER TRUE

## 2024-06-18 SDOH — ECONOMIC STABILITY: FOOD INSECURITY: WITHIN THE PAST 12 MONTHS, YOU WORRIED THAT YOUR FOOD WOULD RUN OUT BEFORE YOU GOT MONEY TO BUY MORE.: NEVER TRUE

## 2024-06-18 ASSESSMENT — ENCOUNTER SYMPTOMS
VOICE CHANGE: 0
RHINORRHEA: 0
COUGH: 0
SHORTNESS OF BREATH: 0
VOMITING: 0
PHOTOPHOBIA: 0
NAUSEA: 0
BACK PAIN: 0
COLOR CHANGE: 0

## 2024-06-18 ASSESSMENT — PATIENT HEALTH QUESTIONNAIRE - PHQ9
SUM OF ALL RESPONSES TO PHQ QUESTIONS 1-9: 0
SUM OF ALL RESPONSES TO PHQ9 QUESTIONS 1 & 2: 0
2. FEELING DOWN, DEPRESSED OR HOPELESS: NOT AT ALL
SUM OF ALL RESPONSES TO PHQ QUESTIONS 1-9: 0
1. LITTLE INTEREST OR PLEASURE IN DOING THINGS: NOT AT ALL

## 2024-06-18 NOTE — PROGRESS NOTES
[facility]  [address]  [address]  [place] [address]  Dept: [phone]  Dept Fax: [phone]  Loc: [phone]    Jimmy Pyle is a 43 y.o. male who presents today for his medical conditions/complaints as noted below.  Jimmy Pyle is c/o of 6 Month Follow-Up (Pt in office for 6 month follow up - having sinus drainage)        HPI:     HPI   Chief Complaint   Patient presents with    6 Month Follow-Up     Pt in office for 6 month follow up - having sinus drainage     Patient presents today for annual exam and follow-up OA/gout, hyperlipidemia. Patient history of Down's syndrome; father present with him today as historian. Patient is due for labs. Lipids were elevated in December 2023; he is currently taking simvastatin 20 mg.     Father reports patient has had recent sinus drainage. He has taken antihistamine and flonase for symptoms. Denies fever or cough. He reports OTC meds have helped. No shortness of breath noted.       Lab Results   Component Value Date    CHOL 223 (H) 12/14/2023    TRIG 93 12/14/2023    HDL 60 12/14/2023     12/14/2023         Past Medical History:   Diagnosis Date    Back stiffness     Down's syndrome     Osteoarthritis     Stiffness in joint     bilateral legs      Past Surgical History:   Procedure Laterality Date    CARDIAC SURGERY      septal repair           6/18/2024     9:38 AM 12/14/2023     9:12 AM 6/14/2023     9:41 AM 12/14/2022     9:27 AM 10/24/2022     9:09 AM 6/13/2022    10:23 AM   Vitals   SYSTOLIC 130 128 128 116 118 118   DIASTOLIC 84 80 74 74 72 70   Site   Left Upper Arm   Left Lower Arm   Position   Sitting   Sitting   Cuff Size   Large Adult      Pulse 78 79 105 76 84 100   Temp 97 °F (36.1 °C)  98.3 °F (36.8 °C) 97.6 °F (36.4 °C) 97.4 °F (36.3 °C) 96.8 °F (36 °C)   SpO2 96 % 96 % 96 % 99 % 99 % 81 %   Weight - Scale 252 lb 9.6 oz 252 lb 250 lb 254 lb 255 lb 258 lb   Height  5' 2\" 5' 2\" 5' 5\" 5' 5\" 5'

## 2024-07-02 DIAGNOSIS — E55.9 VITAMIN D DEFICIENCY: Primary | ICD-10-CM

## 2024-07-02 RX ORDER — ERGOCALCIFEROL 1.25 MG/1
50000 CAPSULE ORAL WEEKLY
Qty: 12 CAPSULE | Refills: 1 | Status: SHIPPED | OUTPATIENT
Start: 2024-07-02

## 2024-12-18 ENCOUNTER — OFFICE VISIT (OUTPATIENT)
Dept: PRIMARY CARE CLINIC | Age: 44
End: 2024-12-18

## 2024-12-18 VITALS
HEART RATE: 82 BPM | TEMPERATURE: 97.3 F | SYSTOLIC BLOOD PRESSURE: 132 MMHG | BODY MASS INDEX: 46.59 KG/M2 | WEIGHT: 253.2 LBS | OXYGEN SATURATION: 99 % | HEIGHT: 62 IN | DIASTOLIC BLOOD PRESSURE: 80 MMHG

## 2024-12-18 DIAGNOSIS — Q90.9 DOWN'S SYNDROME: ICD-10-CM

## 2024-12-18 DIAGNOSIS — E78.2 MIXED HYPERLIPIDEMIA: ICD-10-CM

## 2024-12-18 DIAGNOSIS — H61.22 LEFT EAR IMPACTED CERUMEN: ICD-10-CM

## 2024-12-18 DIAGNOSIS — H91.93 DECREASED HEARING OF BOTH EARS: Primary | ICD-10-CM

## 2024-12-18 DIAGNOSIS — E55.9 VITAMIN D DEFICIENCY: ICD-10-CM

## 2024-12-18 LAB
25(OH)D3 SERPL-MCNC: 37.5 NG/ML
ALBUMIN SERPL-MCNC: 3.7 G/DL (ref 3.5–5.2)
ALP SERPL-CCNC: 120 U/L (ref 40–129)
ALT SERPL-CCNC: 24 U/L (ref 5–41)
ANION GAP SERPL CALCULATED.3IONS-SCNC: 9 MMOL/L (ref 7–19)
AST SERPL-CCNC: 21 U/L (ref 5–40)
BASOPHILS # BLD: 0.1 K/UL (ref 0–0.2)
BASOPHILS NFR BLD: 1.7 % (ref 0–1)
BILIRUB SERPL-MCNC: 0.3 MG/DL (ref 0.2–1.2)
BUN SERPL-MCNC: 25 MG/DL (ref 6–20)
CALCIUM SERPL-MCNC: 9.1 MG/DL (ref 8.6–10)
CHLORIDE SERPL-SCNC: 106 MMOL/L (ref 98–111)
CHOLEST SERPL-MCNC: 199 MG/DL (ref 0–199)
CO2 SERPL-SCNC: 27 MMOL/L (ref 22–29)
CREAT SERPL-MCNC: 1.2 MG/DL (ref 0.7–1.2)
EOSINOPHIL # BLD: 0.1 K/UL (ref 0–0.6)
EOSINOPHIL NFR BLD: 1.4 % (ref 0–5)
ERYTHROCYTE [DISTWIDTH] IN BLOOD BY AUTOMATED COUNT: 15.5 % (ref 11.5–14.5)
GLUCOSE SERPL-MCNC: 103 MG/DL (ref 70–99)
HBA1C MFR BLD: 6.2 % (ref 4–5.6)
HCT VFR BLD AUTO: 41.7 % (ref 42–52)
HDLC SERPL-MCNC: 56 MG/DL (ref 40–60)
HGB BLD-MCNC: 13.6 G/DL (ref 14–18)
IMM GRANULOCYTES # BLD: 0 K/UL
LDLC SERPL CALC-MCNC: 127 MG/DL
LYMPHOCYTES # BLD: 1.6 K/UL (ref 1.1–4.5)
LYMPHOCYTES NFR BLD: 43.8 % (ref 20–40)
MCH RBC QN AUTO: 30.1 PG (ref 27–31)
MCHC RBC AUTO-ENTMCNC: 32.6 G/DL (ref 33–37)
MCV RBC AUTO: 92.3 FL (ref 80–94)
MONOCYTES # BLD: 0.5 K/UL (ref 0–0.9)
MONOCYTES NFR BLD: 13.6 % (ref 0–10)
NEUTROPHILS # BLD: 1.4 K/UL (ref 1.5–7.5)
NEUTS SEG NFR BLD: 39.2 % (ref 50–65)
PLATELET # BLD AUTO: 322 K/UL (ref 130–400)
PMV BLD AUTO: 9.8 FL (ref 9.4–12.4)
POTASSIUM SERPL-SCNC: 4.6 MMOL/L (ref 3.5–5)
PROT SERPL-MCNC: 7.4 G/DL (ref 6.4–8.3)
RBC # BLD AUTO: 4.52 M/UL (ref 4.7–6.1)
SODIUM SERPL-SCNC: 142 MMOL/L (ref 136–145)
TRIGL SERPL-MCNC: 78 MG/DL (ref 0–149)
TSH SERPL DL<=0.005 MIU/L-ACNC: 2.41 UIU/ML (ref 0.27–4.2)
WBC # BLD AUTO: 3.5 K/UL (ref 4.8–10.8)

## 2024-12-18 RX ORDER — CIPROFLOXACIN AND DEXAMETHASONE 3; 1 MG/ML; MG/ML
4 SUSPENSION/ DROPS AURICULAR (OTIC) 2 TIMES DAILY
Qty: 1 EACH | Refills: 0 | Status: SHIPPED | OUTPATIENT
Start: 2024-12-18 | End: 2024-12-25

## 2024-12-18 RX ORDER — BUDESONIDE AND FORMOTEROL FUMARATE DIHYDRATE 160; 4.5 UG/1; UG/1
2 AEROSOL RESPIRATORY (INHALATION) 2 TIMES DAILY
Qty: 10.2 G | Refills: 2 | Status: SHIPPED | OUTPATIENT
Start: 2024-12-18

## 2024-12-18 RX ORDER — ALBUTEROL SULFATE 90 UG/1
2 INHALANT RESPIRATORY (INHALATION) 4 TIMES DAILY PRN
Qty: 18 G | Refills: 1 | Status: SHIPPED | OUTPATIENT
Start: 2024-12-18

## 2024-12-18 ASSESSMENT — ENCOUNTER SYMPTOMS
PHOTOPHOBIA: 0
VOICE CHANGE: 0
BACK PAIN: 0
RHINORRHEA: 0
VOMITING: 0
COUGH: 0
SHORTNESS OF BREATH: 0
COLOR CHANGE: 0
NAUSEA: 0

## 2024-12-18 NOTE — PATIENT INSTRUCTIONS
Referral to audiology.   Fasting labs in [address].   Follow-up in 6 months or sooner if needed.   Ciprodex drops to left ear twice daily x 7 days.

## 2024-12-18 NOTE — PROGRESS NOTES
capsule 1    ammonium lactate (LAC-HYDRIN) 12 % lotion Apply topically to affected area(s) daily. 400 g 11    allopurinol (ZYLOPRIM) 300 MG tablet Take 1 tablet by mouth Daily. 30 tablet 1    indomethacin (INDOCIN) 25 MG capsule Take 1-2 capsules by mouth three times daily with food as needed for inflammation and pain. 60 capsule 1    simvastatin (ZOCOR) 20 MG tablet Take 1 tablet by mouth nightly 30 tablet 5    vitamin C (ASCORBIC ACID) 500 MG tablet Take 1 tablet by mouth daily      vitamin D 25 MCG (1000 UT) CAPS Take 2 capsules by mouth daily      zinc sulfate (ZINCATE) 220 (50 Zn) MG capsule Take 220 mg by mouth daily       No current facility-administered medications on file prior to visit.     Allergies   Allergen Reactions    Seasonal        Health Maintenance   Topic Date Due    Varicella vaccine (1 of 2 - 13+ 2-dose series) Never done    Hepatitis B vaccine (1 of 3 - 19+ 3-dose series) Never done    Flu vaccine (1) 08/01/2024    COVID-19 Vaccine (2 - 2023-24 season) 09/01/2024    Lipids  06/18/2025    Depression Screen  06/18/2025    DTaP/Tdap/Td vaccine (2 - Td or Tdap) 03/08/2027    Hepatitis C screen  Completed    HIV screen  Completed    Hepatitis A vaccine  Aged Out    Hib vaccine  Aged Out    HPV vaccine  Aged Out    Polio vaccine  Aged Out    Meningococcal (ACWY) vaccine  Aged Out    Pneumococcal 0-64 years Vaccine  Aged Out    A1C test (Diabetic or Prediabetic)  Discontinued       Subjective:      Review of Systems   Constitutional:  Negative for chills and fever.   HENT:  Positive for hearing loss. Negative for ear pain, rhinorrhea and voice change.    Eyes:  Negative for photophobia and visual disturbance.   Respiratory:  Negative for cough and shortness of breath.    Cardiovascular:  Negative for chest pain and palpitations.   Gastrointestinal:  Negative for nausea and vomiting.   Endocrine: Negative.  Negative for cold intolerance and heat intolerance.   Genitourinary:  Negative for difficulty

## 2025-02-26 ENCOUNTER — HOSPITAL ENCOUNTER (INPATIENT)
Age: 45
LOS: 1 days | Discharge: HOME | DRG: 683 | End: 2025-02-28
Attending: EMERGENCY MEDICINE | Admitting: HOSPITALIST
Payer: MEDICAID

## 2025-02-26 ENCOUNTER — APPOINTMENT (OUTPATIENT)
Dept: CT IMAGING | Age: 45
DRG: 683 | End: 2025-02-26
Payer: MEDICAID

## 2025-02-26 ENCOUNTER — APPOINTMENT (OUTPATIENT)
Dept: GENERAL RADIOLOGY | Age: 45
DRG: 683 | End: 2025-02-26
Payer: MEDICAID

## 2025-02-26 DIAGNOSIS — H61.22 IMPACTED CERUMEN OF LEFT EAR: Primary | ICD-10-CM

## 2025-02-26 DIAGNOSIS — A08.11 NOROVIRUS: ICD-10-CM

## 2025-02-26 DIAGNOSIS — R19.7 VOMITING AND DIARRHEA: ICD-10-CM
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
## 2025-02-26 DIAGNOSIS — N17.9 AKI (ACUTE KIDNEY INJURY): ICD-10-CM

## 2025-02-26 DIAGNOSIS — R94.31 ABNORMAL EKG: ICD-10-CM

## 2025-02-26 DIAGNOSIS — R55 SYNCOPE AND COLLAPSE: ICD-10-CM

## 2025-02-26 DIAGNOSIS — R11.10 VOMITING AND DIARRHEA: ICD-10-CM

## 2025-02-26 PROCEDURE — 93005 ELECTROCARDIOGRAM TRACING: CPT | Performed by: EMERGENCY MEDICINE

## 2025-02-26 PROCEDURE — 71045 X-RAY EXAM CHEST 1 VIEW: CPT

## 2025-02-26 PROCEDURE — 99285 EMERGENCY DEPT VISIT HI MDM: CPT

## 2025-02-26 PROCEDURE — 72170 X-RAY EXAM OF PELVIS: CPT

## 2025-02-27 ENCOUNTER — APPOINTMENT (OUTPATIENT)
Dept: CT IMAGING | Age: 45
DRG: 683 | End: 2025-02-27
Payer: MEDICAID

## 2025-02-27 PROBLEM — N17.9 AKI (ACUTE KIDNEY INJURY): Status: ACTIVE | Noted: 2025-02-27

## 2025-02-27 PROBLEM — A08.11 NOROVIRUS: Status: ACTIVE | Noted: 2025-02-27

## 2025-02-27 PROBLEM — R19.7 DIARRHEA OF PRESUMED INFECTIOUS ORIGIN: Status: ACTIVE | Noted: 2025-02-27

## 2025-02-27 LAB
ADV 40+41 DNA STL QL NAA+NON-PROBE: NOT DETECTED
ALBUMIN SERPL-MCNC: 4 G/DL (ref 3.5–5.2)
ALP SERPL-CCNC: 121 U/L (ref 40–129)
ALT SERPL-CCNC: 19 U/L (ref 5–41)
ANION GAP SERPL CALCULATED.3IONS-SCNC: 11 MMOL/L (ref 8–16)
AST SERPL-CCNC: 21 U/L (ref 5–40)
B PARAP IS1001 DNA NPH QL NAA+NON-PROBE: NOT DETECTED
B PERT.PT PRMT NPH QL NAA+NON-PROBE: NOT DETECTED
BACTERIA #/AREA URNS HPF: ABNORMAL /HPF
BASOPHILS # BLD: 0 K/UL (ref 0–0.2)
BASOPHILS NFR BLD: 0.6 % (ref 0–1)
BILIRUB SERPL-MCNC: 0.2 MG/DL (ref 0.2–1.2)
BILIRUB UR QL STRIP: NEGATIVE
BUN SERPL-MCNC: 27 MG/DL (ref 6–20)
C CAYETANENSIS DNA STL QL NAA+NON-PROBE: NOT DETECTED
C COLI+JEJ+UPSA DNA STL QL NAA+NON-PROBE: NOT DETECTED
C DIFF TOX A+B STL QL IA: NORMAL
C PNEUM DNA NPH QL NAA+NON-PROBE: NOT DETECTED
CALCIUM SERPL-MCNC: 9.7 MG/DL (ref 8.6–10)
CHLORIDE SERPL-SCNC: 103 MMOL/L (ref 98–107)
CLARITY UR: ABNORMAL
CO2 SERPL-SCNC: 25 MMOL/L (ref 22–29)
COLOR UR: YELLOW
CREAT SERPL-MCNC: 1.5 MG/DL (ref 0.7–1.2)
CRYPTOSP DNA STL QL NAA+NON-PROBE: NOT DETECTED
E HISTOLYT DNA STL QL NAA+NON-PROBE: NOT DETECTED
EAEC PAA PLAS AGGR+AATA ST NAA+NON-PRB: NOT DETECTED
EC STX1+STX2 GENES STL QL NAA+NON-PROBE: NOT DETECTED
EKG P AXIS: 40 DEGREES
EKG P AXIS: 50 DEGREES
EKG P-R INTERVAL: 152 MS
EKG P-R INTERVAL: 154 MS
EKG Q-T INTERVAL: 372 MS
EKG Q-T INTERVAL: 434 MS
EKG QRS DURATION: 78 MS
EKG QRS DURATION: 84 MS
EKG QTC CALCULATION (BAZETT): 431 MS
EKG QTC CALCULATION (BAZETT): 446 MS
EKG T AXIS: -1 DEGREES
EKG T AXIS: 14 DEGREES
EOSINOPHIL # BLD: 0 K/UL (ref 0–0.6)
EOSINOPHIL NFR BLD: 0.6 % (ref 0–5)
EPEC EAE GENE STL QL NAA+NON-PROBE: NOT DETECTED
ERYTHROCYTE [DISTWIDTH] IN BLOOD BY AUTOMATED COUNT: 14.6 % (ref 11.5–14.5)
ETEC LTA+ST1A+ST1B TOX ST NAA+NON-PROBE: NOT DETECTED
FLUAV RNA NPH QL NAA+NON-PROBE: NOT DETECTED
FLUBV RNA NPH QL NAA+NON-PROBE: NOT DETECTED
G LAMBLIA DNA STL QL NAA+NON-PROBE: NOT DETECTED
GI PATH DNA+RNA PNL STL NAA+NON-PROBE: NOT DETECTED
GLUCOSE SERPL-MCNC: 96 MG/DL (ref 70–99)
GLUCOSE UR STRIP.AUTO-MCNC: NEGATIVE MG/DL
HADV DNA NPH QL NAA+NON-PROBE: NOT DETECTED
HCOV 229E RNA NPH QL NAA+NON-PROBE: NOT DETECTED
HCOV HKU1 RNA NPH QL NAA+NON-PROBE: NOT DETECTED
HCOV NL63 RNA NPH QL NAA+NON-PROBE: NOT DETECTED
HCOV OC43 RNA NPH QL NAA+NON-PROBE: NOT DETECTED
HCT VFR BLD AUTO: 39.6 % (ref 42–52)
HGB BLD-MCNC: 13.2 G/DL (ref 14–18)
HGB UR STRIP.AUTO-MCNC: NEGATIVE MG/L
HMPV RNA NPH QL NAA+NON-PROBE: NOT DETECTED
HPIV1 RNA NPH QL NAA+NON-PROBE: NOT DETECTED
HPIV2 RNA NPH QL NAA+NON-PROBE: NOT DETECTED
HPIV3 RNA NPH QL NAA+NON-PROBE: NOT DETECTED
HPIV4 RNA NPH QL NAA+NON-PROBE: NOT DETECTED
HYALINE CASTS #/AREA URNS LPF: ABNORMAL /LPF (ref 0–5)
IMM GRANULOCYTES # BLD: 0 K/UL
KETONES UR STRIP.AUTO-MCNC: ABNORMAL MG/DL
LEUKOCYTE ESTERASE UR QL STRIP.AUTO: ABNORMAL
LYMPHOCYTES # BLD: 0.8 K/UL (ref 1.1–4.5)
LYMPHOCYTES NFR BLD: 11.4 % (ref 20–40)
M PNEUMO DNA NPH QL NAA+NON-PROBE: NOT DETECTED
MCH RBC QN AUTO: 30.3 PG (ref 27–31)
MCHC RBC AUTO-ENTMCNC: 33.3 G/DL (ref 33–37)
MCV RBC AUTO: 90.8 FL (ref 80–94)
MONOCYTES # BLD: 0.7 K/UL (ref 0–0.9)
MONOCYTES NFR BLD: 9.1 % (ref 0–10)
NEUTROPHILS # BLD: 5.6 K/UL (ref 1.5–7.5)
NEUTS SEG NFR BLD: 77.7 % (ref 50–65)
NITRITE UR QL STRIP.AUTO: NEGATIVE
NOROVIRUS GI+II RNA STL QL NAA+NON-PROBE: DETECTED
P SHIGELLOIDES DNA STL QL NAA+NON-PROBE: NOT DETECTED
PH UR STRIP.AUTO: 5 [PH] (ref 5–8)
PLATELET # BLD AUTO: 295 K/UL (ref 130–400)
PMV BLD AUTO: 9.1 FL (ref 9.4–12.4)
POTASSIUM SERPL-SCNC: 4.9 MMOL/L (ref 3.5–5.1)
PROT SERPL-MCNC: 8.2 G/DL (ref 6.4–8.3)
PROT UR STRIP.AUTO-MCNC: ABNORMAL MG/DL
RBC # BLD AUTO: 4.36 M/UL (ref 4.7–6.1)
RBC #/AREA URNS HPF: ABNORMAL /HPF (ref 0–2)
RSV RNA NPH QL NAA+NON-PROBE: NOT DETECTED
RV+EV RNA NPH QL NAA+NON-PROBE: NOT DETECTED
RVA RNA STL QL NAA+NON-PROBE: NOT DETECTED
S ENT+BONG DNA STL QL NAA+NON-PROBE: NOT DETECTED
SAPO I+II+IV+V RNA STL QL NAA+NON-PROBE: NOT DETECTED
SARS-COV-2 RNA NPH QL NAA+NON-PROBE: NOT DETECTED
SHIGELLA SP+EIEC IPAH ST NAA+NON-PROBE: NOT DETECTED
SODIUM SERPL-SCNC: 139 MMOL/L (ref 136–145)
SP GR UR STRIP.AUTO: 1.02 (ref 1–1.03)
SQUAMOUS #/AREA URNS HPF: ABNORMAL /HPF
T4 FREE SERPL-MCNC: 1.02 NG/DL (ref 0.93–1.7)
TROPONIN, HIGH SENSITIVITY: <6 NG/L (ref 0–22)
TROPONIN, HIGH SENSITIVITY: <6 NG/L (ref 0–22)
TSH SERPL DL<=0.005 MIU/L-ACNC: 3.47 UIU/ML (ref 0.27–4.2)
UROBILINOGEN UR STRIP.AUTO-MCNC: 0.2 E.U./DL
V CHOL+PARA+VUL DNA STL QL NAA+NON-PROBE: NOT DETECTED
V CHOLERAE DNA STL QL NAA+NON-PROBE: NOT DETECTED
WBC # BLD AUTO: 7.3 K/UL (ref 4.8–10.8)
WBC #/AREA URNS HPF: ABNORMAL /HPF (ref 0–5)
Y ENTEROCOL DNA STL QL NAA+NON-PROBE: NOT DETECTED

## 2025-02-27 PROCEDURE — 70450 CT HEAD/BRAIN W/O DYE: CPT

## 2025-02-27 PROCEDURE — 6370000000 HC RX 637 (ALT 250 FOR IP): Performed by: HOSPITALIST

## 2025-02-27 PROCEDURE — 87449 NOS EACH ORGANISM AG IA: CPT

## 2025-02-27 PROCEDURE — 6360000002 HC RX W HCPCS: Performed by: HOSPITALIST

## 2025-02-27 PROCEDURE — 80053 COMPREHEN METABOLIC PANEL: CPT

## 2025-02-27 PROCEDURE — 84484 ASSAY OF TROPONIN QUANT: CPT

## 2025-02-27 PROCEDURE — 74176 CT ABD & PELVIS W/O CONTRAST: CPT

## 2025-02-27 PROCEDURE — 96374 THER/PROPH/DIAG INJ IV PUSH: CPT

## 2025-02-27 PROCEDURE — 81001 URINALYSIS AUTO W/SCOPE: CPT

## 2025-02-27 PROCEDURE — 87086 URINE CULTURE/COLONY COUNT: CPT

## 2025-02-27 PROCEDURE — 85025 COMPLETE CBC W/AUTO DIFF WBC: CPT

## 2025-02-27 PROCEDURE — 84443 ASSAY THYROID STIM HORMONE: CPT

## 2025-02-27 PROCEDURE — 72125 CT NECK SPINE W/O DYE: CPT

## 2025-02-27 PROCEDURE — 87077 CULTURE AEROBIC IDENTIFY: CPT

## 2025-02-27 PROCEDURE — 87324 CLOSTRIDIUM AG IA: CPT

## 2025-02-27 PROCEDURE — 87507 IADNA-DNA/RNA PROBE TQ 12-25: CPT

## 2025-02-27 PROCEDURE — 2580000003 HC RX 258: Performed by: HOSPITALIST

## 2025-02-27 PROCEDURE — 93005 ELECTROCARDIOGRAM TRACING: CPT | Performed by: EMERGENCY MEDICINE

## 2025-02-27 PROCEDURE — 94640 AIRWAY INHALATION TREATMENT: CPT

## 2025-02-27 PROCEDURE — 84439 ASSAY OF FREE THYROXINE: CPT

## 2025-02-27 PROCEDURE — 96376 TX/PRO/DX INJ SAME DRUG ADON: CPT

## 2025-02-27 PROCEDURE — 2500000003 HC RX 250 WO HCPCS: Performed by: EMERGENCY MEDICINE

## 2025-02-27 PROCEDURE — 0202U NFCT DS 22 TRGT SARS-COV-2: CPT

## 2025-02-27 PROCEDURE — 36415 COLL VENOUS BLD VENIPUNCTURE: CPT

## 2025-02-27 PROCEDURE — 96375 TX/PRO/DX INJ NEW DRUG ADDON: CPT

## 2025-02-27 PROCEDURE — 1200000000 HC SEMI PRIVATE

## 2025-02-27 PROCEDURE — 6360000002 HC RX W HCPCS: Performed by: EMERGENCY MEDICINE

## 2025-02-27 PROCEDURE — 2580000003 HC RX 258: Performed by: EMERGENCY MEDICINE

## 2025-02-27 RX ORDER — ACETAMINOPHEN 650 MG/1
650 SUPPOSITORY RECTAL EVERY 4 HOURS PRN
Status: DISCONTINUED | OUTPATIENT
Start: 2025-02-27 | End: 2025-02-28 | Stop reason: HOSPADM

## 2025-02-27 RX ORDER — BUDESONIDE 0.5 MG/2ML
0.5 INHALANT ORAL
Status: DISCONTINUED | OUTPATIENT
Start: 2025-02-27 | End: 2025-02-27

## 2025-02-27 RX ORDER — 0.9 % SODIUM CHLORIDE 0.9 %
500 INTRAVENOUS SOLUTION INTRAVENOUS ONCE
Status: COMPLETED | OUTPATIENT
Start: 2025-02-27 | End: 2025-02-27

## 2025-02-27 RX ORDER — ONDANSETRON 2 MG/ML
4 INJECTION INTRAMUSCULAR; INTRAVENOUS ONCE
Status: COMPLETED | OUTPATIENT
Start: 2025-02-27 | End: 2025-02-27

## 2025-02-27 RX ORDER — ARFORMOTEROL TARTRATE 15 UG/2ML
15 SOLUTION RESPIRATORY (INHALATION) 2 TIMES DAILY PRN
Status: DISCONTINUED | OUTPATIENT
Start: 2025-02-27 | End: 2025-02-28 | Stop reason: HOSPADM

## 2025-02-27 RX ORDER — SODIUM CHLORIDE 0.9 % (FLUSH) 0.9 %
5-40 SYRINGE (ML) INJECTION EVERY 12 HOURS SCHEDULED
Status: DISCONTINUED | OUTPATIENT
Start: 2025-02-27 | End: 2025-02-28 | Stop reason: HOSPADM

## 2025-02-27 RX ORDER — SODIUM CHLORIDE 0.9 % (FLUSH) 0.9 %
5-40 SYRINGE (ML) INJECTION PRN
Status: DISCONTINUED | OUTPATIENT
Start: 2025-02-27 | End: 2025-02-28 | Stop reason: HOSPADM

## 2025-02-27 RX ORDER — ARFORMOTEROL TARTRATE 15 UG/2ML
15 SOLUTION RESPIRATORY (INHALATION)
Status: DISCONTINUED | OUTPATIENT
Start: 2025-02-27 | End: 2025-02-27

## 2025-02-27 RX ORDER — ONDANSETRON 4 MG/1
4 TABLET, ORALLY DISINTEGRATING ORAL EVERY 8 HOURS PRN
Status: DISCONTINUED | OUTPATIENT
Start: 2025-02-27 | End: 2025-02-28 | Stop reason: HOSPADM

## 2025-02-27 RX ORDER — ZINC SULFATE 50(220)MG
220 CAPSULE ORAL DAILY
Status: DISCONTINUED | OUTPATIENT
Start: 2025-02-27 | End: 2025-02-28 | Stop reason: HOSPADM

## 2025-02-27 RX ORDER — SODIUM CHLORIDE 9 MG/ML
INJECTION, SOLUTION INTRAVENOUS CONTINUOUS
Status: DISCONTINUED | OUTPATIENT
Start: 2025-02-27 | End: 2025-02-28 | Stop reason: HOSPADM

## 2025-02-27 RX ORDER — VITAMIN B COMPLEX
2000 TABLET ORAL DAILY
Status: DISCONTINUED | OUTPATIENT
Start: 2025-02-27 | End: 2025-02-28 | Stop reason: HOSPADM

## 2025-02-27 RX ORDER — ASCORBIC ACID 500 MG
500 TABLET ORAL DAILY
Status: DISCONTINUED | OUTPATIENT
Start: 2025-02-27 | End: 2025-02-28 | Stop reason: HOSPADM

## 2025-02-27 RX ORDER — SODIUM CHLORIDE 9 MG/ML
INJECTION, SOLUTION INTRAVENOUS PRN
Status: DISCONTINUED | OUTPATIENT
Start: 2025-02-27 | End: 2025-02-28 | Stop reason: HOSPADM

## 2025-02-27 RX ORDER — ENOXAPARIN SODIUM 100 MG/ML
30 INJECTION SUBCUTANEOUS 2 TIMES DAILY
Status: DISCONTINUED | OUTPATIENT
Start: 2025-02-27 | End: 2025-02-28 | Stop reason: HOSPADM

## 2025-02-27 RX ORDER — ACETAMINOPHEN 325 MG/1
650 TABLET ORAL EVERY 4 HOURS PRN
Status: DISCONTINUED | OUTPATIENT
Start: 2025-02-27 | End: 2025-02-28 | Stop reason: HOSPADM

## 2025-02-27 RX ORDER — AMMONIUM LACTATE 12 G/100G
CREAM TOPICAL PRN
Status: DISCONTINUED | OUTPATIENT
Start: 2025-02-27 | End: 2025-02-28 | Stop reason: HOSPADM

## 2025-02-27 RX ORDER — ONDANSETRON 2 MG/ML
4 INJECTION INTRAMUSCULAR; INTRAVENOUS EVERY 6 HOURS PRN
Status: DISCONTINUED | OUTPATIENT
Start: 2025-02-27 | End: 2025-02-28 | Stop reason: HOSPADM

## 2025-02-27 RX ORDER — ALBUTEROL SULFATE 0.83 MG/ML
2.5 SOLUTION RESPIRATORY (INHALATION) EVERY 4 HOURS PRN
Status: DISCONTINUED | OUTPATIENT
Start: 2025-02-27 | End: 2025-02-28 | Stop reason: HOSPADM

## 2025-02-27 RX ORDER — BUDESONIDE 0.5 MG/2ML
0.5 INHALANT ORAL 2 TIMES DAILY PRN
Status: DISCONTINUED | OUTPATIENT
Start: 2025-02-27 | End: 2025-02-28 | Stop reason: HOSPADM

## 2025-02-27 RX ORDER — ATORVASTATIN CALCIUM 20 MG/1
10 TABLET, FILM COATED ORAL NIGHTLY
Status: DISCONTINUED | OUTPATIENT
Start: 2025-02-27 | End: 2025-02-28 | Stop reason: HOSPADM

## 2025-02-27 RX ADMIN — OXYCODONE HYDROCHLORIDE AND ACETAMINOPHEN 500 MG: 500 TABLET ORAL at 10:05

## 2025-02-27 RX ADMIN — ENOXAPARIN SODIUM 30 MG: 100 INJECTION SUBCUTANEOUS at 10:06

## 2025-02-27 RX ADMIN — ONDANSETRON 4 MG: 2 INJECTION INTRAMUSCULAR; INTRAVENOUS at 01:10

## 2025-02-27 RX ADMIN — ONDANSETRON 4 MG: 2 INJECTION INTRAMUSCULAR; INTRAVENOUS at 02:27

## 2025-02-27 RX ADMIN — SODIUM CHLORIDE 500 ML: 9 INJECTION, SOLUTION INTRAVENOUS at 10:09

## 2025-02-27 RX ADMIN — SODIUM CHLORIDE: 9 INJECTION, SOLUTION INTRAVENOUS at 11:30

## 2025-02-27 RX ADMIN — Medication 2000 UNITS: at 10:05

## 2025-02-27 RX ADMIN — SODIUM CHLORIDE 500 ML: 9 INJECTION, SOLUTION INTRAVENOUS at 01:11

## 2025-02-27 RX ADMIN — SODIUM CHLORIDE: 9 INJECTION, SOLUTION INTRAVENOUS at 17:24

## 2025-02-27 RX ADMIN — BUDESONIDE 500 MCG: 0.5 SUSPENSION RESPIRATORY (INHALATION) at 07:33

## 2025-02-27 RX ADMIN — ATORVASTATIN CALCIUM 10 MG: 20 TABLET, FILM COATED ORAL at 20:33

## 2025-02-27 RX ADMIN — ZINC SULFATE 220 MG (50 MG) CAPSULE 200 MG: CAPSULE at 10:05

## 2025-02-27 RX ADMIN — WATER 1000 MG: 1 INJECTION INTRAMUSCULAR; INTRAVENOUS; SUBCUTANEOUS at 06:03

## 2025-02-27 RX ADMIN — ENOXAPARIN SODIUM 30 MG: 100 INJECTION SUBCUTANEOUS at 20:33

## 2025-02-27 RX ADMIN — ARFORMOTEROL TARTRATE 15 MCG: 15 SOLUTION RESPIRATORY (INHALATION) at 07:32

## 2025-02-27 SDOH — ECONOMIC STABILITY: INCOME INSECURITY: IN THE PAST 12 MONTHS, HAS THE ELECTRIC, GAS, OIL, OR WATER COMPANY THREATENED TO SHUT OFF SERVICE IN YOUR HOME?: NO

## 2025-02-27 SDOH — ECONOMIC STABILITY: FOOD INSECURITY: WITHIN THE PAST 12 MONTHS, YOU WORRIED THAT YOUR FOOD WOULD RUN OUT BEFORE YOU GOT MONEY TO BUY MORE.: NEVER TRUE

## 2025-02-27 SDOH — ECONOMIC STABILITY: INCOME INSECURITY: HOW HARD IS IT FOR YOU TO PAY FOR THE VERY BASICS LIKE FOOD, HOUSING, MEDICAL CARE, AND HEATING?: NOT HARD AT ALL

## 2025-02-27 ASSESSMENT — PATIENT HEALTH QUESTIONNAIRE - PHQ9
SUM OF ALL RESPONSES TO PHQ QUESTIONS 1-9: 0
SUM OF ALL RESPONSES TO PHQ QUESTIONS 1-9: 0
2. FEELING DOWN, DEPRESSED OR HOPELESS: NOT AT ALL
1. LITTLE INTEREST OR PLEASURE IN DOING THINGS: NOT AT ALL
SUM OF ALL RESPONSES TO PHQ9 QUESTIONS 1 & 2: 0
SUM OF ALL RESPONSES TO PHQ QUESTIONS 1-9: 0
SUM OF ALL RESPONSES TO PHQ QUESTIONS 1-9: 0

## 2025-02-27 ASSESSMENT — ENCOUNTER SYMPTOMS
DIARRHEA: 1
ABDOMINAL PAIN: 1
VOMITING: 0
ABDOMINAL DISTENTION: 1
NAUSEA: 0
SHORTNESS OF BREATH: 0

## 2025-02-27 ASSESSMENT — PAIN SCALES - GENERAL: PAINLEVEL_OUTOF10: 3

## 2025-02-27 ASSESSMENT — PAIN DESCRIPTION - LOCATION: LOCATION: ABDOMEN

## 2025-02-27 NOTE — H&P
Mental Status: He is alert.      Motor: No tremor or seizure activity.   Psychiatric:         Mood and Affect: Mood normal.         Behavior: Behavior normal.        LABORATORY DATA:    CBC:   Recent Labs     02/26/25 2355   WBC 7.3   HGB 13.2*   HCT 39.6*        BMP:   Recent Labs     02/26/25 2355      K 4.9      CO2 25   BUN 27*   CREATININE 1.5*   CALCIUM 9.7     Hepatic Profile:   Recent Labs     02/26/25 2355   AST 21   ALT 19   BILITOT 0.2   ALKPHOS 121     Urinalysis:   Lab Results   Component Value Date/Time    NITRU Negative 02/27/2025 12:09 AM    WBCUA 3-5 02/27/2025 12:09 AM    BACTERIA 1+ 02/27/2025 12:09 AM    RBCUA 2-4 02/27/2025 12:09 AM    BLOODU Negative 02/27/2025 12:09 AM    GLUCOSEU Negative 02/27/2025 12:09 AM     EKG:   Not indicated    IMAGING:  XR PELVIS (1-2 VIEWS)  Result Date: 2/27/2025  No acute abnormality of the pelvis.    ______________________________________ Electronically signed by: EDI DAVIES M.D. Date:     02/27/2025 Time:    06:32     XR CHEST PORTABLE  Result Date: 2/27/2025  1.  No acute disease.    ______________________________________ Electronically signed by: JOSEPHINE DENISE D.O. Date:     02/27/2025 Time:    06:28     CT ABDOMEN PELVIS WO CONTRAST Additional Contrast? None  Result Date: 2/27/2025  1.  No urinary or bowel obstruction and normal appendix. 2.  Decompressed distal small bowel.  Air-fluid levels of the colon.  Correlate for enteritis/diarrhea. 3.  The stomach is distended with food contents.  Ileus not excluded. 4.  Chronic bilateral L5 pars defects.  All CT scans are performed using dose optimization techniques as appropriate to the performed exam and include at least one of the following: Automated exposure control, adjustment of the mA and/or kV according to size, and the use of iterative reconstruction technique.  ______________________________________ Electronically signed by: YOSEPH MACIEL M.D. Date:     02/27/2025 Time:    03:18

## 2025-02-27 NOTE — ED NOTES
Pt had large episode of light brown liquid diarrhea. Unable to obtain sample. Pt had large episode of emesis.

## 2025-02-27 NOTE — ED PROVIDER NOTES
[facility] EMERGENCY DEPARTMENT  eMERGENCY dEPARTMENT eNCOUnter      Pt Name: Jimmy Pyle  MRN: [id number]  Birthdate 1980  Date of evaluation: 2/26/2025  Provider: Marlo Perez MD    CHIEF COMPLAINT       Chief Complaint   Patient presents with    Seizures     Father heard thud in shower and found pt down in tub, states pt was responsive but concerned for seizure-like activity ; Down's syndrome, at baseline per father         HISTORY OF PRESENT ILLNESS   (Location/Symptom, Timing/Onset,Context/Setting, Quality, Duration, Modifying Factors, Severity)  Note limiting factors.   Jimmy Pyle is a 44 y.o. male who presents to the emergency department due to fall.  History is all from patient's father.  Patient has Down syndrome and communication with him is extremely limited.  This is all baseline per his father.  Patient was at [facility] today and when father picked him up he said that he seemed a little bit more fatigued than normal but then seemed fine.  Went home and was taking a shower and father heard him fall.  Went to check on him and patient was awake and alert and he helped him stand and then when he stepped out of the bathroom to get a towel patient fell again.  Father did not witness either fall but as soon as he went to the bathroom after he heard him fall patient was awake and alert.  Triaged as having a seizure but father never noted any seizure-like activity.  Unknown whether or not patient had any syncope/LOC but father feels pretty certain he did not. Tells me that patient has had issues before where he is passed out and/or fallen when he has been ill.  Had similar issues when he had COVID about a year ago.  He said other than seeming a little fatigued earlier today he has not had any symptoms to speak of that he is aware of.  Again, cannot get any real history from the patient.  Patient has a history of gout and Down syndrome but no other medical problems.  No history of cardiac

## 2025-02-27 NOTE — PROGRESS NOTES
4 Eyes Skin Assessment     NAME:  Jimmy Pyle  YOB: 1980  MEDICAL RECORD NUMBER:  [id number]    The patient is being assessed for  Admission    I agree that at least one RN has performed a thorough Head to Toe Skin Assessment on the patient. ALL assessment sites listed below have been assessed.      Areas assessed by both nurses:    Head, Face, Ears, Shoulders, Back, Chest, Arms, Elbows, Hands, Sacrum. Buttock, Coccyx, Ischium, and Legs. Feet and Heels        Does the Patient have a Wound? No noted wound(s)       Blade Prevention initiated by RN: No  Wound Care Orders initiated by RN: No    Pressure Injury (Stage 3,4, Unstageable, DTI, NWPT, and Complex wounds) if present, place Wound referral order by RN under : No    New Ostomies, if present place, Ostomy referral order under : No     Nurse 1 eSignature: Electronically signed by Melissa Crane RN on 2/27/25 at 5:13 PM CST    **SHARE this note so that the co-signing nurse can place an eSignature**    Nurse 2 eSignature: Electronically signed by Yudi Muse RN on 2/27/25 at 5:36 PM CST

## 2025-02-27 NOTE — ED NOTES
ED TO INPATIENT SBAR HANDOFF    Patient Name: Jimmy Pyle   : 1980  44 y.o.   Family/Caregiver Present: Yes  Code Status Order: Full Code    C-SSRS:    Sitter No  Restraints:         Situation  Chief Complaint:   Chief Complaint   Patient presents with    Seizures     Father heard thud in shower and found pt down in tub, states pt was responsive but concerned for seizure-like activity ; Down's syndrome, at baseline per father     Patient Diagnosis: CHANEL (acute kidney injury) [N17.9]     Brief Description of Patient's Condition: pt presents post seizure reported - pt alert at this time - calm and cooperative with care  Mental Status: alert, coherent, logical, and thought processes intact  Arrived from: home    Imaging:   CT ABDOMEN PELVIS WO CONTRAST Additional Contrast? None   Final Result   1.  No urinary or bowel obstruction and normal appendix.   2.  Decompressed distal small bowel.  Air-fluid levels of the colon.  Correlate for enteritis/diarrhea.   3.  The stomach is distended with food contents.  Ileus not excluded.   4.  Chronic bilateral L5 pars defects.        All CT scans are performed using dose optimization techniques as appropriate to the performed exam and include    at least one of the following: Automated exposure control, adjustment of the mA and/or kV according to size, and the use of iterative reconstruction technique.        ______________________________________    Electronically signed by: YOSEPH MACIEL M.D.   Date:     2025   Time:    03:18       XR CHEST PORTABLE   Final Result   1.  No acute disease.               ______________________________________    Electronically signed by: JOSEPHINE DENISE D.O.   Date:     2025   Time:    06:28       XR PELVIS (1-2 VIEWS)   Final Result   No acute abnormality of the pelvis.               ______________________________________    Electronically signed by: EDI DAVIES M.D.   Date:     2025   Time:    06:32       CT HEAD WO

## 2025-02-28 VITALS
HEIGHT: 68 IN | OXYGEN SATURATION: 100 % | HEART RATE: 83 BPM | SYSTOLIC BLOOD PRESSURE: 120 MMHG | BODY MASS INDEX: 38.09 KG/M2 | WEIGHT: 251.32 LBS | DIASTOLIC BLOOD PRESSURE: 72 MMHG | TEMPERATURE: 97.7 F | RESPIRATION RATE: 14 BRPM

## 2025-02-28 LAB
ANION GAP SERPL CALCULATED.3IONS-SCNC: 15 MMOL/L (ref 8–16)
BASOPHILS # BLD: 0.1 K/UL (ref 0–0.2)
BASOPHILS NFR BLD: 0.4 % (ref 0–1)
BUN SERPL-MCNC: 20 MG/DL (ref 6–20)
CALCIUM SERPL-MCNC: 8.7 MG/DL (ref 8.6–10)
CHLORIDE SERPL-SCNC: 107 MMOL/L (ref 98–107)
CO2 SERPL-SCNC: 19 MMOL/L (ref 22–29)
CREAT SERPL-MCNC: 1 MG/DL (ref 0.7–1.2)
EOSINOPHIL # BLD: 0.1 K/UL (ref 0–0.6)
EOSINOPHIL NFR BLD: 0.9 % (ref 0–5)
ERYTHROCYTE [DISTWIDTH] IN BLOOD BY AUTOMATED COUNT: 15.6 % (ref 11.5–14.5)
GLUCOSE SERPL-MCNC: 103 MG/DL (ref 70–99)
HCT VFR BLD AUTO: 39.9 % (ref 42–52)
HGB BLD-MCNC: 12.7 G/DL (ref 14–18)
IMM GRANULOCYTES # BLD: 0 K/UL
LYMPHOCYTES # BLD: 1.7 K/UL (ref 1.1–4.5)
LYMPHOCYTES NFR BLD: 14.2 % (ref 20–40)
MCH RBC QN AUTO: 30.9 PG (ref 27–31)
MCHC RBC AUTO-ENTMCNC: 31.8 G/DL (ref 33–37)
MCV RBC AUTO: 97.1 FL (ref 80–94)
MONOCYTES # BLD: 1.1 K/UL (ref 0–0.9)
MONOCYTES NFR BLD: 8.8 % (ref 0–10)
NEUTROPHILS # BLD: 9.2 K/UL (ref 1.5–7.5)
NEUTS SEG NFR BLD: 75.4 % (ref 50–65)
PLATELET # BLD AUTO: 266 K/UL (ref 130–400)
PMV BLD AUTO: 9.9 FL (ref 9.4–12.4)
POTASSIUM SERPL-SCNC: 4.9 MMOL/L (ref 3.5–5)
RBC # BLD AUTO: 4.11 M/UL (ref 4.7–6.1)
SODIUM SERPL-SCNC: 141 MMOL/L (ref 136–145)
WBC # BLD AUTO: 12.2 K/UL (ref 4.8–10.8)

## 2025-02-28 PROCEDURE — 6360000002 HC RX W HCPCS: Performed by: HOSPITALIST

## 2025-02-28 PROCEDURE — 6370000000 HC RX 637 (ALT 250 FOR IP): Performed by: HOSPITALIST

## 2025-02-28 PROCEDURE — 85025 COMPLETE CBC W/AUTO DIFF WBC: CPT

## 2025-02-28 PROCEDURE — 94760 N-INVAS EAR/PLS OXIMETRY 1: CPT

## 2025-02-28 PROCEDURE — 80048 BASIC METABOLIC PNL TOTAL CA: CPT

## 2025-02-28 PROCEDURE — 2500000003 HC RX 250 WO HCPCS: Performed by: HOSPITALIST

## 2025-02-28 PROCEDURE — 36415 COLL VENOUS BLD VENIPUNCTURE: CPT

## 2025-02-28 RX ADMIN — ENOXAPARIN SODIUM 30 MG: 100 INJECTION SUBCUTANEOUS at 10:17

## 2025-02-28 RX ADMIN — OXYCODONE HYDROCHLORIDE AND ACETAMINOPHEN 500 MG: 500 TABLET ORAL at 10:17

## 2025-02-28 RX ADMIN — SODIUM CHLORIDE, PRESERVATIVE FREE 10 ML: 5 INJECTION INTRAVENOUS at 11:16

## 2025-02-28 RX ADMIN — Medication 2000 UNITS: at 10:17

## 2025-02-28 ASSESSMENT — PAIN SCALES - GENERAL: PAINLEVEL_OUTOF10: 0

## 2025-03-01 LAB
BACTERIA UR CULT: ABNORMAL
ORGANISM: ABNORMAL
ORGANISM: ABNORMAL

## 2025-03-01 NOTE — DISCHARGE SUMMARY
Discharge Summary    NAME: Jimmy Pyle  :  1980  MRN:  [id number]    Admit date:  2025  Discharge date:  2025    Admitting Physician:  No admitting provider for patient encounter.    Advance Directive: Prior    Consults: none    Primary Care Physician:  Nel Chiu, APRN    Discharge Diagnoses:  Principal Problem:    CHANEL (acute kidney injury)  Active Problems:    Norovirus    Diarrhea of presumed infectious origin  Resolved Problems:    * No resolved hospital problems. *      Significant Diagnostic Studies:   XR PELVIS (1-2 VIEWS)    Result Date: 2025    EXAM:  SINGLE VIEW OF THE PELVIS  HISTORY: Fall.  COMPARISON:  CT abdomen and pelvis same day  FINDINGS: Pelvic ring is intact. The hips are intact. There is no lytic or blastic lesions. There is no displaced fracture or dislocation. The soft tissues are normal.      No acute abnormality of the pelvis.    ______________________________________ Electronically signed by: EDI DAVIES M.D. Date:     2025 Time:    06:32     XR CHEST PORTABLE    Result Date: 2025  EXAM: CHEST RADIOGRAPH  TECHNIQUE: Single frontal chest radiograph.  HISTORY: Chest pain  COMPARISON: None.  FINDINGS: The lungs are clear. The heart size is normal. The osseous structures are unremarkable.      1.  No acute disease.    ______________________________________ Electronically signed by: JOSEPHINE DENISE D.O. Date:     2025 Time:    06:28     CT ABDOMEN PELVIS WO CONTRAST Additional Contrast? None    Result Date: 2025  EXAM: CT ABDOMEN PELVIS WITHOUT INTRAVENOUS CONTRAST 2025.  SAGITTAL AND CORONAL REFORMATTED IMAGES OBTAINED  HISTORY: Vomiting and diarrhea  COMPARISON: None.  FINDINGS: The liver, gallbladder, adrenal glands and kidneys show no acute abnormality.  No hydronephrosis.  The spleen and pancreas show no acute abnormality.  No bowel obstruction.  Unremarkable urinary bladder.  No free air.  No free fluid.  Normal appendix.  Decompressed

## 2025-03-03 ENCOUNTER — TELEPHONE (OUTPATIENT)
Dept: PRIMARY CARE CLINIC | Age: 45
End: 2025-03-03

## 2025-03-03 NOTE — TELEPHONE ENCOUNTER
Care Transitions Initial Follow Up Call    Outreach made within 2 business days of discharge: Yes    Patient: Jimmy Pyle   Patient : 1980   MRN: [id number]    Reason for Admission: CHANEL (acute kidney injury)    Norovirus  Discharge Date: 25       Spoke with: Father    Discharge department/facility: [facility] Interactive Patient Contact:  Was patient able to fill all prescriptions: No: No new meds  Was patient instructed to bring all medications to the follow-up visit: Yes  Is patient taking all medications as directed in the discharge summary? Yes  Does patient understand their discharge instructions: Yes  Does patient have questions or concerns that need addressed prior to 7-14 day follow up office visit: no    Additional needs identified to be addressed with provider  No needs identified             Scheduled appointment with PCP within 7-14 days    Spoke with patient's father. He is doing very well. His appetite is very good and he was able to go to [facility] today. He voiced no needs or concerns at this time.     Follow Up  Future Appointments   Date Time Provider Department Center   3/6/2025  3:15 PM Aram, Nel M, APRN [facility] DEP   2025  9:00 AM Nel Chiu APRN [facility] DEP       Fidleina Wisdom MA

## 2025-03-06 ENCOUNTER — OFFICE VISIT (OUTPATIENT)
Dept: PRIMARY CARE CLINIC | Age: 45
End: 2025-03-06

## 2025-03-06 VITALS
HEIGHT: 68 IN | HEART RATE: 58 BPM | OXYGEN SATURATION: 98 % | SYSTOLIC BLOOD PRESSURE: 126 MMHG | BODY MASS INDEX: 37.89 KG/M2 | TEMPERATURE: 97.8 F | WEIGHT: 250 LBS | DIASTOLIC BLOOD PRESSURE: 74 MMHG

## 2025-03-06 DIAGNOSIS — E78.2 MIXED HYPERLIPIDEMIA: ICD-10-CM

## 2025-03-06 DIAGNOSIS — Q90.9 DOWN'S SYNDROME: ICD-10-CM

## 2025-03-06 DIAGNOSIS — Z09 HOSPITAL DISCHARGE FOLLOW-UP: Primary | ICD-10-CM

## 2025-03-06 SDOH — ECONOMIC STABILITY: FOOD INSECURITY: WITHIN THE PAST 12 MONTHS, YOU WORRIED THAT YOUR FOOD WOULD RUN OUT BEFORE YOU GOT MONEY TO BUY MORE.: NEVER TRUE

## 2025-03-06 SDOH — ECONOMIC STABILITY: FOOD INSECURITY: WITHIN THE PAST 12 MONTHS, THE FOOD YOU BOUGHT JUST DIDN'T LAST AND YOU DIDN'T HAVE MONEY TO GET MORE.: NEVER TRUE

## 2025-03-06 NOTE — PROGRESS NOTES
atraumatic  Eyes: pupils equal, round, and reactive to light, extraocular eye movements intact, conjunctivae normal  ENT: tympanic membrane, external ear and ear canal normal bilaterally, nose without deformity, nasal mucosa and turbinates normal without polyps  Neck: supple and non-tender without mass, no thyromegaly or thyroid nodules, no cervical lymphadenopathy  Pulmonary/Chest: clear to auscultation bilaterally- no wheezes, rales or rhonchi, normal air movement, no respiratory distress  Cardiovascular: normal rate, regular rhythm, normal S1 and S2, no murmurs, rubs, clicks, or gallops, distal pulses intact, no carotid bruits  Abdomen: soft, non-tender, non-distended, normal bowel sounds, no masses or organomegaly  Extremities: no cyanosis, clubbing or edema  Musculoskeletal: normal range of motion, no joint swelling, deformity or tenderness  Neurologic: reflexes normal and symmetric, no cranial nerve deficit, gait, coordination and speech normal      An electronic signature was used to authenticate this note.  --Nel Chiu, APRN

## 2025-05-30 ENCOUNTER — TELEPHONE (OUTPATIENT)
Dept: PRIMARY CARE CLINIC | Age: 45
End: 2025-05-30

## 2025-05-30 NOTE — TELEPHONE ENCOUNTER
Father hao stopped in with a referral for ent, states he went to [facility] and they said they needed office notes and referral faxed over to them prior to scheduling. He states [facility] checked with [facility] and they said they dont have an audiologist and he would need a hearing test prior to seeing them. Asking to be advised what to do?    Please call and advise

## 2025-06-12 ENCOUNTER — PROCEDURE VISIT (OUTPATIENT)
Dept: OTOLARYNGOLOGY | Facility: CLINIC | Age: 45
End: 2025-06-12
Payer: MEDICAID

## 2025-06-12 ENCOUNTER — OFFICE VISIT (OUTPATIENT)
Dept: OTOLARYNGOLOGY | Facility: CLINIC | Age: 45
End: 2025-06-12
Payer: MEDICAID

## 2025-06-12 VITALS
TEMPERATURE: 97 F | HEIGHT: 66 IN | SYSTOLIC BLOOD PRESSURE: 107 MMHG | WEIGHT: 244 LBS | HEART RATE: 83 BPM | DIASTOLIC BLOOD PRESSURE: 77 MMHG | BODY MASS INDEX: 39.21 KG/M2

## 2025-06-12 DIAGNOSIS — J34.3 NASAL TURBINATE HYPERTROPHY: ICD-10-CM

## 2025-06-12 DIAGNOSIS — H61.22 IMPACTED CERUMEN OF LEFT EAR: Primary | ICD-10-CM

## 2025-06-12 NOTE — PROGRESS NOTES
CAHNDLER Girard  [facility]  [address], [address]  [place] [address]  Fax [phone]  Phone [phone]      Visit Type: NEW PATIENT   Chief Complaint   Patient presents with    Hearing Loss     Left ear,full of wax,tugs at his ear    New Patient           HISTORY OBTAINED FROM: patient's father  HPI  He complains of cerumen accumulation. The symptoms are localized to the left side. The patient has had moderate symptoms. The symptoms have been present for the last several months. The symptoms are aggravated by  small ear canal with downs syndrome.     Past Medical History:   Diagnosis Date    Down syndrome     Gout        History reviewed. No pertinent surgical history.    Family History: His family history includes Diabetes in his father; Hyperlipidemia in his father; Hypertension in his father; No Known Problems in his mother.     Social History: He  reports that he has never smoked. He has never used smokeless tobacco. He reports that he does not drink alcohol and does not use drugs.    Home Medications:  Vitamin D3, Zinc, albuterol sulfate HFA, allopurinol, ammonium lactate, ascorbic acid, budesonide-formoterol, ciprofloxacin-dexAMETHasone, colchicine, dextromethorphan polistirex ER, famotidine, guaiFENesin, indomethacin, simvastatin, and vitamin D    Allergies:  He has no known allergies.       Vital Signs:   Temp:  [97 °F (36.1 °C)] 97 °F (36.1 °C)  Heart Rate:  [83] 83  BP: (107)/(77) 107/77  ENT Physical Exam  Constitutional  Appearance: patient appears well-developed,  Head and Face  Appearance: face appears normal and face appears atraumatic;  Palpation: facial palpation normal;  Salivary: glands normal;  Ear  Hearing: intact;  Auricles: bilateral auricles normal;  Ear Canals: right ear canal normal; left ear canal impacted cerumen observed;  Tympanic Membranes: bilateral tympanic membranes normal;  Nose  External  Nose: nares patent bilaterally; external nose normal;  Internal Nose: nasal mucosa normal; septum normal; bilateral inferior turbinates with hypertrophy;  Oral Cavity/Oropharynx  Lips: normal;  Teeth: normal;  Gums: gingiva normal;  Tongue: normal;  Oral mucosa: normal;  Hard palate: normal;  Soft palate: normal;  Tonsils: normal;  Base of Tongue: prominent  Neck  Neck: neck normal; neck palpation normal;  Respiratory  Inspection: breathing unlabored;  Cardiovascular  Inspection: extremities are warm and well perfused;  Lymphatic  Palpation: lymph nodes normal;  Neurovestibular  Mental Status: alert and oriented;  Psychiatric: mood normal; affect is appropriate;       Ear Microscopy with Left Cerumen Removal    Date/Time: 6/12/2025 3:28 PM    Performed by: Leighann Warner APRN  Authorized by: Leighann Warner APRN    Ear examination was performed utilizing binocular microscopy.  Right auricle:   normal:   Right ear canal:   normal:   Right tympanic membrane:   normal:   Left auricle:   normal:   Left ear canal:   impacted cerumen.   Left tympanic membrane:   normal:     Procedure:    Cerumen was removed from the left ear with a forceps.   Post-procedure details:     Inspection after the procedure revealed an intact TM.    The procedure was tolerated well, no immediate complications.     Result Review       RESULTS REVIEW    I have reviewed the patients old records in the chart.         Assessment & Plan  Impacted cerumen of left ear    Nasal turbinate hypertrophy         Conservative management. Dad to call of needs ear cleaning. Will use mineral oil prn for wax.  No follow-ups on file.        Electronically signed by CHANDLER Girard 06/12/25 3:28 PM CDT.

## 2025-06-12 NOTE — PROGRESS NOTES
AUDIOMETRIC EVALUATION      Name:  Kevin Linder  :  1980  Age:  44 y.o.  Date of Evaluation:  2025       History:  Mr. Linder is seen today for a hearing evaluation due to hearing loss and aural fullness of the left ear at the request of CHANDLER Castro.    Audiologic Information:  Concerns for Hearing: Left   PETs: Bilateral history in childhood only   Other otologic surgical history: no  Aural Pressure/Fullness: Left   Otalgia: Left   Otorrhea: no  Tinnitus: Unsure   Dizziness: no  Noise Exposure: no  Family history of hearing loss: no  Head trauma requiring hospital stay: no  Chemotherapy: no  Other significant history: Mostly non-verbal     **Case history obtained in office and through EMR system      EVALUATION:    Testing deferred due to cerumen impaction of the left ear.     RESULTS:    Otoscopic Evaluation:  Right: partially occluding cerumen, tympanic membrane visualized  Left: mostly occluding cerumen, tympanic membrane not visualized      Diagnosis:  1. Impacted cerumen of left ear         RECOMMENDATIONS/PLAN:  Follow-up recommendations per CHANDLER Castro.  Repeat hearing evaluation after medical intervention.  Discussed results and recommendations with patient. Questions were addressed and the patient was encouraged to contact our department should concerns arise.        Umu Voss, CCC-A  Doctor of Audiology

## 2025-06-17 ENCOUNTER — OFFICE VISIT (OUTPATIENT)
Dept: PRIMARY CARE CLINIC | Age: 45
End: 2025-06-17
Payer: MEDICAID

## 2025-06-17 VITALS
TEMPERATURE: 97.3 F | WEIGHT: 249.2 LBS | DIASTOLIC BLOOD PRESSURE: 78 MMHG | HEIGHT: 68 IN | HEART RATE: 69 BPM | OXYGEN SATURATION: 100 % | BODY MASS INDEX: 37.77 KG/M2 | SYSTOLIC BLOOD PRESSURE: 124 MMHG

## 2025-06-17 DIAGNOSIS — Z00.00 ENCOUNTER FOR ANNUAL PHYSICAL EXAM: Primary | ICD-10-CM

## 2025-06-17 DIAGNOSIS — E78.2 MIXED HYPERLIPIDEMIA: ICD-10-CM

## 2025-06-17 DIAGNOSIS — Q90.9 DOWN'S SYNDROME: Chronic | ICD-10-CM

## 2025-06-17 DIAGNOSIS — M10.9 GOUT OF BIG TOE: ICD-10-CM

## 2025-06-17 DIAGNOSIS — E55.9 VITAMIN D DEFICIENCY: ICD-10-CM

## 2025-06-17 PROCEDURE — 99214 OFFICE O/P EST MOD 30 MIN: CPT | Performed by: NURSE PRACTITIONER

## 2025-06-17 RX ORDER — ALBUTEROL SULFATE 90 UG/1
2 INHALANT RESPIRATORY (INHALATION) 4 TIMES DAILY PRN
Qty: 18 G | Refills: 1 | Status: SHIPPED | OUTPATIENT
Start: 2025-06-17

## 2025-06-17 RX ORDER — BUDESONIDE AND FORMOTEROL FUMARATE DIHYDRATE 160; 4.5 UG/1; UG/1
2 AEROSOL RESPIRATORY (INHALATION) 2 TIMES DAILY
Qty: 10.2 G | Refills: 2 | Status: SHIPPED | OUTPATIENT
Start: 2025-06-17

## 2025-06-17 RX ORDER — ALLOPURINOL 300 MG/1
300 TABLET ORAL DAILY
Qty: 90 TABLET | Refills: 1 | Status: SHIPPED | OUTPATIENT
Start: 2025-06-17

## 2025-06-17 RX ORDER — ERGOCALCIFEROL 1.25 MG/1
50000 CAPSULE, LIQUID FILLED ORAL WEEKLY
Qty: 12 CAPSULE | Refills: 1 | Status: SHIPPED | OUTPATIENT
Start: 2025-06-17

## 2025-06-17 ASSESSMENT — ENCOUNTER SYMPTOMS
VOMITING: 0
NAUSEA: 0
RHINORRHEA: 0
PHOTOPHOBIA: 0
COLOR CHANGE: 0
BACK PAIN: 0
COUGH: 0
VOICE CHANGE: 0
SHORTNESS OF BREATH: 0

## 2025-06-17 NOTE — PROGRESS NOTES
[facility]  [address]  [address]  [place] [address]  Dept: [phone]  Dept Fax: [phone]  Loc: [phone]    Jimmy Pyle is a 44 y.o. male who presents today for his medical conditions/complaints as noted below.  Jimmy Pyle is c/o of Follow-up (6 month fu. Went and had hearing test done. Wants you to check refills )      HPI:     History of Present Illness  The patient presents for evaluation of ear pain, gout, cough, and cholesterol management.    They recently experienced ear discomfort, which was addressed by Raine Terry, a nurse practitioner at [facility]. Their ears were cleaned, but no hearing test was conducted. They were advised to use mineral oil drops twice weekly to maintain ear cleanliness.    They are currently not taking simvastatin for cholesterol management. The last cholesterol check was in December 2024, and the results were good.    Their sleep pattern is regular, with an early bedtime and wake-up time of 0630 hours. They attend [facility] five days a week. They report no issues with bowel or bladder function. They have not had a dental examination recently.       Past Medical History:   Diagnosis Date    Back stiffness     Down's syndrome     Gout     Nonverbal     Osteoarthritis     Stiffness in joint     bilateral legs      Past Surgical History:   Procedure Laterality Date    CARDIAC SURGERY N/A     septal repair, as a baby           6/17/2025    10:22 AM 3/6/2025     3:10 PM 2/28/2025     8:07 AM 2/28/2025     7:02 AM 2/28/2025     4:42 AM 2/28/2025     3:45 AM   Vitals   SYSTOLIC 124 126 120   110   DIASTOLIC 78 74 72   81   Pulse 69 58 83 76  82   Temp 97.3 °F (36.3 °C) 97.8 °F (36.6 °C) 97.7 °F (36.5 °C)   97.2 °F (36.2 °C)   Respirations   14 18  16   SpO2 100 % 98 % 100 % 97 %  100 %   Weight - Scale 249 lb 3.2 oz 250 lb   251 lb 5.2 oz    Height 5' 8\" 5' 7.992\"       Body Mass Index 37.89 kg/m2 38.02 kg/m2   38.21 kg/m2